# Patient Record
Sex: FEMALE | Race: WHITE | Employment: FULL TIME | URBAN - METROPOLITAN AREA
[De-identification: names, ages, dates, MRNs, and addresses within clinical notes are randomized per-mention and may not be internally consistent; named-entity substitution may affect disease eponyms.]

---

## 2018-08-29 ENCOUNTER — HOSPITAL ENCOUNTER (EMERGENCY)
Age: 28
Discharge: HOME | End: 2018-08-29
Attending: EMERGENCY MEDICINE
Payer: SUBSIDIZED

## 2018-08-29 ENCOUNTER — APPOINTMENT (OUTPATIENT)
Dept: GENERAL RADIOLOGY | Age: 28
End: 2018-08-29
Attending: EMERGENCY MEDICINE
Payer: SUBSIDIZED

## 2018-08-29 VITALS
TEMPERATURE: 97.9 F | WEIGHT: 293 LBS | HEIGHT: 67 IN | SYSTOLIC BLOOD PRESSURE: 142 MMHG | OXYGEN SATURATION: 100 % | DIASTOLIC BLOOD PRESSURE: 69 MMHG | HEART RATE: 80 BPM | RESPIRATION RATE: 18 BRPM | BODY MASS INDEX: 45.99 KG/M2

## 2018-08-29 DIAGNOSIS — M25.572 ACUTE LEFT ANKLE PAIN: Primary | ICD-10-CM

## 2018-08-29 PROCEDURE — 93971 EXTREMITY STUDY: CPT

## 2018-08-29 PROCEDURE — 73610 X-RAY EXAM OF ANKLE: CPT

## 2018-08-29 PROCEDURE — 99282 EMERGENCY DEPT VISIT SF MDM: CPT

## 2018-08-29 RX ORDER — IBUPROFEN 600 MG/1
600 TABLET ORAL
Qty: 20 TAB | Refills: 0 | Status: SHIPPED | OUTPATIENT
Start: 2018-08-29 | End: 2018-08-29

## 2018-08-29 NOTE — ED NOTES
 
 
33year old female with complaints of right foot and ankle pain + numbness, tingling, swelling + OC and smoking.

## 2018-08-29 NOTE — ED NOTES
Patient given discharge instructions and prescriptions, verbalized understanding. Pt crutched out of ER with male  who is driving pt home

## 2018-08-29 NOTE — ED PROVIDER NOTES
Patient is a 33 y.o. female presenting with ankle pain. Ankle Pain Pertinent negatives include no back pain and no neck pain. Pt states that she had an abrupt onset of left ankle pain this morning when she stepped out of bed. She denies any falls or direct injury. Skin integrity is intact with lateral patches of chronic psoriasis. . There is no obvious bony deformity. Good neurovascular sensation. No apparent tendon or nerve injury. Pain increases with weight bearing. She has not had any medications today prior to arrival. 
Old charts reviewed. Past Medical History:  
Diagnosis Date  Asthma  Atrial fibrillation ([address]  Enlarged heart  Heart murmur  Obese  Psychiatric disorder   
 anxiety Past Surgical History:  
Procedure Laterality Date  CARDIAC SURG PROCEDURE UNLIST    
 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    
 bilat. feet  VASCULAR SURGERY PROCEDURE UNLIST Family History:  
Problem Relation Age of Onset  Mental Retardation Mother  Diabetes Maternal Grandmother  Heart Disease Maternal Grandmother  Hypertension Maternal Grandmother  Stroke Maternal Grandmother Social History Social History  Marital status:  Spouse name: N/A  
 Number of children: N/A  
 Years of education: N/A Occupational History  Not on file. Social History Main Topics  Smoking status: Current Every Day Smoker  Smokeless tobacco: Never Used  Alcohol use No  
 Drug use: No  
 Sexual activity: Yes  
  Partners: Male Birth control/ protection: Implant Other Topics Concern  Not on file Social History Narrative ALLERGIES: Ibuprofen Review of Systems Constitutional: Negative for activity change and appetite change. HENT: Negative for facial swelling, sore throat and trouble swallowing. Eyes: Negative. Respiratory: Negative for shortness of breath. Cardiovascular: Negative. Gastrointestinal: Negative for abdominal pain, diarrhea and vomiting. Genitourinary: Negative for dysuria. Musculoskeletal: Positive for gait problem and joint swelling. Negative for back pain and neck pain. Skin: Negative for color change. Neurological: Negative for headaches. Psychiatric/Behavioral: Negative. Vitals:  
 08/29/18 1442 BP: 148/90 Pulse: 90 Resp: 20 Temp: 98.1 °F (36.7 °C) SpO2: 92% Weight: (!) 181.4 kg (400 lb) Height: 5' 7\" (1.702 m) Physical Exam  
Constitutional: She is oriented to person, place, and time. Morbidly obese white female; smoker HENT:  
Head: Normocephalic. Mouth/Throat: Oropharynx is clear and moist.  
Eyes: Pupils are equal, round, and reactive to light. Neck: Normal range of motion. Neck supple. Cardiovascular: Normal rate and regular rhythm. Pulmonary/Chest: Effort normal and breath sounds normal.  
Abdominal: Soft. Bowel sounds are normal.  
Musculoskeletal: Normal range of motion. She exhibits edema and tenderness. She exhibits no deformity. Neurological: She is alert and oriented to person, place, and time. Skin: Skin is warm and dry. Rash noted. Chronic psoriasis rash on both ankles Nursing note and vitals reviewed. MDM 
 
 
ED Course Procedures Pt was instructed in the use of crutches to avoid full weightbearing. Encouraged close follow up with foot specialist if symptoms persist. 
6:30 PM 
Patient's results and plan of care have been reviewed with her. Patient and/or family have verbally conveyed their understanding and agreement of the patient's signs, symptoms, diagnosis, treatment and prognosis and additionally agree to follow up as recommended or return to the Emergency Room should her condition change prior to follow-up.   Discharge instructions have also been provided to the patient with some educational information regarding her diagnosis as well a list of reasons why she would want to return to the ER prior to her follow-up appointment should her condition change. Supa Duenas, NP

## 2018-08-29 NOTE — DISCHARGE INSTRUCTIONS
Foot Pain: Care Instructions  Your Care Instructions  Foot injuries that cause pain and swelling are fairly common. Almost all sports or home repair projects can cause a misstep that ends up as foot pain. Normal wear and tear, especially as you get older, also can cause foot pain. Most minor foot injuries will heal on their own, and home treatment is usually all you need to do. If you have a severe injury, you may need tests and treatment. Follow-up care is a key part of your treatment and safety. Be sure to make and go to all appointments, and call your doctor if you are having problems. It's also a good idea to know your test results and keep a list of the medicines you take. How can you care for yourself at home? · Take pain medicines exactly as directed. ¨ If the doctor gave you a prescription medicine for pain, take it as prescribed. ¨ If you are not taking a prescription pain medicine, ask your doctor if you can take an over-the-counter medicine. · Rest and protect your foot. Take a break from any activity that may cause pain. · Put ice or a cold pack on your foot for 10 to 20 minutes at a time. Put a thin cloth between the ice and your skin. · Prop up the sore foot on a pillow when you ice it or anytime you sit or lie down during the next 3 days. Try to keep it above the level of your heart. This will help reduce swelling. · Your doctor may recommend that you wrap your foot with an elastic bandage. Keep your foot wrapped for as long as your doctor advises. · If your doctor recommends crutches, use them as directed. · Wear roomy footwear. · As soon as pain and swelling end, begin gentle exercises of your foot. Your doctor can tell you which exercises will help. When should you call for help? Call 911 anytime you think you may need emergency care.  For example, call if:    · Your foot turns pale, white, blue, or cold.    Call your doctor now or seek immediate medical care if:    · You cannot move or stand on your foot.     · Your foot looks twisted or out of its normal position.     · Your foot is not stable when you step down.     · You have signs of infection, such as:  ¨ Increased pain, swelling, warmth, or redness. ¨ Red streaks leading from the sore area. ¨ Pus draining from a place on your foot. ¨ A fever.     · Your foot is numb or tingly.    Watch closely for changes in your health, and be sure to contact your doctor if:    · You do not get better as expected.     · You have bruises from an injury that last longer than 2 weeks. Where can you learn more? Go to http://will-rafy.info/. Enter X337 in the search box to learn more about \"Foot Pain: Care Instructions. \"  Current as of: November 29, 2017  Content Version: 11.7  © 0905-6104 [employer]. Care instructions adapted under license by [employer] (which disclaims liability or warranty for this information). If you have questions about a medical condition or this instruction, always ask your healthcare professional. [place] [address] any warranty or liability for your use of this information. Musculoskeletal Pain: Care Instructions  Your Care Instructions    Different problems with the bones, muscles, nerves, ligaments, and tendons in the body can cause pain. One or more areas of your body may ache or burn. Or they may feel tired, stiff, or sore. The medical term for this type of pain is musculoskeletal pain. It can have many different causes. Sometimes the pain is caused by an injury such as a strain or sprain. Or you might have pain from using one part of your body in the same way over and over again. This is called overuse. In some cases, the cause of the pain is another health problem such as arthritis or fibromyalgia. The doctor will examine you and ask you questions about your health to help find the cause of your pain.  Blood tests or imaging tests like an X-ray may also be helpful. But sometimes doctors can't find a cause of the pain. Treatment depends on your symptoms and the cause of the pain, if known. The doctor has checked you carefully, but problems can develop later. If you notice any problems or new symptoms, get medical treatment right away. Follow-up care is a key part of your treatment and safety. Be sure to make and go to all appointments, and call your doctor if you are having problems. It's also a good idea to know your test results and keep a list of the medicines you take. How can you care for yourself at home? · Rest until you feel better. · Do not do anything that makes the pain worse. Return to exercise gradually if you feel better and your doctor says it's okay. · Be safe with medicines. Read and follow all instructions on the label. ¨ If the doctor gave you a prescription medicine for pain, take it as prescribed. ¨ If you are not taking a prescription pain medicine, ask your doctor if you can take an over-the-counter medicine. · Put ice or a cold pack on the area for 10 to 20 minutes at a time to ease pain. Put a thin cloth between the ice and your skin. When should you call for help? Call your doctor now or seek immediate medical care if:    · You have new pain, or your pain gets worse.     · You have new symptoms such as a fever, a rash, or chills.    Watch closely for changes in your health, and be sure to contact your doctor if:    · You do not get better as expected. Where can you learn more? Go to http://will-rafy.info/. Enter Y579 in the search box to learn more about \"Musculoskeletal Pain: Care Instructions. \"  Current as of: October 9, 2017  Content Version: 11.7  © 0098-6363 [email or web address]. Care instructions adapted under license by [employer] (which disclaims liability or warranty for this information).  If you have questions about a medical condition or this instruction, always ask your healthcare professional. [place] [address] any warranty or liability for your use of this information.

## 2018-08-29 NOTE — LETTER
NOTIFICATION RETURN TO WORK / SCHOOL 
 
8/29/2018 6:31 PM 
 
Ms. Anusha Dolan [address] [place] [address] To Whom It May Concern: 
 
Anusha Dolan is currently under the care of [address]. She will return to work/school on: 9/1/18 If there are questions or concerns please have the patient contact our office. Sincerely, Margarita Thomas NP

## 2018-08-29 NOTE — PROCEDURES
[facility]  *** FINAL REPORT ***    Name: Johnson Hernandez  MRN: [id number]  : 1990  HIS Order #: [id number]  [address] [facility] Visit #: [id number]  Date: 29 Aug 2018    TYPE OF TEST: Peripheral Venous Testing    REASON FOR TEST  Limb swelling    Left Leg:-  Deep venous thrombosis:           No  Superficial venous thrombosis:    No  Deep venous insufficiency:        Not examined  Superficial venous insufficiency: Not examined      INTERPRETATION/FINDINGS  PROCEDURE:  Color duplex ultrasound imaging of lower extremity veins. FINDINGS:       Right: The common femoral vein is patent and without evidence of  thrombus. Phasic flow is observed. This extremity was not otherwise  evaluated. Left:   The common femoral, deep femoral, femoral, popliteal,  posterior tibial, peroneal, and great saphenous are patent and without   evidence of thrombus;  each is fully compressible and there is no  narrowing of the flow channel on color Doppler imaging. There is  limited visualization of the peroneal veins. Phasic flow is observed  in the common femoral vein. IMPRESSION:  No evidence of left lower extremity vein thrombosis where   visualized. ADDITIONAL COMMENTS    I have personally reviewed the data relevant to the interpretation of  this  study.     TECHNOLOGIST: Lucina Herrera RVT  Signed: 2018 05:12 PM    PHYSICIAN: Maple Dubin., MD  Signed: 2018 08:10 AM

## 2018-08-29 NOTE — ED TRIAGE NOTES
Pt complains of L foot pain upon getting up off the cough today. Denies any recent injuries or falls. Pain worsens with palpation. Note rash to inner L ankle, pt states the rash is not new and has had it for years. States she feels like the L leg is swollen.

## 2019-07-29 ENCOUNTER — OFFICE VISIT (OUTPATIENT)
Dept: FAMILY MEDICINE CLINIC | Age: 29
End: 2019-07-29

## 2019-07-29 VITALS
WEIGHT: 293 LBS | DIASTOLIC BLOOD PRESSURE: 100 MMHG | RESPIRATION RATE: 24 BRPM | HEART RATE: 92 BPM | BODY MASS INDEX: 45.99 KG/M2 | HEIGHT: 67 IN | SYSTOLIC BLOOD PRESSURE: 142 MMHG | OXYGEN SATURATION: 99 % | TEMPERATURE: 98.2 F

## 2019-07-29 DIAGNOSIS — J45.40 MODERATE PERSISTENT ASTHMA WITHOUT COMPLICATION: Primary | ICD-10-CM

## 2019-07-29 DIAGNOSIS — M25.561 ACUTE PAIN OF BOTH KNEES: ICD-10-CM

## 2019-07-29 DIAGNOSIS — Z13.220 SCREENING, LIPID: ICD-10-CM

## 2019-07-29 DIAGNOSIS — R73.01 IMPAIRED FASTING BLOOD SUGAR: ICD-10-CM

## 2019-07-29 DIAGNOSIS — M25.562 ACUTE PAIN OF BOTH KNEES: ICD-10-CM

## 2019-07-29 DIAGNOSIS — I48.0 INTERMITTENT ATRIAL FIBRILLATION (HCC): ICD-10-CM

## 2019-07-29 DIAGNOSIS — E66.01 OBESITY, MORBID (HCC): ICD-10-CM

## 2019-07-29 DIAGNOSIS — I10 ESSENTIAL HYPERTENSION: ICD-10-CM

## 2019-07-29 RX ORDER — TRAMADOL HYDROCHLORIDE 50 MG/1
50-100 TABLET ORAL
COMMUNITY
Start: 2018-10-09 | End: 2019-07-29

## 2019-07-29 RX ORDER — BUPROPION HYDROCHLORIDE 150 MG/1
150 TABLET, EXTENDED RELEASE ORAL
COMMUNITY
End: 2019-07-29

## 2019-07-29 RX ORDER — ALBUTEROL SULFATE 90 UG/1
1 AEROSOL, METERED RESPIRATORY (INHALATION)
Qty: 1 INHALER | Refills: 6 | Status: SHIPPED | OUTPATIENT
Start: 2019-07-29 | End: 2020-01-28

## 2019-07-29 RX ORDER — ALBUTEROL SULFATE 0.83 MG/ML
SOLUTION RESPIRATORY (INHALATION)
Refills: 1 | COMMUNITY
Start: 2019-07-27 | End: 2019-07-29 | Stop reason: SDUPTHER

## 2019-07-29 RX ORDER — DESOGESTREL AND ETHINYL ESTRADIOL 0.15-0.03
KIT ORAL
Refills: 3 | COMMUNITY
Start: 2019-06-04 | End: 2020-11-13

## 2019-07-29 RX ORDER — CYCLOBENZAPRINE HCL 10 MG
10 TABLET ORAL
COMMUNITY
Start: 2018-10-09 | End: 2019-07-29

## 2019-07-29 RX ORDER — AMLODIPINE BESYLATE 5 MG/1
TABLET ORAL
Refills: 4 | COMMUNITY
Start: 2019-07-02 | End: 2019-07-29 | Stop reason: SDUPTHER

## 2019-07-29 RX ORDER — BUPROPION HYDROCHLORIDE 150 MG/1
TABLET, EXTENDED RELEASE ORAL
Refills: 3 | COMMUNITY
Start: 2019-07-28 | End: 2019-07-29

## 2019-07-29 RX ORDER — ALPRAZOLAM 0.5 MG/1
0.5 TABLET ORAL
COMMUNITY
End: 2019-07-29

## 2019-07-29 RX ORDER — ESCITALOPRAM OXALATE 10 MG/1
TABLET ORAL
Refills: 1 | COMMUNITY
Start: 2019-07-14 | End: 2019-07-29

## 2019-07-29 RX ORDER — DESVENLAFAXINE 100 MG/1
50 TABLET, EXTENDED RELEASE ORAL DAILY
Refills: 10 | COMMUNITY
Start: 2019-07-02 | End: 2020-10-06 | Stop reason: ALTCHOICE

## 2019-07-29 RX ORDER — ALBUTEROL SULFATE 0.83 MG/ML
2.5 SOLUTION RESPIRATORY (INHALATION)
Qty: 30 NEBULE | Refills: 1 | Status: SHIPPED | OUTPATIENT
Start: 2019-07-29 | End: 2019-10-17 | Stop reason: SDUPTHER

## 2019-07-29 RX ORDER — AMOXICILLIN 500 MG/1
CAPSULE ORAL
Refills: 99 | COMMUNITY
Start: 2019-07-27 | End: 2019-07-29 | Stop reason: ALTCHOICE

## 2019-07-29 RX ORDER — METFORMIN HYDROCHLORIDE 500 MG/1
TABLET, EXTENDED RELEASE ORAL
Refills: 1 | COMMUNITY
Start: 2019-07-13 | End: 2019-10-15 | Stop reason: SDUPTHER

## 2019-07-29 RX ORDER — ALPRAZOLAM 0.5 MG/1
TABLET ORAL
Refills: 1 | COMMUNITY
Start: 2019-04-30 | End: 2019-07-29

## 2019-07-29 RX ORDER — AMLODIPINE BESYLATE 5 MG/1
5 TABLET ORAL DAILY
Qty: 90 TAB | Refills: 0 | Status: SHIPPED | OUTPATIENT
Start: 2019-07-29 | End: 2019-08-23 | Stop reason: DRUGHIGH

## 2019-07-29 RX ORDER — CHLORTHALIDONE 25 MG/1
25 TABLET ORAL DAILY
Qty: 30 TAB | Refills: 0 | Status: SHIPPED | OUTPATIENT
Start: 2019-07-29 | End: 2019-08-23 | Stop reason: SDUPTHER

## 2019-07-29 RX ORDER — OMEPRAZOLE 20 MG/1
20 TABLET, DELAYED RELEASE ORAL
COMMUNITY
End: 2020-03-10

## 2019-07-29 NOTE — PATIENT INSTRUCTIONS
Learning About Bariatric Surgery  What is bariatric surgery? Bariatric surgery is surgery to help you lose weight. This type of surgery is only used for people who are very overweight and have not been able to lose weight with diet and exercise. This surgery makes the stomach smaller. Some types of surgery also change the connection between your stomach and intestines. How is bariatric surgery done? Bariatric surgery may be either \"open\" or \"laparoscopic. \" Open surgery is done through a large cut (incision) in the belly. Laparoscopic surgery is done through several small cuts. The doctor puts a lighted tube, or scope, and other surgical tools through small cuts in your belly. The doctor is able to see your organs with the scope. There are different types of bariatric surgery. Gastric sleeve surgery  The surgery is usually done through several small incisions in the belly. The doctor removes more than half of your stomach. This leaves a thin sleeve, or tube, that is about the size of a banana. Because part of your stomach has been removed, this can't be reversed. Gianna-en-Y gastric bypass surgery  Gianna-en-Y (say \"rene-en-why\") surgery changes the connection between the stomach and the intestines. The doctor separates a section of your stomach from the rest of your stomach. This makes a small pouch. The new pouch will hold the food you eat. The doctor connects the stomach pouch to the middle part of the small intestine. Gastric banding surgery  The surgery is usually done through several small incisions in the belly. The doctor wraps a band around the upper part of the stomach. This creates a small pouch. The small size of the pouch means that you will get full after you eat just a small amount of food. The doctor can inflate or deflate the band to adjust the size. This lets the doctor adjust how quickly food passes from the new pouch into the stomach.  It does not change the connection between the stomach and the intestines. What can you expect after the surgery? You may stay in the hospital for one or more days after the surgery. How long you stay depends on the type of surgery you had. Most people need 2 to 4 weeks before they are ready to get back to their usual routine. For the first 2 to 6 weeks after surgery, you probably will need to follow a liquid or soft diet. Bit by bit, you will be able to eat more solid foods. Your doctor may advise you to work with a dietitian. This way you'll be sure to get enough protein, vitamins, and minerals while you are losing weight. Even with a healthy diet, you may need to take vitamin and mineral supplements. After surgery, you will not be able to eat very much at one time. You will get full quickly. Try not to eat too much at one time or eat foods that are high in fat or sugar. If you do, you may vomit, get stomach pain, or have diarrhea. You probably will lose weight very quickly in the first few months after surgery. As time goes on, your weight loss will slow down. You will have regular doctor visits to check how you are doing. Think of bariatric surgery as a tool to help you lose weight. It isn't an instant fix. You will still need to eat a healthy diet and get regular exercise. This will help you reach your weight goal and avoid regaining the weight you lose. Follow-up care is a key part of your treatment and safety. Be sure to make and go to all appointments, and call your doctor if you are having problems. It's also a good idea to know your test results and keep a list of the medicines you take. Where can you learn more? Go to http://will-rafy.info/. Enter G469 in the search box to learn more about \"Learning About Bariatric Surgery. \"  Current as of: March 28, 2019  Content Version: 12.1  © 0282-1541 [employer].  Care instructions adapted under license by [employer] (which disclaims liability or warranty for this information). If you have questions about a medical condition or this instruction, always ask your healthcare professional. [facility] [address] any warranty or liability for your use of this information. DASH Diet: Care Instructions  Your Care Instructions    The DASH diet is an eating plan that can help lower your blood pressure. DASH stands for Dietary Approaches to Stop Hypertension. Hypertension is high blood pressure. The DASH diet focuses on eating foods that are high in calcium, potassium, and magnesium. These nutrients can lower blood pressure. The foods that are highest in these nutrients are fruits, vegetables, low-fat dairy products, nuts, seeds, and legumes. But taking calcium, potassium, and magnesium supplements instead of eating foods that are high in those nutrients does not have the same effect. The DASH diet also includes whole grains, fish, and poultry. The DASH diet is one of several lifestyle changes your doctor may recommend to lower your high blood pressure. Your doctor may also want you to decrease the amount of sodium in your diet. Lowering sodium while following the DASH diet can lower blood pressure even further than just the DASH diet alone. Follow-up care is a key part of your treatment and safety. Be sure to make and go to all appointments, and call your doctor if you are having problems. It's also a good idea to know your test results and keep a list of the medicines you take. How can you care for yourself at home? Following the DASH diet  · Eat 4 to 5 servings of fruit each day. A serving is 1 medium-sized piece of fruit, ½ cup chopped or canned fruit, 1/4 cup dried fruit, or 4 ounces (½ cup) of fruit juice. Choose fruit more often than fruit juice. · Eat 4 to 5 servings of vegetables each day. A serving is 1 cup of lettuce or raw leafy vegetables, ½ cup of chopped or cooked vegetables, or 4 ounces (½ cup) of vegetable juice.  Choose vegetables more often than vegetable juice. · Get 2 to 3 servings of low-fat and fat-free dairy each day. A serving is 8 ounces of milk, 1 cup of yogurt, or 1 ½ ounces of cheese. · Eat 6 to 8 servings of grains each day. A serving is 1 slice of bread, 1 ounce of dry cereal, or ½ cup of cooked rice, pasta, or cooked cereal. Try to choose whole-grain products as much as possible. · Limit lean meat, poultry, and fish to 2 servings each day. A serving is 3 ounces, about the size of a deck of cards. · Eat 4 to 5 servings of nuts, seeds, and legumes (cooked dried beans, lentils, and split peas) each week. A serving is 1/3 cup of nuts, 2 tablespoons of seeds, or ½ cup of cooked beans or peas. · Limit fats and oils to 2 to 3 servings each day. A serving is 1 teaspoon of vegetable oil or 2 tablespoons of salad dressing. · Limit sweets and added sugars to 5 servings or less a week. A serving is 1 tablespoon jelly or jam, ½ cup sorbet, or 1 cup of lemonade. · Eat less than 2,300 milligrams (mg) of sodium a day. If you limit your sodium to 1,500 mg a day, you can lower your blood pressure even more. Tips for success  · Start small. Do not try to make dramatic changes to your diet all at once. You might feel that you are missing out on your favorite foods and then be more likely to not follow the plan. Make small changes, and stick with them. Once those changes become habit, add a few more changes. · Try some of the following:  ? Make it a goal to eat a fruit or vegetable at every meal and at snacks. This will make it easy to get the recommended amount of fruits and vegetables each day. ? Try yogurt topped with fruit and nuts for a snack or healthy dessert. ? Add lettuce, tomato, cucumber, and onion to sandwiches. ? Combine a ready-made pizza crust with low-fat mozzarella cheese and lots of vegetable toppings. Try using tomatoes, squash, spinach, broccoli, carrots, cauliflower, and onions. ?  Have a variety of cut-up vegetables with a low-fat dip as an appetizer instead of chips and dip. ? Sprinkle sunflower seeds or chopped almonds over salads. Or try adding chopped walnuts or almonds to cooked vegetables. ? Try some vegetarian meals using beans and peas. Add garbanzo or kidney beans to salads. Make burritos and tacos with mashed gonzalez beans or black beans. Where can you learn more? Go to http://will-rafy.info/. Enter U927 in the search box to learn more about \"DASH Diet: Care Instructions. \"  Current as of: July 22, 2018  Content Version: 12.1  © 8924-7885 [employer]. Care instructions adapted under license by [employer] (which disclaims liability or warranty for this information). If you have questions about a medical condition or this instruction, always ask your healthcare professional. [address] any warranty or liability for your use of this information. Learning About Low-Carbohydrate Diets for Weight Loss  What is a low-carbohydrate diet? Low-carb diets avoid foods that are high in carbohydrate. These high-carb foods include pasta, bread, rice, cereal, fruits, and starchy vegetables. Instead, these diets usually have you eat foods that are high in fat and protein. Many people lose weight quickly on a low-carb diet. But the early weight loss is water. People on this diet often gain the weight back after they start eating carbs again. Not all diet plans are safe or work well. A lot of the evidence shows that low-carb diets aren't healthy. That's because these diets often don't include healthy foods like fruits and vegetables. Losing weight safely means balancing protein, fat, and carbs with every meal and snack. And low-carb diets don't always provide the vitamins, minerals, and fiber you need. If you have a serious medical condition, talk to your doctor before you try any diet.  These conditions include kidney disease, heart disease, type 2 diabetes, high cholesterol, and high blood pressure. If you are pregnant, it may not be safe for your baby if you are on a low-carb diet. How can you lose weight safely? You might have heard that a diet plan helped another person lose weight. But that doesn't mean that it will work for you. It is very hard to stay on a diet that includes lots of big changes in your eating habits. If you want to get to a healthy weight and stay there, making healthy lifestyle changes will often work better than dieting. These steps can help. · Make a plan for change. Work with your doctor to create a plan that is right for you. · See a dietitian. He or she can show you how to make healthy changes in your eating habits. · Manage stress. If you have a lot of stress in your life, it can be hard to focus on making healthy changes to your daily habits. · Track your food and activity. You are likely to do better at losing weight if you keep track of what you eat and what you do. Follow-up care is a key part of your treatment and safety. Be sure to make and go to all appointments, and call your doctor if you are having problems. It's also a good idea to know your test results and keep a list of the medicines you take. Where can you learn more? Go to http://will-rafy.info/. Enter A121 in the search box to learn more about \"Learning About Low-Carbohydrate Diets for Weight Loss. \"  Current as of: November 7, 2018  Content Version: 12.1  © 2917-8504 Healthwise, Incorporated. Care instructions adapted under license by [employer] (which disclaims liability or warranty for this information). If you have questions about a medical condition or this instruction, always ask your healthcare professional. [address] any warranty or liability for your use of this information. Knee: Exercises  Introduction  Here are some examples of exercises for you to try.  The exercises may be suggested for a condition or for rehabilitation. Start each exercise slowly. Ease off the exercises if you start to have pain. You will be told when to start these exercises and which ones will work best for you. How to do the exercises  Quad sets    1. Sit with your leg straight and supported on the floor or a firm bed. (If you feel discomfort in the front or back of your knee, place a small towel roll under your knee.)  2. Tighten the muscles on top of your thigh by pressing the back of your knee flat down to the floor. (If you feel discomfort under your kneecap, place a small towel roll under your knee.)  3. Hold for about 6 seconds, then rest for up to 10 seconds. 4. Do 8 to 12 repetitions several times a day. Straight-leg raises to the front    1. Lie on your back with your good knee bent so that your foot rests flat on the floor. Your injured leg should be straight. Make sure that your low back has a normal curve. You should be able to slip your flat hand in between the floor and the small of your back, with your palm touching the floor and your back touching the back of your hand. 2. Tighten the thigh muscles in the injured leg by pressing the back of your knee flat down to the floor. Hold your knee straight. 3. Keeping the thigh muscles tight, lift your injured leg up so that your heel is about 12 inches off the floor. Hold for about 6 seconds and then lower slowly. 4. Do 8 to 12 repetitions, 3 times a day. Straight-leg raises to the outside    1. Lie on your side, with your injured leg on top. 2. Tighten the front thigh muscles of your injured leg to keep your knee straight. 3. Keep your hip and your leg straight in line with the rest of your body, and keep your knee pointing forward. Do not drop your hip back. 4. Lift your injured leg straight up toward the ceiling, about 12 inches off the floor. Hold for about 6 seconds, then slowly lower your leg. 5. Do 8 to 12 repetitions.     Straight-leg raises to the back    1. Lie on your stomach, and lift your leg straight up behind you (toward the ceiling). 2. Lift your toes about 6 inches off the floor, hold for about 6 seconds, then lower slowly. 3. Do 8 to 12 repetitions. Straight-leg raises to the inside    1. Lie on the side of your body with the injured leg. 2. You can either prop your other (good) leg up on a chair, or you can bend your good knee and put that foot in front of your injured knee. Do not drop your hip back. 3. Tighten the muscles on the front of your thigh to straighten your injured knee. 4. Keep your kneecap pointing forward, and lift your whole leg up toward the ceiling about 6 inches. Hold for about 6 seconds, then lower slowly. 5. Do 8 to 12 repetitions. Heel dig bridging    1. Lie on your back with both knees bent and your ankles bent so that only your heels are digging into the floor. Your knees should be bent about 90 degrees. 2. Then push your heels into the floor, squeeze your buttocks, and lift your hips off the floor until your shoulders, hips, and knees are all in a straight line. 3. Hold for about 6 seconds as you continue to breathe normally, and then slowly lower your hips back down to the floor and rest for up to 10 seconds. 4. Do 8 to 12 repetitions. Hamstring curls    1. Lie on your stomach with your knees straight. If your kneecap is uncomfortable, roll up a washcloth and put it under your leg just above your kneecap. 2. Lift the foot of your injured leg by bending the knee so that you bring the foot up toward your buttock. If this motion hurts, try it without bending your knee quite as far. This may help you avoid any painful motion. 3. Slowly lower your leg back to the floor. 4. Do 8 to 12 repetitions. 5. With permission from your doctor or physical therapist, you may also want to add a cuff weight to your ankle (not more than 5 pounds).  With weight, you do not have to lift your leg more than 12 inches to get a hamstring workout. Shallow standing knee bends    1. Stand with your hands lightly resting on a counter or chair in front of you. Put your feet shoulder-width apart. 2. Slowly bend your knees so that you squat down like you are going to sit in a chair. Make sure your knees do not go in front of your toes. 3. Lower yourself about 6 inches. Your heels should remain on the floor at all times. 4. Rise slowly to a standing position. Heel raises    1. Stand with your feet a few inches apart, with your hands lightly resting on a counter or chair in front of you. 2. Slowly raise your heels off the floor while keeping your knees straight. 3. Hold for about 6 seconds, then slowly lower your heels to the floor. 4. Do 8 to 12 repetitions several times during the day. Follow-up care is a key part of your treatment and safety. Be sure to make and go to all appointments, and call your doctor if you are having problems. It's also a good idea to know your test results and keep a list of the medicines you take. Where can you learn more? Go to http://will-rafy.info/. Enter H367 in the search box to learn more about \"Knee: Exercises. \"  Current as of: September 20, 2018  Content Version: 12.1  © 7613-1805 Healthwise, Incorporated. Care instructions adapted under license by [employer] (which disclaims liability or warranty for this information). If you have questions about a medical condition or this instruction, always ask your healthcare professional. [address] any warranty or liability for your use of this information.

## 2019-07-29 NOTE — PROGRESS NOTES
[facility] Note  HPI:   Erwin Conroy is a 30 y.o. female who presents to establish care. Previous provider: Dr. oMntejo July, last appointment in winter 2018. Chief Complaint   Patient presents with    New Patient    Establish Care    Physical     pt is fasting     Cardiovascular Review:  The patient has hypertension, Atrial Fibrillation and obesity. History of aortic valve regurgitation, intermittent a.fib. Cardiologist is Dr. Huang Lopez. Last OV was 1 month ago. Follow-up in 1 year with echo. Diet and Lifestyle: sedentary, nonsmoker  Home BP Monitoring: is borderline controlled at home, ranging 120's/80-90's. Pertinent ROS: taking medications as instructed, no medication side effects noted, no TIA's, no chest pain on exertion, no dyspnea on exertion, no swelling of ankles. History of anxiety and depression. Feels like symptoms are well controlled. Denies depressed mood. On Celexa 20, desvenlafaxine 100 mg daily and has been stable on regimen. No adverse effects. History of admission for attempted self-harm. No mental health provider in a few years. History of asthma. Triggers: pollen, dust, exericise, warm weather. Albuterol inhaler maybe twice weekkl. Not currently on breo or advair. Needs refills. Last pulmonary functoin testing several years ago. Last bronchitis episdoes was in the winter. OBGYN: Dr. Live Bartlett. UTD on Pap smear screenings. History of PCOS, OBGYN has been prescibing metfomin  mg   Also on MEGAN. Reports pneumococcal vaccines at [employer]. Bilerateral knee pain   Worse with walking. Tylenol and advil PM to take edge off  Sometime knee feels like it will giving out     Morbidly obese. She reports today is heaviest lifetime weight. Tried ketogenic diet. Was sufcessful, lost 20 pounds. Also tried weight watchers which was not as helpful. Lowest adult weight was 250lbs as a teenager.      Diet Recall:  Breakfast yesterday: None, tends to sleep in. Lunch: taco dip with rahel chips, unsweet tea  Dinner: Chicken Parmesan, 1 breast, with spaghetti, unsweet tea. Other drinks: water. Maybe 1 can or soft drink soda per day. Current Outpatient Medications   Medication Sig Dispense Refill    omeprazole (PRILOSEC OTC) 20 mg tablet Take 20 mg by mouth.  metFORMIN ER (GLUCOPHAGE XR) 500 mg tablet TAKE 1 TABLET BY MOUTH EVERY DAY IN THE EVENING WITH DINNER  1    Desvenlafaxine 100 mg Tb24 TAKE 1 TABLET BY MOUTH EVERY DAY  10    ENSKYCE 0.15-0.03 mg tab TAKE 1 TABLET BY MOUTH ONCE DAILY  3    albuterol (PROVENTIL HFA, VENTOLIN HFA, PROAIR HFA) 90 mcg/actuation inhaler Take 1 Puff by inhalation every six (6) hours as needed for Wheezing. 1 Inhaler 6    albuterol (PROVENTIL VENTOLIN) 2.5 mg /3 mL (0.083 %) nebu Take 3 mL by inhalation every four (4) hours as needed for Cough or Other (SOB). 30 Nebule 1    amLODIPine (NORVASC) 5 mg tablet Take 1 Tab by mouth daily. 90 Tab 0    chlorthalidone (HYGROTEN) 25 mg tablet Take 1 Tab by mouth daily. 30 Tab 0    citalopram (CELEXA) 20 mg tablet Take 1 Tab by mouth daily. 30 Tab 0      Allergies   Allergen Reactions    Ibuprofen Nausea and Vomiting      Patient Active Problem List   Diagnosis Code    Anxiety F41.9    Depression F32.9    Obesity, morbid ([address] E66.01     Past Medical History:   Diagnosis Date    Anxiety and depression     anxiety    Asthma     Atrial fibrillation ([address]     Enlarged heart     Heart murmur     Hypertension     Obese       Past Surgical History:   Procedure Laterality Date    CARDIAC SURG PROCEDURE UNLIST      2 heart valves \"sewn up\" as an infant    HX ORTHOPAEDIC      bilat. feet    VASCULAR SURGERY PROCEDURE UNLIST        No LMP recorded. (Menstrual status: Medically Induced).    Family History   Problem Relation Age of Onset    Mental Retardation Mother     Diabetes Maternal Grandmother     Heart Disease Maternal Grandmother     Hypertension Maternal Grandmother     Stroke Maternal Grandmother       Social History     Socioeconomic History    Marital status:      Spouse name: Not on file    Number of children: Not on file    Years of education: Not on file    Highest education level: Not on file   Occupational History    Not on file   Social Needs    Financial resource strain: Not on file    Food insecurity:     Worry: Not on file     Inability: Not on file    Transportation needs:     Medical: Not on file     Non-medical: Not on file   Tobacco Use    Smoking status: Former Smoker    Smokeless tobacco: Never Used   Substance and Sexual Activity    Alcohol use: No    Drug use: No    Sexual activity: Yes     Partners: Male     Birth control/protection: Pill   Lifestyle    Physical activity:     Days per week: Not on file     Minutes per session: Not on file    Stress: Not on file   Relationships    Social connections:     Talks on phone: Not on file     Gets together: Not on file     Attends [employer] service: Not on file     Active member of club or organization: Not on file     Attends meetings of clubs or organizations: Not on file     Relationship status: Not on file    Intimate partner violence:     Fear of current or ex partner: Not on file     Emotionally abused: Not on file     Physically abused: Not on file     Forced sexual activity: Not on file   Other Topics Concern    Not on file   Social History Narrative    Not on file        ROS:   Review of Systems   Constitutional: Negative for chills, diaphoresis, fever, malaise/fatigue and weight loss. HENT: Negative for congestion, ear pain, hearing loss, sinus pain, sore throat and tinnitus. Eyes: Negative for blurred vision and double vision. Respiratory: Negative for shortness of breath. Cardiovascular: Negative for chest pain, palpitations and leg swelling.    Gastrointestinal: Negative for abdominal pain, blood in stool, constipation, diarrhea, heartburn, melena, nausea and vomiting. Genitourinary: Negative for dysuria, frequency, hematuria and urgency. Musculoskeletal: Positive for joint pain. Negative for myalgias. Skin: Negative for itching and rash. Neurological: Negative for dizziness, tingling, weakness and headaches. Endo/Heme/Allergies: Negative for polydipsia. Psychiatric/Behavioral: Negative. Negative for depression, hallucinations, memory loss, substance abuse and suicidal ideas. The patient is not nervous/anxious and does not have insomnia. Physical Exam:     Visit Vitals  BP (!) 142/100   Pulse 92   Temp 98.2 °F (36.8 °C) (Oral)   Resp 24   Ht 5' 7\" (1.702 m)   Wt (!) 443 lb (200.9 kg)   SpO2 99%   BMI 69.38 kg/m²        Vitals and Nurse Documentation reviewed. Physical Exam   Constitutional: She is oriented to person, place, and time and well-developed, well-nourished, and in no distress. Morbidly obese female. HENT:   Head: Normocephalic and atraumatic. Neck: Carotid bruit is not present. Cardiovascular: Normal rate, regular rhythm, normal heart sounds and intact distal pulses. Exam reveals no gallop and no friction rub. No murmur heard. Pulmonary/Chest: Effort normal and breath sounds normal. No respiratory distress. She has no wheezes. She has no rales. She exhibits no tenderness. Abdominal: Soft. Bowel sounds are normal. She exhibits no distension and no mass. There is no tenderness. There is no rebound and no guarding. Musculoskeletal: She exhibits edema. Right knee: She exhibits normal range of motion, no swelling, no effusion, no ecchymosis, no deformity, no laceration and no erythema. No tenderness found. Left knee: She exhibits normal range of motion, no swelling, no effusion, no ecchymosis, no deformity, no laceration and no erythema. No tenderness found.    +1 non pitting edema of bilateral ankles    Neurological: She is alert and oriented to person, place, and time. Gait normal.   Skin: Skin is warm and dry. Psychiatric: Mood, memory, affect and judgment normal.   Nursing note and vitals reviewed. Assessment/ Plan:   [employer] were discussed including hours of operation, on-call providers, and MyChart. Diagnoses and all orders for this visit:    1. Moderate persistent asthma without complication: Stable continue PRN albuterol. Consider ICS if albuterol need increases. -     albuterol (PROVENTIL HFA, VENTOLIN HFA, PROAIR HFA) 90 mcg/actuation inhaler; Take 1 Puff by inhalation every six (6) hours as needed for Wheezing.  -     albuterol (PROVENTIL VENTOLIN) 2.5 mg /3 mL (0.083 %) nebu; Take 3 mL by inhalation every four (4) hours as needed for Cough or Other (SOB). 2. Obesity, morbid ([address]): I have reviewed/discussed the above normal BMI with the patient. I have recommended the following interventions: dietary management education, guidance, and counseling, encourage exercise, monitor weight and prescribed dietary intake. Given written instructions as well. -     REFERRAL TO BARIATRIC SURGERY    3. Essential hypertension: Not at goal today, 142/100. Continue Norvasc 5 mg daily, start chlorthalidone 25 mg daily - risks and benefits reviewed. 4 week follow-up. -     amLODIPine (NORVASC) 5 mg tablet; Take 1 Tab by mouth daily. -     chlorthalidone (HYGROTEN) 25 mg tablet; Take 1 Tab by mouth daily.  -     METABOLIC PANEL, COMPREHENSIVE  -     CBC WITH AUTOMATED DIFF    4. Impaired fasting blood sugar  -     HEMOGLOBIN A1C WITH EAG    5. Screening, lipid:   -     LIPID PANEL     6. Acute pain of both knees: Subacute issue. No joint instability noted on exam. She declines PT. Start home exercises. Advised weight loss as above. Consider imaging if symptoms persist.     7. Intermittent atrial fibrillation [facility]): Regular rate and rhythm today. Managed by cardiology.      Other orders  -     CVD REPORT      Follow-up and Dispositions    · Return in about 1 month (around 8/26/2019) for Follow-up.           Discussed expected course/resolution/complications of diagnosis in detail with patient.    Medication risks/benefits/costs/interactions/alternatives discussed with patient.    Pt was given an after visit summary which includes diagnoses, current medications & vitals.  Pt expressed understanding with the diagnosis and plan

## 2019-07-29 NOTE — PROGRESS NOTES
Chief Complaint   Patient presents with    New Patient    Establish Care    Physical     pt is fasting     \"REVIEWED RECORD IN PREPARATION FOR VISIT AND HAVE OBTAINED THE NECESSARY DOCUMENTATION\"  1. Have you been to the ER, urgent care clinic since your last visit? Hospitalized since your last visit? No  Pt is new to this practice today. 2. Have you seen or consulted any other health care providers outside of the [address] [place] [address] since your last visit? Include any pap smears or colon screening.  Yes Where: seen by Cardiology at [facility]

## 2019-07-30 LAB
ALBUMIN SERPL-MCNC: 4.1 G/DL (ref 3.5–5.5)
ALBUMIN/GLOB SERPL: 1.1 {RATIO} (ref 1.2–2.2)
ALP SERPL-CCNC: 87 IU/L (ref 39–117)
ALT SERPL-CCNC: 28 IU/L (ref 0–32)
AST SERPL-CCNC: 26 IU/L (ref 0–40)
BASOPHILS # BLD AUTO: 0 X10E3/UL (ref 0–0.2)
BASOPHILS NFR BLD AUTO: 0 %
BILIRUB SERPL-MCNC: 0.3 MG/DL (ref 0–1.2)
BUN SERPL-MCNC: 13 MG/DL (ref 6–20)
BUN/CREAT SERPL: 19 (ref 9–23)
CALCIUM SERPL-MCNC: 9.3 MG/DL (ref 8.7–10.2)
CHLORIDE SERPL-SCNC: 103 MMOL/L (ref 96–106)
CHOLEST SERPL-MCNC: 167 MG/DL (ref 100–199)
CO2 SERPL-SCNC: 21 MMOL/L (ref 20–29)
CREAT SERPL-MCNC: 0.67 MG/DL (ref 0.57–1)
EOSINOPHIL # BLD AUTO: 0.2 X10E3/UL (ref 0–0.4)
EOSINOPHIL NFR BLD AUTO: 2 %
ERYTHROCYTE [DISTWIDTH] IN BLOOD BY AUTOMATED COUNT: 13.3 % (ref 12.3–15.4)
EST. AVERAGE GLUCOSE BLD GHB EST-MCNC: 123 MG/DL
GLOBULIN SER CALC-MCNC: 3.6 G/DL (ref 1.5–4.5)
GLUCOSE SERPL-MCNC: 70 MG/DL (ref 65–99)
HBA1C MFR BLD: 5.9 % (ref 4.8–5.6)
HCT VFR BLD AUTO: 42.3 % (ref 34–46.6)
HDLC SERPL-MCNC: 71 MG/DL
HGB BLD-MCNC: 13.8 G/DL (ref 11.1–15.9)
IMM GRANULOCYTES # BLD AUTO: 0 X10E3/UL (ref 0–0.1)
IMM GRANULOCYTES NFR BLD AUTO: 0 %
INTERPRETATION, 910389: NORMAL
LDLC SERPL CALC-MCNC: 74 MG/DL (ref 0–99)
LYMPHOCYTES # BLD AUTO: 1.5 X10E3/UL (ref 0.7–3.1)
LYMPHOCYTES NFR BLD AUTO: 14 %
MCH RBC QN AUTO: 30.2 PG (ref 26.6–33)
MCHC RBC AUTO-ENTMCNC: 32.6 G/DL (ref 31.5–35.7)
MCV RBC AUTO: 93 FL (ref 79–97)
MONOCYTES # BLD AUTO: 0.7 X10E3/UL (ref 0.1–0.9)
MONOCYTES NFR BLD AUTO: 7 %
NEUTROPHILS # BLD AUTO: 8.2 X10E3/UL (ref 1.4–7)
NEUTROPHILS NFR BLD AUTO: 77 %
PLATELET # BLD AUTO: 327 X10E3/UL (ref 150–450)
POTASSIUM SERPL-SCNC: 4 MMOL/L (ref 3.5–5.2)
PROT SERPL-MCNC: 7.7 G/DL (ref 6–8.5)
RBC # BLD AUTO: 4.57 X10E6/UL (ref 3.77–5.28)
SODIUM SERPL-SCNC: 140 MMOL/L (ref 134–144)
TRIGL SERPL-MCNC: 108 MG/DL (ref 0–149)
VLDLC SERPL CALC-MCNC: 22 MG/DL (ref 5–40)
WBC # BLD AUTO: 10.7 X10E3/UL (ref 3.4–10.8)

## 2019-08-01 NOTE — PROGRESS NOTES
[employer] message sent:  Cheli Portillo,     Your A1c, average blood sugar over the past 3 months (screening for diabetes), was elevated in the pre-diabetes range. This means that you are at risk for developing diabetes. I recommend that we continue your metformin which will help decrease your insulin resistance. Weight loss is important to help reverse prediabetes. Please look into taking the measures for weight loss we discussed at your visit. The rest of your labs look good and were essentially unremarkable. Please let me know if you have any additional questions. It was great meeting you and I will see you at your next visit!      Armaan Rodriguez NP

## 2019-08-07 DIAGNOSIS — Z01.419 WELL WOMAN EXAM: ICD-10-CM

## 2019-08-13 RX ORDER — CITALOPRAM 20 MG/1
20 TABLET, FILM COATED ORAL DAILY
Qty: 30 TAB | Refills: 5 | Status: SHIPPED | OUTPATIENT
Start: 2019-08-13 | End: 2019-08-23

## 2019-08-14 ENCOUNTER — HOSPITAL ENCOUNTER (EMERGENCY)
Age: 29
Discharge: HOME | End: 2019-08-14
Attending: EMERGENCY MEDICINE
Payer: COMMERCIAL

## 2019-08-14 ENCOUNTER — APPOINTMENT (OUTPATIENT)
Dept: GENERAL RADIOLOGY | Age: 29
End: 2019-08-14
Attending: EMERGENCY MEDICINE
Payer: COMMERCIAL

## 2019-08-14 ENCOUNTER — TELEPHONE (OUTPATIENT)
Dept: FAMILY MEDICINE CLINIC | Age: 29
End: 2019-08-14

## 2019-08-14 VITALS
HEART RATE: 99 BPM | DIASTOLIC BLOOD PRESSURE: 98 MMHG | TEMPERATURE: 98.3 F | OXYGEN SATURATION: 100 % | RESPIRATION RATE: 20 BRPM | SYSTOLIC BLOOD PRESSURE: 166 MMHG

## 2019-08-14 DIAGNOSIS — R06.00 DYSPNEA, UNSPECIFIED TYPE: ICD-10-CM

## 2019-08-14 DIAGNOSIS — R00.2 PALPITATIONS: Primary | ICD-10-CM

## 2019-08-14 LAB
ALBUMIN SERPL-MCNC: 3 G/DL (ref 3.5–5)
ALBUMIN/GLOB SERPL: 0.7 {RATIO} (ref 1.1–2.2)
ALP SERPL-CCNC: 91 U/L (ref 45–117)
ALT SERPL-CCNC: 51 U/L (ref 12–78)
ANION GAP SERPL CALC-SCNC: 9 MMOL/L (ref 5–15)
AST SERPL-CCNC: 21 U/L (ref 15–37)
BILIRUB SERPL-MCNC: 0.2 MG/DL (ref 0.2–1)
BUN SERPL-MCNC: 12 MG/DL (ref 6–20)
BUN/CREAT SERPL: 19 (ref 12–20)
CALCIUM SERPL-MCNC: 9.2 MG/DL (ref 8.5–10.1)
CHLORIDE SERPL-SCNC: 103 MMOL/L (ref 97–108)
CO2 SERPL-SCNC: 23 MMOL/L (ref 21–32)
CREAT SERPL-MCNC: 0.62 MG/DL (ref 0.55–1.02)
D DIMER PPP FEU-MCNC: 0.43 MG/L FEU (ref 0–0.65)
GLOBULIN SER CALC-MCNC: 4.5 G/DL (ref 2–4)
GLUCOSE SERPL-MCNC: 101 MG/DL (ref 65–100)
LIPASE SERPL-CCNC: 62 U/L (ref 73–393)
POTASSIUM SERPL-SCNC: 3.4 MMOL/L (ref 3.5–5.1)
PROT SERPL-MCNC: 7.5 G/DL (ref 6.4–8.2)
SODIUM SERPL-SCNC: 135 MMOL/L (ref 136–145)
TROPONIN I SERPL-MCNC: <0.05 NG/ML

## 2019-08-14 PROCEDURE — 83690 ASSAY OF LIPASE: CPT

## 2019-08-14 PROCEDURE — 80053 COMPREHEN METABOLIC PANEL: CPT

## 2019-08-14 PROCEDURE — 99284 EMERGENCY DEPT VISIT MOD MDM: CPT

## 2019-08-14 PROCEDURE — 93005 ELECTROCARDIOGRAM TRACING: CPT

## 2019-08-14 PROCEDURE — 84484 ASSAY OF TROPONIN QUANT: CPT

## 2019-08-14 PROCEDURE — 85379 FIBRIN DEGRADATION QUANT: CPT

## 2019-08-14 PROCEDURE — 71046 X-RAY EXAM CHEST 2 VIEWS: CPT

## 2019-08-14 PROCEDURE — 36415 COLL VENOUS BLD VENIPUNCTURE: CPT

## 2019-08-14 NOTE — TELEPHONE ENCOUNTER
PC c/o feeling her heart really beating the pulse is running between . Her normal range is < 80. She denies CP but says that it feels like an \"overworked muscle\"  She has a + hx of A fib. Has dizziness and SOB but she has a hx of SOB b/c of asthma and doesn't know when r/t to heart. This has been going on for 3 day now and seems to be getting worse. I recommended that she f/u with cardiologist and she states that her insurance only lets her go a couple of times a year. \"her cardiologist knows about her insurance and recommended that she call PCP\"   Told her Thiago Fellers not her I will have to check with another provider but sure they will recommend she see cardiologist or go to ER. I checked with Dr Christiana Ahuja and she said that pt needs to go to ER for eval if she can't go to cardiologist.  PI and states understanding.

## 2019-08-14 NOTE — ED TRIAGE NOTES
Pt c/o chronic SOB and palpitations. Pt tachypneic while standing at desk. When asked when the SOB started pt replied \"my whole life\". Pt hx of asthma. Denies CP.

## 2019-08-14 NOTE — ED PROVIDER NOTES
HPI patient is a 61-year-old morbidly obese white female who reports abrupt onset of palpitations and shortness of breath while at work today. She contacted her PCP and was referred to the emergency room. She states that this is happened before and they have not found a reason for the palpitations. Denies fever, cough, cold symptoms,headache, neck pain, visual changes, focal weakness or rash. Denies any  difficulty breathing, difficulty swallowing or chest pain. Denies any nausea, vomiting or diarrhea. Patient reports that she has not had any pain medications prior to arrival.  She has an albuterol inhaler but has not used her inhaler today also. Old charts reviewed. Past Medical History:   Diagnosis Date    Anxiety and depression     anxiety    Asthma     Atrial fibrillation (HCC)     Enlarged heart     Heart murmur     Hypertension     Obese        Past Surgical History:   Procedure Laterality Date    CARDIAC SURG PROCEDURE UNLIST      2 heart valves \"sewn up\" as an infant    HX ORTHOPAEDIC      bilat.  feet    VASCULAR SURGERY PROCEDURE UNLIST           Family History:   Problem Relation Age of Onset    Mental Retardation Mother     Diabetes Maternal Grandmother     Heart Disease Maternal Grandmother     Hypertension Maternal Grandmother     Stroke Maternal Grandmother        Social History     Socioeconomic History    Marital status:      Spouse name: Not on file    Number of children: Not on file    Years of education: Not on file    Highest education level: Not on file   Occupational History    Not on file   Social Needs    Financial resource strain: Not on file    Food insecurity:     Worry: Not on file     Inability: Not on file    Transportation needs:     Medical: Not on file     Non-medical: Not on file   Tobacco Use    Smoking status: Former Smoker    Smokeless tobacco: Never Used   Substance and Sexual Activity    Alcohol use: No    Drug use: No    Sexual activity: Yes     Partners: Male     Birth control/protection: Pill   Lifestyle    Physical activity:     Days per week: Not on file     Minutes per session: Not on file    Stress: Not on file   Relationships    Social connections:     Talks on phone: Not on file     Gets together: Not on file     Attends Oriental orthodox service: Not on file     Active member of club or organization: Not on file     Attends meetings of clubs or organizations: Not on file     Relationship status: Not on file    Intimate partner violence:     Fear of current or ex partner: Not on file     Emotionally abused: Not on file     Physically abused: Not on file     Forced sexual activity: Not on file   Other Topics Concern    Not on file   Social History Narrative    Not on file         ALLERGIES: Ibuprofen    Review of Systems   Constitutional: Negative for activity change, appetite change, fever and unexpected weight change. HENT: Negative for congestion and trouble swallowing. Eyes: Negative for visual disturbance. Respiratory: Positive for shortness of breath. Negative for cough. Cardiovascular: Positive for palpitations. Negative for chest pain and leg swelling. Gastrointestinal: Negative for abdominal pain, nausea and vomiting. Genitourinary: Negative for difficulty urinating. Musculoskeletal: Negative for arthralgias and myalgias. Skin: Negative for rash. All other systems reviewed and are negative. Vitals:    08/14/19 1634 08/14/19 1715   BP:  (!) 152/97   Pulse: (!) 127 97   Resp:  20   Temp:  98.1 °F (36.7 °C)   SpO2: 100% 98%            Physical Exam   Constitutional: She is oriented to person, place, and time. She appears well-developed. Morbidly obese white female, , former smoker, birth control pills. HENT:   Mouth/Throat: Oropharynx is clear and moist.   Neck: Normal range of motion. Neck supple. Cardiovascular: Normal rate and regular rhythm.    Pulmonary/Chest: Effort normal and breath sounds normal.   Abdominal: Soft. Bowel sounds are normal.   Musculoskeletal: Normal range of motion. Neurological: She is alert and oriented to person, place, and time. Skin: Skin is warm and dry. No rash noted. Psychiatric: She has a normal mood and affect. Her behavior is normal.   Nursing note and vitals reviewed. MDM       Procedures      EKG reveals normal sinus rhythm with a ventricular rate of 93; without ectopy. Reviewed by Dr. Elizabeth Rm. Discussed plan of care with Carri Luther PA-C; she will assume care.  8:13 PM  Lisa Joe NP

## 2019-08-14 NOTE — ED NOTES
Sarthak Rosa RN at bedside for third US PIV attempt. Attempt #5 for PIV. Provider aware. 2058 Per Debbie Cancino RN, pt states refusal of further venipuncture. Provider made aware by Ignacia Loera.

## 2019-08-15 LAB
ATRIAL RATE: 94 BPM
CALCULATED P AXIS, ECG09: 8 DEGREES
CALCULATED R AXIS, ECG10: 19 DEGREES
CALCULATED T AXIS, ECG11: 37 DEGREES
DIAGNOSIS, 93000: NORMAL
P-R INTERVAL, ECG05: 180 MS
Q-T INTERVAL, ECG07: 392 MS
QRS DURATION, ECG06: 104 MS
QTC CALCULATION (BEZET), ECG08: 490 MS
VENTRICULAR RATE, ECG03: 94 BPM

## 2019-08-15 NOTE — ED NOTES
29 yo F signed out for palpitations/SOB; pt refusing labs that were hemolyzed; Ddimer and Trop neg; will have close followup with PCP/Cards.  BRENDA Santana

## 2019-08-15 NOTE — ED NOTES
Discharge instructions given to patient by PA. Pt has been given counseling and verbalizes understanding. Pt to follow up with PCP and cardiology. Pt ambulated off of unit in no signs of distress.

## 2019-08-15 NOTE — DISCHARGE INSTRUCTIONS

## 2019-08-23 ENCOUNTER — OFFICE VISIT (OUTPATIENT)
Dept: FAMILY MEDICINE CLINIC | Age: 29
End: 2019-08-23

## 2019-08-23 VITALS
TEMPERATURE: 98.4 F | BODY MASS INDEX: 45.99 KG/M2 | OXYGEN SATURATION: 98 % | SYSTOLIC BLOOD PRESSURE: 143 MMHG | WEIGHT: 293 LBS | RESPIRATION RATE: 20 BRPM | HEIGHT: 67 IN | HEART RATE: 84 BPM | DIASTOLIC BLOOD PRESSURE: 86 MMHG

## 2019-08-23 DIAGNOSIS — Z23 ENCOUNTER FOR IMMUNIZATION: ICD-10-CM

## 2019-08-23 DIAGNOSIS — E66.01 OBESITY, MORBID (HCC): ICD-10-CM

## 2019-08-23 DIAGNOSIS — Z01.84 IMMUNITY STATUS TESTING: ICD-10-CM

## 2019-08-23 DIAGNOSIS — I10 ESSENTIAL HYPERTENSION: Primary | ICD-10-CM

## 2019-08-23 DIAGNOSIS — R00.2 PALPITATIONS: ICD-10-CM

## 2019-08-23 RX ORDER — AMLODIPINE BESYLATE 10 MG/1
10 TABLET ORAL DAILY
Qty: 30 TAB | Refills: 1 | Status: SHIPPED | OUTPATIENT
Start: 2019-08-23 | End: 2019-09-17 | Stop reason: SDUPTHER

## 2019-08-23 RX ORDER — ESCITALOPRAM OXALATE 10 MG/1
TABLET ORAL
Refills: 1 | COMMUNITY
Start: 2019-08-15 | End: 2019-10-15 | Stop reason: SDUPTHER

## 2019-08-23 RX ORDER — CHLORTHALIDONE 25 MG/1
25 TABLET ORAL DAILY
Qty: 90 TAB | Refills: 0 | Status: SHIPPED | OUTPATIENT
Start: 2019-08-23 | End: 2019-11-01 | Stop reason: DRUGHIGH

## 2019-08-23 RX ORDER — FLUTICASONE PROPIONATE AND SALMETEROL 50; 250 UG/1; UG/1
POWDER RESPIRATORY (INHALATION)
Refills: 0 | COMMUNITY
Start: 2019-07-29 | End: 2020-01-25

## 2019-08-23 NOTE — PROGRESS NOTES
[facility] Note  Subjective:      Jacquelin Ledezma is a 30 y.o. female who presents for hypertension follow-up. Chief Complaint   Patient presents with    Follow-up     Cardiovascular Review:  The patient has hypertension, Atrial Fibrillation and obesity. History of aortic valve regurgitation, intermittent a.fib. Cardiologist is Dr. Dariana Mueller. Last OV was 1 month ago. Follow-up in 1 year with echo. Diet and Lifestyle: sedentary, nonsmoker  Medications: Norvasc 5 mg, chlorthalidone 25 mg added 4 weeks ago. Home BP Monitorin's/90's  She has lost 7 pounds. Cut out higher calorie beverages. Pertinent ROS: taking medications as instructed, no medication side effects noted, no TIA's, no chest pain on exertion, no dyspnea on exertion, no swelling of ankles. Other interim history: Evaluated at [facility] ER for SOB and palpitations. D-dimer and Trop neg, EKG NSR. Chest x-ray revealed enlarged cardiac silhouette otherwise unremarkable/ discharged. Symptoms have resolved. She is going to follow-up with cardiology. Pap smear with [facility] [place]. Current Outpatient Medications   Medication Sig Dispense Refill    escitalopram oxalate (LEXAPRO) 10 mg tablet TAKE 1 TABLET BY MOUTH EVERY DAY  1    ADVAIR DISKUS 250-50 mcg/dose diskus inhaler INHALE 1 PUFF BY MOUTH TWICE A DAY  0    amLODIPine (NORVASC) 10 mg tablet Take 1 Tab by mouth daily. Indications: high blood pressure 30 Tab 1    chlorthalidone (HYGROTEN) 25 mg tablet Take 1 Tab by mouth daily. 90 Tab 0    omeprazole (PRILOSEC OTC) 20 mg tablet Take 20 mg by mouth.       metFORMIN ER (GLUCOPHAGE XR) 500 mg tablet TAKE 1 TABLET BY MOUTH EVERY DAY IN THE EVENING WITH DINNER  1    Desvenlafaxine 100 mg Tb24 TAKE 1 TABLET BY MOUTH EVERY DAY  10    ENSKYCE 0.15-0.03 mg tab TAKE 1 TABLET BY MOUTH ONCE DAILY  3    albuterol (PROVENTIL HFA, VENTOLIN HFA, PROAIR HFA) 90 mcg/actuation inhaler Take 1 Puff by inhalation every six (6) hours as needed for Wheezing. 1 Inhaler 6    albuterol (PROVENTIL VENTOLIN) 2.5 mg /3 mL (0.083 %) nebu Take 3 mL by inhalation every four (4) hours as needed for Cough or Other (SOB). 30 Nebule 1     Allergies   Allergen Reactions    Ibuprofen Nausea and Vomiting       ROS:   Complete review of systems was reviewed with pertinent information listed in HPI. Review of Systems   Constitutional: Negative for chills, diaphoresis, fever, malaise/fatigue and weight loss. HENT: Negative for congestion, ear pain, hearing loss, sinus pain, sore throat and tinnitus. Eyes: Negative for blurred vision and double vision. Respiratory: Negative for shortness of breath. Cardiovascular: Positive for palpitations. Negative for chest pain and leg swelling. Gastrointestinal: Negative for abdominal pain, blood in stool, constipation, diarrhea, heartburn, melena, nausea and vomiting. Genitourinary: Negative for dysuria, frequency, hematuria and urgency. Musculoskeletal: Negative for joint pain and myalgias. Skin: Negative for itching and rash. Neurological: Negative for dizziness, tingling, weakness and headaches. Endo/Heme/Allergies: Negative for polydipsia. Psychiatric/Behavioral: Negative. Objective:     Visit Vitals  /86   Pulse 84   Temp 98.4 °F (36.9 °C) (Oral)   Resp 20   Ht 5' 7\" (1.702 m)   Wt (!) 436 lb (197.8 kg)   SpO2 98%   BMI 68.29 kg/m²       Vitals and Nurse Documentation reviewed. Physical Exam   Constitutional: She is oriented to person, place, and time and well-developed, well-nourished, and in no distress. morbidly obese female    HENT:   Head: Normocephalic and atraumatic. Neck: Carotid bruit is not present. Cardiovascular: Normal rate, regular rhythm, normal heart sounds and intact distal pulses. Exam reveals no gallop and no friction rub. No murmur heard. Pulmonary/Chest: Effort normal and breath sounds normal. No respiratory distress. She has no wheezes. She has no rales. She exhibits no tenderness. Musculoskeletal: She exhibits no edema. Neurological: She is alert and oriented to person, place, and time. Gait normal.   Skin: Skin is warm and dry. Psychiatric: Mood, memory, affect and judgment normal.   Nursing note and vitals reviewed. Assessment/Plan:     Diagnoses and all orders for this visit:    1. Essential hypertension: Improving, not at goal today, <140/90. Continue chlorthalidone, increase Norvasc from 5 to 10 mg daily. 8 week follow-up. Dash diet reviewed. -     METABOLIC PANEL, BASIC  -     amLODIPine (NORVASC) 10 mg tablet; Take 1 Tab by mouth daily. Indications: high blood pressure  -     chlorthalidone (HYGROTEN) 25 mg tablet; Take 1 Tab by mouth daily. 2. Encounter for immunization: Risks and benefits of immunization reviewed, VIS provided. -     INFLUENZA VIRUS VAC QUAD,SPLIT,PRESV FREE SYRINGE IM  -     TETANUS AND DIPHTHERIA TOXOIDS (TD) ADSORBED, PRES. FREE, IN INDIVIDS. >=7, IM  -     [place] IMMUNIZ ADMIN,1 SINGLE/COMB VAC/TOXOID    3. Obesity, morbid (HCC)L I have reviewed/discussed the above normal BMI with the patient. I have recommended the following interventions: dietary management education, guidance, and counseling, encourage exercise, monitor weight and prescribed dietary intake. Given written instructions as well. - She is considering bariatric surgery - referral was provided. 4. Palpitations: Not present currently. EKG NSR at ER. Advised follow-up with cardiology. TSH today.   -     TSH REFLEX TO T4    5. Immunity status testing: Requesting immunity testing to hep B  -     HEPATITIS B SURF AB QUANT      Follow-up and Dispositions    · Return in about 2 months (around 10/23/2019) for Follow-up.          Discussed expected course/resolution/complications of diagnosis in detail with patient.    Medication risks/benefits/costs/interactions/alternatives discussed with patient.    Pt was given an after visit summary which includes diagnoses, current medications & vitals.  Pt expressed understanding with the diagnosis and plan

## 2019-08-23 NOTE — PROGRESS NOTES
Identified pt with two pt identifiers(name and ). Reviewed record in preparation for visit and have obtained necessary documentation. Chief Complaint   Patient presents with    Follow-up        Health Maintenance Due   Topic    Pneumococcal 0-64 years (1 of 1 - PPSV23)    DTaP/Tdap/Td series (1 - Tdap)    PAP AKA CERVICAL CYTOLOGY     Influenza Age 5 to Adult        Coordination of Care Questionnaire:   1) Have you been to an emergency room, urgent care, or hospitalized since your last visit? If yes, where when, and reason for visit? Yes, heart palpatations      2. Have seen or consulted any other health care provider since your last visit? If yes, where when, and reason for visit? NO      3) Do you have an Advanced Directive/ Living Will in place? NO  If yes, do we have a copy on file NO  If no, would you like information NO    Patient is accompanied by self I have received verbal consent from Roxanna Waters to discuss any/all medical information while they are present in the room.     Visit Vitals  BP (!) 140/104 (BP 1 Location: Left arm, BP Patient Position: Sitting)   Pulse 84   Temp 98.4 °F (36.9 °C) (Oral)   Resp 20   Ht 5' 7\" (1.702 m)   Wt (!) 436 lb (197.8 kg)   SpO2 98%   BMI 68.29 kg/m²

## 2019-08-23 NOTE — PATIENT INSTRUCTIONS
DASH Diet: Care Instructions  Your Care Instructions    The DASH diet is an eating plan that can help lower your blood pressure. DASH stands for Dietary Approaches to Stop Hypertension. Hypertension is high blood pressure. The DASH diet focuses on eating foods that are high in calcium, potassium, and magnesium. These nutrients can lower blood pressure. The foods that are highest in these nutrients are fruits, vegetables, low-fat dairy products, nuts, seeds, and legumes. But taking calcium, potassium, and magnesium supplements instead of eating foods that are high in those nutrients does not have the same effect. The DASH diet also includes whole grains, fish, and poultry. The DASH diet is one of several lifestyle changes your doctor may recommend to lower your high blood pressure. Your doctor may also want you to decrease the amount of sodium in your diet. Lowering sodium while following the DASH diet can lower blood pressure even further than just the DASH diet alone. Follow-up care is a key part of your treatment and safety. Be sure to make and go to all appointments, and call your doctor if you are having problems. It's also a good idea to know your test results and keep a list of the medicines you take. How can you care for yourself at home? Following the DASH diet  · Eat 4 to 5 servings of fruit each day. A serving is 1 medium-sized piece of fruit, ½ cup chopped or canned fruit, 1/4 cup dried fruit, or 4 ounces (½ cup) of fruit juice. Choose fruit more often than fruit juice. · Eat 4 to 5 servings of vegetables each day. A serving is 1 cup of lettuce or raw leafy vegetables, ½ cup of chopped or cooked vegetables, or 4 ounces (½ cup) of vegetable juice. Choose vegetables more often than vegetable juice. · Get 2 to 3 servings of low-fat and fat-free dairy each day. A serving is 8 ounces of milk, 1 cup of yogurt, or 1 ½ ounces of cheese. · Eat 6 to 8 servings of grains each day.  A serving is 1 slice of bread, 1 ounce of dry cereal, or ½ cup of cooked rice, pasta, or cooked cereal. Try to choose whole-grain products as much as possible. · Limit lean meat, poultry, and fish to 2 servings each day. A serving is 3 ounces, about the size of a deck of cards. · Eat 4 to 5 servings of nuts, seeds, and legumes (cooked dried beans, lentils, and split peas) each week. A serving is 1/3 cup of nuts, 2 tablespoons of seeds, or ½ cup of cooked beans or peas. · Limit fats and oils to 2 to 3 servings each day. A serving is 1 teaspoon of vegetable oil or 2 tablespoons of salad dressing. · Limit sweets and added sugars to 5 servings or less a week. A serving is 1 tablespoon jelly or jam, ½ cup sorbet, or 1 cup of lemonade. · Eat less than 2,300 milligrams (mg) of sodium a day. If you limit your sodium to 1,500 mg a day, you can lower your blood pressure even more. Tips for success  · Start small. Do not try to make dramatic changes to your diet all at once. You might feel that you are missing out on your favorite foods and then be more likely to not follow the plan. Make small changes, and stick with them. Once those changes become habit, add a few more changes. · Try some of the following:  ? Make it a goal to eat a fruit or vegetable at every meal and at snacks. This will make it easy to get the recommended amount of fruits and vegetables each day. ? Try yogurt topped with fruit and nuts for a snack or healthy dessert. ? Add lettuce, tomato, cucumber, and onion to sandwiches. ? Combine a ready-made pizza crust with low-fat mozzarella cheese and lots of vegetable toppings. Try using tomatoes, squash, spinach, broccoli, carrots, cauliflower, and onions. ? Have a variety of cut-up vegetables with a low-fat dip as an appetizer instead of chips and dip. ? Sprinkle sunflower seeds or chopped almonds over salads. Or try adding chopped walnuts or almonds to cooked vegetables.   ? Try some vegetarian meals using beans and peas. Add garbanzo or kidney beans to salads. Make burritos and tacos with mashed gonzalez beans or black beans. Where can you learn more? Go to http://will-rafy.info/. Enter Z614 in the search box to learn more about \"DASH Diet: Care Instructions. \"  Current as of: July 22, 2018  Content Version: 12.1  © 7545-9291 [employer]. Care instructions adapted under license by [employer] (which disclaims liability or warranty for this information). If you have questions about a medical condition or this instruction, always ask your healthcare professional. [address] any warranty or liability for your use of this information. DASH Diet: Care Instructions  Your Care Instructions    The DASH diet is an eating plan that can help lower your blood pressure. DASH stands for Dietary Approaches to Stop Hypertension. Hypertension is high blood pressure. The DASH diet focuses on eating foods that are high in calcium, potassium, and magnesium. These nutrients can lower blood pressure. The foods that are highest in these nutrients are fruits, vegetables, low-fat dairy products, nuts, seeds, and legumes. But taking calcium, potassium, and magnesium supplements instead of eating foods that are high in those nutrients does not have the same effect. The DASH diet also includes whole grains, fish, and poultry. The DASH diet is one of several lifestyle changes your doctor may recommend to lower your high blood pressure. Your doctor may also want you to decrease the amount of sodium in your diet. Lowering sodium while following the DASH diet can lower blood pressure even further than just the DASH diet alone. Follow-up care is a key part of your treatment and safety. Be sure to make and go to all appointments, and call your doctor if you are having problems. It's also a good idea to know your test results and keep a list of the medicines you take.   How can you care for yourself at home? Following the DASH diet  · Eat 4 to 5 servings of fruit each day. A serving is 1 medium-sized piece of fruit, ½ cup chopped or canned fruit, 1/4 cup dried fruit, or 4 ounces (½ cup) of fruit juice. Choose fruit more often than fruit juice. · Eat 4 to 5 servings of vegetables each day. A serving is 1 cup of lettuce or raw leafy vegetables, ½ cup of chopped or cooked vegetables, or 4 ounces (½ cup) of vegetable juice. Choose vegetables more often than vegetable juice. · Get 2 to 3 servings of low-fat and fat-free dairy each day. A serving is 8 ounces of milk, 1 cup of yogurt, or 1 ½ ounces of cheese. · Eat 6 to 8 servings of grains each day. A serving is 1 slice of bread, 1 ounce of dry cereal, or ½ cup of cooked rice, pasta, or cooked cereal. Try to choose whole-grain products as much as possible. · Limit lean meat, poultry, and fish to 2 servings each day. A serving is 3 ounces, about the size of a deck of cards. · Eat 4 to 5 servings of nuts, seeds, and legumes (cooked dried beans, lentils, and split peas) each week. A serving is 1/3 cup of nuts, 2 tablespoons of seeds, or ½ cup of cooked beans or peas. · Limit fats and oils to 2 to 3 servings each day. A serving is 1 teaspoon of vegetable oil or 2 tablespoons of salad dressing. · Limit sweets and added sugars to 5 servings or less a week. A serving is 1 tablespoon jelly or jam, ½ cup sorbet, or 1 cup of lemonade. · Eat less than 2,300 milligrams (mg) of sodium a day. If you limit your sodium to 1,500 mg a day, you can lower your blood pressure even more. Tips for success  · Start small. Do not try to make dramatic changes to your diet all at once. You might feel that you are missing out on your favorite foods and then be more likely to not follow the plan. Make small changes, and stick with them. Once those changes become habit, add a few more changes. · Try some of the following:  ?  Make it a goal to eat a fruit or vegetable at every meal and at snacks. This will make it easy to get the recommended amount of fruits and vegetables each day. ? Try yogurt topped with fruit and nuts for a snack or healthy dessert. ? Add lettuce, tomato, cucumber, and onion to sandwiches. ? Combine a ready-made pizza crust with low-fat mozzarella cheese and lots of vegetable toppings. Try using tomatoes, squash, spinach, broccoli, carrots, cauliflower, and onions. ? Have a variety of cut-up vegetables with a low-fat dip as an appetizer instead of chips and dip. ? Sprinkle sunflower seeds or chopped almonds over salads. Or try adding chopped walnuts or almonds to cooked vegetables. ? Try some vegetarian meals using beans and peas. Add garbanzo or kidney beans to salads. Make burritos and tacos with mashed gonzalez beans or black beans. Where can you learn more? Go to http://will-rafy.info/. Enter X347 in the search box to learn more about \"DASH Diet: Care Instructions. \"  Current as of: July 22, 2018  Content Version: 12.1  © 7007-8043 [employer]. Care instructions adapted under license by [employer] (which disclaims liability or warranty for this information). If you have questions about a medical condition or this instruction, always ask your healthcare professional. [place] [address] any warranty or liability for your use of this information. Td (Tetanus, Diphtheria) Vaccine: What You Need to Know  Why get vaccinated? Tetanus and diphtheria are very serious diseases. They are rare in the [place] today, but people who do become infected often have severe complications. Td vaccine is used to protect adolescents and adults from both of these diseases. Both diphtheria and tetanus are infections caused by bacteria. Diphtheria spreads from person to person through secretions from coughing or sneezing.  Tetanus-causing bacteria enter the body through cuts, scratches, or wounds. TETANUS (lockjaw) causes painful muscle tightening and stiffness, usually all over the body. · It can lead to tightening of muscles in the head and neck so you can't open your mouth, swallow, or sometimes even breathe. Tetanus kills about 1 out of every 10 people who are infected even after receiving the best medical care. DIPHTHERIA can cause a thick coating to form in the back of the throat. · It can lead to breathing problems, heart failure, paralysis, and death. Before vaccines, as many as 200,000 cases of diphtheria and hundreds of cases of tetanus were reported in the [place] each year. Since vaccination began, reports of cases for both diseases have dropped by about 99%. Td vaccine  Td vaccine can protect adolescents and adults from tetanus and diphtheria. Td is usually given as a booster dose every 10 years, but it can also be given earlier after a severe and dirty wound or burn. Another vaccine, called Tdap, which protects against pertussis in addition to tetanus and diphtheria, is sometimes recommended instead of Td vaccine. Your doctor or the person giving you the vaccine can give you more information. Td may safely be given at the same time as other vaccines. Some people should not get this vaccine  · A person who has ever had a life-threatening allergic reaction after a previous dose of any tetanus- or diphtheria-containing vaccine, OR has a severe allergy to any part of this vaccine, should not get Td vaccine. Tell the person giving the vaccine about any severe allergies. · Talk to your doctor if you:  ? Had severe pain or swelling after any vaccine containing diphtheria or tetanus. ? Ever had a condition called Guillain Barré Syndrome (GBS). ? Aren't feeling well on the day the shot is scheduled. Risks of a vaccine reaction  With any medicine, including vaccines, there is a chance of side effects. These are usually mild and go away on their own.  Serious reactions are also possible but are rare. Most people who get Td vaccine do not have any problems with it. Mild problems, following Td vaccine  (Did not interfere with activities)  · Pain where the shot was given (about 8 people in 10)  · Redness or swelling where the shot was given (about 1 person in 4)  · Mild fever (rare)  · Headache (about 1 person in 4)  · Tiredness (about 1 person in 4)  Moderate problems, following Td vaccine  (Interfered with activities, but did not require medical attention)  · Fever over 102°F (rare)  Severe problems, following Td vaccine  (Unable to perform usual activities; required medical attention)  · Swelling, severe pain, bleeding, and/or redness in the arm where the shot was given (rare)  Problems that could happen after any vaccine:  · People sometimes faint after a medical procedure, including vaccination. Sitting or lying down for about 15 minutes can help prevent fainting, and injuries caused by a fall. Tell your doctor if you feel dizzy or have vision changes or ringing in the ears. · Some people get severe pain in the shoulder and have difficulty moving the arm where a shot was given. This happens very rarely. · Any medication can cause a severe allergic reaction. Such reactions from a vaccine are very rare, estimated at fewer than 1 in a million doses, and would happen within a few minutes to a few hours after the vaccination. As with any medicine, there is a very remote chance of a vaccine causing a serious injury or death. The safety of vaccines is always being monitored. For more information, visit: www.cdc.gov/vaccinesafety. What if there is a serious reaction? What should I look for? · Look for anything that concerns you, such as signs of a severe allergic reaction, very high fever, or unusual behavior. Signs of a severe allergic reaction can include hives, swelling of the face and throat, difficulty breathing, a fast heartbeat, dizziness, and weakness.  These would usually start a few minutes to a few hours after the vaccination. What should I do? · If you think it is a severe allergic reaction or other emergency that can't wait, call 9-1-1 or get the person to the nearest hospital. Otherwise, call your doctor. · Afterward, the reaction should be reported to the Vaccine Adverse Event Reporting System (VAERS). Your doctor might file this report, or you can do it yourself through the VAERS web site at www.vaers. [facility].gov, or by calling 8-225.597.6480. VAERS does not give medical advice. The National Vaccine Injury Compensation Program  The National Vaccine Injury Compensation Program (VICP) is a federal program that was created to compensate people who may have been injured by certain vaccines. Persons who believe they may have been injured by a vaccine can learn about the program and about filing a claim by calling 4-414.481.7011 or visiting the [employer] website at www.[place].gov/vaccinecompensation. There is a time limit to file a claim for compensation. How can I learn more? · Ask your doctor. He or she can give you the vaccine package insert or suggest other sources of information. · Call your local or state health department. · Contact the Centers for Disease Control and Prevention (CDC):  ? Call 2-218.983.6342 (1-800-CDC-INFO)  ? Visit CDC's website at www.cdc.gov/vaccines  Vaccine Information Statement  Td Vaccine  (4/11/2017)  42 [address] [employer] [id number]  Department of Health and Human Services  Centers for Disease Control and Prevention  Many Vaccine Information Statements are available in [place] and other languages. See www.immunize.org/vis. Hojas de información Sobre Vacunas están disponibles en español y en muchos otros idiomas. Visite www.immunize.org/vis. Care instructions adapted under license by [employer] (which disclaims liability or warranty for this information).  If you have questions about a medical condition or this instruction, always ask your healthcare professional. [address] any warranty or liability for your use of this information. Influenza (Flu) Vaccine (Inactivated or Recombinant): What You Need to Know  Why get vaccinated? Influenza (\"flu\") is a contagious disease that spreads around the [facility] every winter, usually between October and May. Flu is caused by influenza viruses and is spread mainly by coughing, sneezing, and close contact. Anyone can get flu. Flu strikes suddenly and can last several days. Symptoms vary by age, but can include:  · Fever/chills. · Sore throat. · Muscle aches. · Fatigue. · Cough. · Headache. · Runny or stuffy nose. Flu can also lead to pneumonia and blood infections, and cause diarrhea and seizures in children. If you have a medical condition, such as heart or lung disease, flu can make it worse. Flu is more dangerous for some people. Infants and young children, people 72years of age and older, pregnant women, and people with certain health conditions or a weakened immune system are at greatest risk. Each year thousands of people in the [facility] die from flu, and many more are hospitalized. Flu vaccine can:  · Keep you from getting flu. · Make flu less severe if you do get it. · Keep you from spreading flu to your family and other people. Inactivated and recombinant flu vaccines  A dose of flu vaccine is recommended every flu season. Children 6 months through 6years of age may need two doses during the same flu season. Everyone else needs only one dose each flu season. Some inactivated flu vaccines contain a very small amount of a mercury-based preservative called thimerosal. Studies have not shown thimerosal in vaccines to be harmful, but flu vaccines that do not contain thimerosal are available. There is no live flu virus in flu shots. They cannot cause the flu. There are many flu viruses, and they are always changing.  Each year a new flu vaccine is made to protect against three or four viruses that are likely to cause disease in the upcoming flu season. But even when the vaccine doesn't exactly match these viruses, it may still provide some protection. Flu vaccine cannot prevent:  · Flu that is caused by a virus not covered by the vaccine. · Illnesses that look like flu but are not. Some people should not get this vaccine  Tell the person who is giving you the vaccine:  · If you have any severe (life-threatening) allergies. If you ever had a life-threatening allergic reaction after a dose of flu vaccine, or have a severe allergy to any part of this vaccine, you may be advised not to get vaccinated. Most, but not all, types of flu vaccine contain a small amount of egg protein. · If you ever had Guillain-Barré syndrome (also called GBS) Some people with a history of GBS should not get this vaccine. This should be discussed with your doctor. · If you are not feeling well. It is usually okay to get flu vaccine when you have a mild illness, but you might be asked to come back when you feel better. Risks of a vaccine reaction  With any medicine, including vaccines, there is a chance of reactions. These are usually mild and go away on their own, but serious reactions are also possible. Most people who get a flu shot do not have any problems with it. Minor problems following a flu shot include:  · Soreness, redness, or swelling where the shot was given  · Hoarseness  · Sore, red or itchy eyes  · Cough  · Fever  · Aches  · Headache  · Itching  · Fatigue  If these problems occur, they usually begin soon after the shot and last 1 or 2 days. More serious problems following a flu shot can include the following:  · There may be a small increased risk of Guillain-Barré Syndrome (GBS) after inactivated flu vaccine. This risk has been estimated at 1 or 2 additional cases per million people vaccinated.  This is much lower than the risk of severe complications from flu, which can be prevented by flu vaccine. · Naylor Big children who get the flu shot along with pneumococcal vaccine (PCV13) and/or DTaP vaccine at the same time might be slightly more likely to have a seizure caused by fever. Ask your doctor for more information. Tell your doctor if a child who is getting flu vaccine has ever had a seizure  Problems that could happen after any injected vaccine:  · People sometimes faint after a medical procedure, including vaccination. Sitting or lying down for about 15 minutes can help prevent fainting, and injuries caused by a fall. Tell your doctor if you feel dizzy, or have vision changes or ringing in the ears. · Some people get severe pain in the shoulder and have difficulty moving the arm where a shot was given. This happens very rarely. · Any medication can cause a severe allergic reaction. Such reactions from a vaccine are very rare, estimated at about 1 in a million doses, and would happen within a few minutes to a few hours after the vaccination. As with any medicine, there is a very remote chance of a vaccine causing a serious injury or death. The safety of vaccines is always being monitored. For more information, visit: www.cdc.gov/vaccinesafety/. What if there is a serious reaction? What should I look for? · Look for anything that concerns you, such as signs of a severe allergic reaction, very high fever, or unusual behavior. Signs of a severe allergic reaction can include hives, swelling of the face and throat, difficulty breathing, a fast heartbeat, dizziness, and weakness - usually within a few minutes to a few hours after the vaccination. What should I do? · If you think it is a severe allergic reaction or other emergency that can't wait, call 9-1-1 and get the person to the nearest hospital. Otherwise, call your doctor. · Reactions should be reported to the \"Vaccine Adverse Event Reporting System\" (VAERS).  Your doctor should file this report, or you can do it yourself through the VAERS website at www.vaers. [facility].gov, or by calling 4-101.740.4933. VAERS does not give medical advice. The National Vaccine Injury Compensation Program  The National Vaccine Injury Compensation Program (VICP) is a federal program that was created to compensate people who may have been injured by certain vaccines. Persons who believe they may have been injured by a vaccine can learn about the program and about filing a claim by calling 9-503.568.6016 or visiting the [employer] website at www.[facility].gov/vaccinecompensation. There is a time limit to file a claim for compensation. How can I learn more? · Ask your healthcare provider. He or she can give you the vaccine package insert or suggest other sources of information. · Call your local or state health department. · Contact the Centers for Disease Control and Prevention (CDC):  ? Call 0-407.620.5243 (1-800-CDC-INFO) or  ? Visit CDC's website at www.cdc.gov/flu  Vaccine Information Statement  Inactivated Influenza Vaccine  8/7/2015)  42 ROMMEL [employer] [employer] [id number]  Department of Health and Human Services  Centers for Disease Control and Prevention  Many Vaccine Information Statements are available in Chinese and other languages. See www.immunize.org/vis. Muchas hojas de información sobre vacunas están disponibles en español y en otros idiomas. Visite www.immunize.org/vis. Care instructions adapted under license by [employer] (which disclaims liability or warranty for this information). If you have questions about a medical condition or this instruction, always ask your healthcare professional. [place] [address] any warranty or liability for your use of this information.

## 2019-08-26 LAB
BUN SERPL-MCNC: 13 MG/DL (ref 6–20)
BUN/CREAT SERPL: 21 (ref 9–23)
CALCIUM SERPL-MCNC: 8.9 MG/DL (ref 8.7–10.2)
CHLORIDE SERPL-SCNC: 98 MMOL/L (ref 96–106)
CO2 SERPL-SCNC: 19 MMOL/L (ref 20–29)
CREAT SERPL-MCNC: 0.61 MG/DL (ref 0.57–1)
GLUCOSE SERPL-MCNC: 82 MG/DL (ref 65–99)
HBV SURFACE AB SER-ACNC: NORMAL MIU/ML
POTASSIUM SERPL-SCNC: 3.8 MMOL/L (ref 3.5–5.2)
SODIUM SERPL-SCNC: 138 MMOL/L (ref 134–144)
TSH SERPL DL<=0.005 MIU/L-ACNC: 1.9 UIU/ML (ref 0.45–4.5)

## 2019-09-03 NOTE — PROGRESS NOTES
[employer] message sent:   Florafiene Riddles,     Your labs look good. There were no significant abnormalities on your metabolic panel. Your thyroid level was within normal range. Your immunity testing for hepatitis B was canceled by [facility] due to an insufficient specimen. If you would like to proceed with hepatitis B immunity testing, please let me know and I will re-order for you. Please let me know if you have any additional questions.     Tomi Oakley, NP

## 2019-09-17 DIAGNOSIS — I10 ESSENTIAL HYPERTENSION: ICD-10-CM

## 2019-09-17 RX ORDER — AMLODIPINE BESYLATE 10 MG/1
10 TABLET ORAL DAILY
Qty: 30 TAB | Refills: 1 | Status: SHIPPED | OUTPATIENT
Start: 2019-09-17 | End: 2019-10-14 | Stop reason: SDUPTHER

## 2019-09-17 NOTE — TELEPHONE ENCOUNTER
Pharmacy is calling in a refill request for the following Rx: . Kady Pinto Requested Prescriptions     Pending Prescriptions Disp Refills    amLODIPine (NORVASC) 10 mg tablet 30 Tab 1     Sig: Take 1 Tab by mouth daily.  Indications: high blood pressure     [id number] [address]  [phone]

## 2019-09-17 NOTE — TELEPHONE ENCOUNTER
PCP: Aiyana Shah NP    Last appt: 8/23/2019  Future Appointments   Date Time Provider Maame Romero   9/19/2019 10:40 AM Manfred Skaggs NP [facility] DANNIE SCHED   10/24/2019 10:45 AM Aiyana Shah NP [facility] DANNIE SCHED       Requested Prescriptions     Pending Prescriptions Disp Refills    amLODIPine (NORVASC) 10 mg tablet 30 Tab 1     Sig: Take 1 Tab by mouth daily.  Indications: high blood pressure     [phone] (home)   LOV and Labs 8/23/19

## 2019-10-14 DIAGNOSIS — I10 ESSENTIAL HYPERTENSION: ICD-10-CM

## 2019-10-14 NOTE — TELEPHONE ENCOUNTER
Pharmacy is requesting medication refill   Requested Prescriptions     Pending Prescriptions Disp Refills    amLODIPine (NORVASC) 10 mg tablet 30 Tab 1     Sig: Take 1 Tab by mouth daily.  Indications: high blood pressure       Pharmacy on file verified  [phone]

## 2019-10-15 RX ORDER — AMLODIPINE BESYLATE 10 MG/1
10 TABLET ORAL DAILY
Qty: 30 TAB | Refills: 0 | Status: SHIPPED | OUTPATIENT
Start: 2019-10-15 | End: 2019-11-08 | Stop reason: SDUPTHER

## 2019-10-15 NOTE — TELEPHONE ENCOUNTER
Pt is requesting a refill for 90 days supply  She is also requesting Rx for the NeXium, as its expensive OTC and her insurance will cover it if Dr write a script.   .  Requested Prescriptions     Pending Prescriptions Disp Refills    metFORMIN ER (GLUCOPHAGE XR) 500 mg tablet 30 Tab 1    escitalopram oxalate (LEXAPRO) 10 mg tablet  1     LOV :  August 23, 2019 09:45 AM  Last refill :   Met-7/29/2019  Rogelio-8/23/2019  Pharmacy verified

## 2019-10-15 NOTE — TELEPHONE ENCOUNTER
LOV 8/23/19 scheduled 11/1/19. Called pt to let her know that I would forward the refill on the metformin and lexapro but will need to discuss at upcoming appt for the nexium. We had omeprazole on file. Pt states understanding.

## 2019-10-16 RX ORDER — METFORMIN HYDROCHLORIDE 500 MG/1
TABLET, EXTENDED RELEASE ORAL
Qty: 90 TAB | Refills: 0 | Status: SHIPPED | OUTPATIENT
Start: 2019-10-16 | End: 2020-02-12 | Stop reason: SDUPTHER

## 2019-10-16 RX ORDER — ESCITALOPRAM OXALATE 10 MG/1
TABLET ORAL
Qty: 90 TAB | Refills: 0 | Status: SHIPPED | OUTPATIENT
Start: 2019-10-16 | End: 2020-01-27

## 2019-11-01 ENCOUNTER — OFFICE VISIT (OUTPATIENT)
Dept: FAMILY MEDICINE CLINIC | Age: 29
End: 2019-11-01

## 2019-11-01 VITALS
SYSTOLIC BLOOD PRESSURE: 141 MMHG | DIASTOLIC BLOOD PRESSURE: 101 MMHG | TEMPERATURE: 98.2 F | HEART RATE: 96 BPM | RESPIRATION RATE: 20 BRPM | OXYGEN SATURATION: 99 % | WEIGHT: 293 LBS | HEIGHT: 67 IN | BODY MASS INDEX: 45.99 KG/M2

## 2019-11-01 DIAGNOSIS — R53.82 CHRONIC FATIGUE: ICD-10-CM

## 2019-11-01 DIAGNOSIS — I10 ESSENTIAL HYPERTENSION: Primary | ICD-10-CM

## 2019-11-01 DIAGNOSIS — G47.9 SLEEP DIFFICULTIES: ICD-10-CM

## 2019-11-01 DIAGNOSIS — J06.9 URI, ACUTE: ICD-10-CM

## 2019-11-01 DIAGNOSIS — R06.83 LOUD SNORING: ICD-10-CM

## 2019-11-01 DIAGNOSIS — E66.01 OBESITY, MORBID (HCC): ICD-10-CM

## 2019-11-01 RX ORDER — CHLORTHALIDONE 50 MG/1
50 TABLET ORAL DAILY
Qty: 30 TAB | Refills: 1 | Status: SHIPPED | OUTPATIENT
Start: 2019-11-01 | End: 2019-12-04 | Stop reason: SDUPTHER

## 2019-11-01 NOTE — PROGRESS NOTES
[facility] Note  Subjective:      Valentine Madrigal is a 30 y.o. female who presents for an acute visit with the following chief complaints. Chief Complaint   Patient presents with    Hypertension     follow up    Cough     started 6 days ago with nasal congestion, runny nose, sneezing, and then cough started. Taking Coricidin HBP  OTC cold medication.  Nasal Congestion     Cardiovascular Review:  The patient has hypertension, paroxymal a fib, aortic valve regurgitation, obesity. Cardiologist is Dr. Burman Curling. Follow-up in 1 year with echo.   Diet and Lifestyle: sedentary, nonsmoker  Medications: Norvasc 10 mg increased from 5 mg 8 weeks ago, chlorthalidone 25 mg. Home BP Monitoring: Not well controlled at home, 130-140's/90's   Weight is stable. Cut out higher calorie beverages. Pertinent ROS: taking medications as instructed, no medication side effects noted, no TIA's, no chest pain on exertion, no dyspnea on exertion, no swelling of ankles.        Upper Respiratory Infection  Patient complains of symptoms of a URI. Symptoms include: sore scratchy throat, nasal congestion, cough. Cough is minimally productive. Onset of symptoms was 6 days ago, initially improved since onset. She is drinking moderate amounts of fluids. Evaluation to date: none. Treatment to date: OTC products. Coricidin, vitamin C with little relief. Current Outpatient Medications   Medication Sig Dispense Refill    chlorthalidone (HYGROTEN) 50 mg tablet Take 1 Tab by mouth daily. Indications: high blood pressure 30 Tab 1    albuterol (PROVENTIL VENTOLIN) 2.5 mg /3 mL (0.083 %) nebu Take 3 mL by inhalation every four (4) hours as needed for Cough or Other (SOB).  30 Nebule 0    metFORMIN ER (GLUCOPHAGE XR) 500 mg tablet TAKE 1 TABLET BY MOUTH EVERY DAY IN THE EVENING WITH DINNER 90 Tab 0    escitalopram oxalate (LEXAPRO) 10 mg tablet TAKE 1 TABLET BY MOUTH EVERY DAY 90 Tab 0    amLODIPine (NORVASC) 10 mg tablet Take 1 Tab by mouth daily. Indications: high blood pressure 30 Tab 0    ADVAIR DISKUS 250-50 mcg/dose diskus inhaler INHALE 1 PUFF BY MOUTH TWICE A DAY  0    omeprazole (PRILOSEC OTC) 20 mg tablet Take 20 mg by mouth.  Desvenlafaxine 100 mg Tb24 TAKE 1 TABLET BY MOUTH EVERY DAY  10    ENSKYCE 0.15-0.03 mg tab TAKE 1 TABLET BY MOUTH ONCE DAILY  3    albuterol (PROVENTIL HFA, VENTOLIN HFA, PROAIR HFA) 90 mcg/actuation inhaler Take 1 Puff by inhalation every six (6) hours as needed for Wheezing. 1 Inhaler 6     Allergies   Allergen Reactions    Ibuprofen Nausea and Vomiting       ROS:   Complete review of systems was reviewed with pertinent information listed in HPI. Review of Systems   Constitutional: Negative for chills, diaphoresis, fever, malaise/fatigue and weight loss. HENT: Positive for congestion and sinus pain. Negative for ear pain, hearing loss, sore throat and tinnitus. Eyes: Negative for blurred vision. Respiratory: Positive for cough and sputum production. Negative for hemoptysis, shortness of breath and wheezing. Cardiovascular: Negative for chest pain and palpitations. Gastrointestinal: Negative for abdominal pain, diarrhea, heartburn, nausea and vomiting. Genitourinary: Negative for dysuria. Musculoskeletal: Negative for myalgias. Skin: Negative for rash. Neurological: Negative for dizziness and headaches. Objective:     Visit Vitals  BP (!) 141/101   Pulse 96   Temp 98.2 °F (36.8 °C) (Oral)   Resp 20   Ht 5' 7\" (1.702 m)   Wt (!) 444 lb (201.4 kg)   SpO2 99%   BMI 69.54 kg/m²       Vitals and Nurse Documentation reviewed. Physical Exam   Constitutional: She is oriented to person, place, and time and well-developed, well-nourished, and in no distress. She does not have a sickly appearance. HENT:   Head: Normocephalic and atraumatic.    Right Ear: Hearing, external ear and ear canal normal. Tympanic membrane is not erythematous and not bulging. No middle ear effusion. Left Ear: Hearing, external ear and ear canal normal. Tympanic membrane is not erythematous and not bulging. No middle ear effusion. Nose: Mucosal edema present. No rhinorrhea. Right sinus exhibits no maxillary sinus tenderness and no frontal sinus tenderness. Left sinus exhibits no maxillary sinus tenderness and no frontal sinus tenderness. Mouth/Throat: Uvula is midline, oropharynx is clear and moist and mucous membranes are normal. Mucous membranes are not pale, not dry and not cyanotic. No oropharyngeal exudate, posterior oropharyngeal edema, posterior oropharyngeal erythema or tonsillar abscesses. Eyes: Pupils are equal, round, and reactive to light. Conjunctivae and EOM are normal. Right conjunctiva is not injected. Neck: Normal range of motion. Neck supple. No tracheal deviation present. No thyroid mass present. Cardiovascular: Normal rate, regular rhythm, S1 normal, S2 normal, normal heart sounds and intact distal pulses. Exam reveals no gallop and no friction rub. No murmur heard. Pulmonary/Chest: Effort normal and breath sounds normal. No respiratory distress. She has no wheezes. She has no rales. She exhibits no tenderness. Musculoskeletal: She exhibits no edema. Lymphadenopathy:     She has no cervical adenopathy. Neurological: She is alert and oriented to person, place, and time. Gait normal.   Skin: Skin is warm and dry. No rash noted. She is not diaphoretic. Psychiatric: Mood and affect normal.   Nursing note and vitals reviewed. Assessment/Plan:     Diagnoses and all orders for this visit:    1. Essential hypertension: Not at goal, continue Norvasc 10 mg daily, increase chlorthalidone from 25 to 50 mg daily. 8 week follow-up. -     chlorthalidone (HYGROTEN) 50 mg tablet; Take 1 Tab by mouth daily. Indications: high blood pressure    2. URI, acute: Discussed likely viral etiology with anticipated length of illness lasting about 7 days. Continue symptomatic therapy; Rest, increase fluids, NSAID's PRN fever and/or discomfort. Mucinex, nasal saline rinses for nasal congestion. RTC if symptoms worsen or do not resolve. 3. Obesity, morbid ([address]): I have reviewed/discussed the above normal BMI with the patient. I have recommended the following interventions: dietary management education, guidance, and counseling, encourage exercise, monitor weight and prescribed dietary intake. Given written instructions as well. -     SLEEP MEDICINE REFERRAL    4. Chronic fatigue: Chronic daily fatigue with frequent night awakening and loud snoring. Agreeable to PRISCILA screening.   -     SLEEP MEDICINE REFERRAL    5. Loud snoring  -     SLEEP MEDICINE REFERRAL    6. Sleep difficulties        Follow-up and Dispositions    · Return in about 6 months (around 5/1/2020), or if symptoms worsen or fail to improve.        Discussed expected course/resolution/complications of diagnosis in detail with patient.    Medication risks/benefits/costs/interactions/alternatives discussed with patient.    Pt was given an after visit summary which includes diagnoses, current medications & vitals.  Pt expressed understanding with the diagnosis and plan

## 2019-11-01 NOTE — PATIENT INSTRUCTIONS
Check home BP once weekly for the next 3 weeks. Call with readings. If your cold symptoms do not improve by Monday, please call or return to clinic. Sleep study for sleep apnea screening   Daily exercise for 15-20 minutes.

## 2019-12-02 DIAGNOSIS — I10 ESSENTIAL HYPERTENSION: ICD-10-CM

## 2019-12-02 DIAGNOSIS — J45.40 MODERATE PERSISTENT ASTHMA WITHOUT COMPLICATION: ICD-10-CM

## 2019-12-02 NOTE — TELEPHONE ENCOUNTER
Pharmacy on file verified. LOV 1/01/19    . Requested Prescriptions     Pending Prescriptions Disp Refills    albuterol (PROVENTIL VENTOLIN) 2.5 mg /3 mL (0.083 %) nebu 30 Nebule 0     Sig: Take 3 mL by inhalation every four (4) hours as needed for Cough or Other (SOB).

## 2019-12-04 NOTE — TELEPHONE ENCOUNTER
Also pharm is requesting the following meds. Last refill. 11/01/19    Requested Prescriptions     Pending Prescriptions Disp Refills    albuterol (PROVENTIL VENTOLIN) 2.5 mg /3 mL (0.083 %) nebu 30 Nebule 0     Sig: Take 3 mL by inhalation every four (4) hours as needed for Cough or Other (SOB).  chlorthalidone (HYGROTEN) 50 mg tablet 30 Tab 1     Sig: Take 1 Tab by mouth daily.  Indications: high blood pressure

## 2019-12-06 RX ORDER — ALBUTEROL SULFATE 0.83 MG/ML
2.5 SOLUTION RESPIRATORY (INHALATION)
Qty: 30 NEBULE | Refills: 0 | Status: SHIPPED | OUTPATIENT
Start: 2019-12-06 | End: 2020-01-03 | Stop reason: SDUPTHER

## 2019-12-06 RX ORDER — CHLORTHALIDONE 50 MG/1
50 TABLET ORAL DAILY
Qty: 30 TAB | Refills: 0 | Status: SHIPPED | OUTPATIENT
Start: 2019-12-06 | End: 2019-12-27 | Stop reason: SDUPTHER

## 2019-12-06 NOTE — TELEPHONE ENCOUNTER
ESTEBANM for return call. Pt called back let her know that she needs ov and asked her to send us some BP readings via [employer].

## 2019-12-06 NOTE — TELEPHONE ENCOUNTER
Please call patient. She was to call or send message with BP readings after chlorthalidone dose increase from 25 to 50 mg -30 days supply provided. Needs follow-up prior to additional refills.

## 2020-01-03 DIAGNOSIS — J45.40 MODERATE PERSISTENT ASTHMA WITHOUT COMPLICATION: ICD-10-CM

## 2020-01-03 NOTE — TELEPHONE ENCOUNTER
Pharm on file verified. They are requesting a refill on the following meds. Requested Prescriptions     Pending Prescriptions Disp Refills    albuterol (PROVENTIL VENTOLIN) 2.5 mg /3 mL (0.083 %) nebu 30 Nebule 0     Sig: Take 3 mL by inhalation every four (4) hours as needed for Cough or Other (SOB).

## 2020-01-07 RX ORDER — ALBUTEROL SULFATE 0.83 MG/ML
2.5 SOLUTION RESPIRATORY (INHALATION)
Qty: 30 NEBULE | Refills: 1 | Status: SHIPPED | OUTPATIENT
Start: 2020-01-07 | End: 2020-02-21 | Stop reason: SDUPTHER

## 2020-01-07 NOTE — TELEPHONE ENCOUNTER
PCP: Leonides Jordan NP    Last appt: 11/1/2019  No future appointments. Requested Prescriptions     Pending Prescriptions Disp Refills    albuterol (PROVENTIL VENTOLIN) 2.5 mg /3 mL (0.083 %) nebu 30 Nebule 0     Sig: Take 3 mL by inhalation every four (4) hours as needed for Cough or Other (SOB).

## 2020-01-25 ENCOUNTER — OFFICE VISIT (OUTPATIENT)
Dept: URGENT CARE | Age: 30
End: 2020-01-25

## 2020-01-25 VITALS
DIASTOLIC BLOOD PRESSURE: 96 MMHG | BODY MASS INDEX: 45.99 KG/M2 | RESPIRATION RATE: 16 BRPM | HEIGHT: 67 IN | HEART RATE: 97 BPM | OXYGEN SATURATION: 98 % | SYSTOLIC BLOOD PRESSURE: 135 MMHG | TEMPERATURE: 98.4 F | WEIGHT: 293 LBS

## 2020-01-25 DIAGNOSIS — B37.2 INTERTRIGINOUS CANDIDIASIS: Primary | ICD-10-CM

## 2020-01-25 RX ORDER — CHLORPHENIRAMINE MALEATE 4 MG
TABLET ORAL 2 TIMES DAILY
Qty: 60 G | Refills: 0 | Status: SHIPPED | OUTPATIENT
Start: 2020-01-25 | End: 2020-02-15

## 2020-01-25 RX ORDER — ESCITALOPRAM OXALATE 10 MG/1
TABLET ORAL
Qty: 90 TAB | Refills: 0 | Status: CANCELLED | OUTPATIENT
Start: 2020-01-25

## 2020-01-25 RX ORDER — CEPHALEXIN 500 MG/1
500 CAPSULE ORAL 3 TIMES DAILY
Qty: 21 CAP | Refills: 0 | Status: SHIPPED | OUTPATIENT
Start: 2020-01-25 | End: 2020-02-01

## 2020-01-25 NOTE — PATIENT INSTRUCTIONS
See your PCP if not improved over next 2 weeks Follow up immediately for new, worsening or changes Yeast Skin Infection: Care Instructions Your Care Instructions Yeast normally lives on your skin. Sometimes too much yeast can overgrow in certain areas of the skin and cause an infection. The infection causes red, scaly, moist patches on your skin that may itch. Common areas for skin yeast infections are skin folds under the breasts or belly area. The warm and moist areas in the skin folds can make it easier for yeast to overgrow. Yeast infections also can be found on other parts of the body such as the groin or armpits. You will probably get a cream or ointment that contains an antifungal medicine. Examples of these are miconazole and clotrimazole. You put it on your skin to treat the infection. Your doctor may give you a prescription for the cream or ointment. Or you may be able to buy it without a prescription at most drugstores. If the infection is severe, the doctor will prescribe antifungal pills. A yeast infection usually goes away after about a week of treatment. But it's important to use the medicine for as long as your doctor tells you to. Follow-up care is a key part of your treatment and safety. Be sure to make and go to all appointments, and call your doctor if you are having problems. It's also a good idea to know your test results and keep a list of the medicines you take. How can you care for yourself at home? · Be safe with medicines. Take your medicines exactly as prescribed. Call your doctor if you think you are having a problem with your medicine. · Keep your skin clean and dry. Your doctor may suggest using powder that contains an antifungal medicine in the skin folds. · Wear loose clothing. When should you call for help? Call your doctor now or seek immediate medical care if: 
  · You have symptoms of infection, such as: 
? Increased pain, swelling, warmth, or redness. ? Red streaks leading from the area. ? Pus draining from the area. ? A fever.  
 Watch closely for changes in your health, and be sure to contact your doctor if: 
  · You do not get better as expected. Where can you learn more? Go to http://will-rafy.info/. Enter D777 in the search box to learn more about \"Yeast Skin Infection: Care Instructions. \" Current as of: April 1, 2019 Content Version: 12.2 © 6064-6995 [employer]. Care instructions adapted under license by [employer] (which disclaims liability or warranty for this information). If you have questions about a medical condition or this instruction, always ask your healthcare professional. [address] any warranty or liability for your use of this information.

## 2020-01-25 NOTE — PROGRESS NOTES
31 yo with hx of obesity, HTN, asthma here for itchy irritated rash under her left armpit  Tried 2 days of OTC anti-fungal cream and this didn't help much  Symptoms still present. Denies fever, chills, swelling. Feels well otherwise. 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  feet    VASCULAR SURGERY PROCEDURE UNLIST           Family History   Problem Relation Age of Onset    Mental Retardation Mother     Diabetes Maternal Grandmother     Heart Disease Maternal Grandmother     Hypertension Maternal Grandmother     Stroke Maternal Grandmother         Social History     Socioeconomic History    Marital status:      Spouse name: Not on file    Number of children: Not on file    Years of education: Not on file    Highest education level: Not on file   Occupational History    Not on file   Social Needs    Financial resource strain: Not on file    Food insecurity:     Worry: Not on file     Inability: Not on file    Transportation needs:     Medical: Not on file     Non-medical: Not on file   Tobacco Use    Smoking status: Former Smoker    Smokeless tobacco: Never Used   Substance and Sexual Activity    Alcohol use: No    Drug use: No    Sexual activity: Yes     Partners: Male     Birth control/protection: Pill   Lifestyle    Physical activity:     Days per week: Not on file     Minutes per session: Not on file    Stress: Not on file   Relationships    Social connections:     Talks on phone: Not on file     Gets together: Not on file     Attends Alevism service: Not on file     Active member of club or organization: Not on file     Attends meetings of clubs or organizations: Not on file     Relationship status: Not on file    Intimate partner violence:     Fear of current or ex partner: Not on file     Emotionally abused: Not on file     Physically abused: Not on file     Forced sexual activity: Not on file   Other Topics Concern    Not on file   Social History Narrative    Not on file                ALLERGIES: Ibuprofen    Review of Systems   Gastrointestinal: Negative for nausea and vomiting. All other systems reviewed and are negative. Vitals:    01/25/20 1327   BP: (!) 135/96   Pulse: 97   Resp: 16   Temp: 98.4 °F (36.9 °C)   SpO2: 98%   Weight: (!) 446 lb (202.3 kg)   Height: 5' 7\" (1.702 m)       Physical Exam  Vitals signs reviewed. Constitutional:       Appearance: She is obese. Eyes:      Extraocular Movements: Extraocular movements intact. Cardiovascular:      Rate and Rhythm: Normal rate and regular rhythm. Pulses: Normal pulses. Heart sounds: Normal heart sounds. Pulmonary:      Effort: Pulmonary effort is normal.      Breath sounds: Normal breath sounds. Skin:     Capillary Refill: Capillary refill takes less than 2 seconds. Psychiatric:         Mood and Affect: Mood normal.         Behavior: Behavior normal.         MDM     Differential Diagnosis; Clinical Impression; Plan:       CLINICAL IMPRESSION:  (B37.2) Intertriginous candidiasis  (primary encounter diagnosis)    Orders Placed This Encounter      clotrimazole (LOTRIMIN) 1 % topical cream          Sig: Apply  to affected area two (2) times a day for 21 days. Dispense:  60 g          Refill:  0      cephALEXin (KEFLEX) 500 mg capsule          Sig: Take 1 Cap by mouth three (3) times daily for 7 days. Dispense:  21 Cap          Refill:  0      Plan:  Start clotrimazole topical   May use cephalexin for possible secondary bacterial infection  Advised PCP follow up in 1 week.         We have reviewed concerning signs/symptoms, normal vs abnormal progression of medical condition and when to seek immediate medical attention. Schedule with PCP or Urgent Care immediately for worsening or new symptoms. See your PCP if there is not at least some improvement in symptoms within the next 1 week  You should see your PCP for updates on your routine health maintenance.              Procedures

## 2020-01-27 RX ORDER — ESCITALOPRAM OXALATE 10 MG/1
TABLET ORAL
Qty: 90 TAB | Refills: 0 | Status: SHIPPED | OUTPATIENT
Start: 2020-01-27 | End: 2020-04-09 | Stop reason: SDUPTHER

## 2020-01-29 ENCOUNTER — TELEPHONE (OUTPATIENT)
Dept: FAMILY MEDICINE CLINIC | Age: 30
End: 2020-01-29

## 2020-01-29 DIAGNOSIS — E66.01 OBESITY, MORBID (HCC): Primary | ICD-10-CM

## 2020-01-29 NOTE — TELEPHONE ENCOUNTER
----- Message from Breanna Harrison sent at 1/29/2020  8:42 AM EST -----  Regarding: RE: Referral Request  Contact: [phone]    Dr. Romeo Coker.  ----- Message -----  From: Bertha Ramachandran LPN  Sent: 6/28/34 2:50 AM  To: Breanna Harrison  Subject: RE: Referral Request    Who are you seeing? [facility]      ----- Message -----     From: Breanna Harrison     Sent: 1/28/2020  3:41 PM EST       To: Michael Waters NP  Subject: RE: Referral Request    Can you send me a new referral to [place] [id number] Specialist. I have an apt on the 11th to talk about weight loss.

## 2020-02-11 ENCOUNTER — OFFICE VISIT (OUTPATIENT)
Dept: SURGERY | Age: 30
End: 2020-02-11

## 2020-02-11 ENCOUNTER — PATIENT MESSAGE (OUTPATIENT)
Dept: FAMILY MEDICINE CLINIC | Age: 30
End: 2020-02-11

## 2020-02-11 VITALS
OXYGEN SATURATION: 95 % | RESPIRATION RATE: 20 BRPM | SYSTOLIC BLOOD PRESSURE: 162 MMHG | HEIGHT: 67 IN | HEART RATE: 94 BPM | DIASTOLIC BLOOD PRESSURE: 91 MMHG | BODY MASS INDEX: 45.99 KG/M2 | WEIGHT: 293 LBS | TEMPERATURE: 98.2 F

## 2020-02-11 DIAGNOSIS — M25.561 CHRONIC PAIN OF BOTH KNEES: ICD-10-CM

## 2020-02-11 DIAGNOSIS — E78.2 MIXED HYPERLIPIDEMIA: ICD-10-CM

## 2020-02-11 DIAGNOSIS — G47.9 SLEEP DISORDER: ICD-10-CM

## 2020-02-11 DIAGNOSIS — E11.69 DIABETES MELLITUS TYPE 2 IN OBESE (HCC): ICD-10-CM

## 2020-02-11 DIAGNOSIS — E66.01 MORBID OBESITY WITH BMI OF 70 AND OVER, ADULT (HCC): Primary | ICD-10-CM

## 2020-02-11 DIAGNOSIS — R73.01 IMPAIRED FASTING BLOOD SUGAR: ICD-10-CM

## 2020-02-11 DIAGNOSIS — E66.01 OBESITY, MORBID (HCC): ICD-10-CM

## 2020-02-11 DIAGNOSIS — M25.562 CHRONIC PAIN OF BOTH KNEES: ICD-10-CM

## 2020-02-11 DIAGNOSIS — G89.29 CHRONIC PAIN OF BOTH KNEES: ICD-10-CM

## 2020-02-11 DIAGNOSIS — E66.9 DIABETES MELLITUS TYPE 2 IN OBESE (HCC): ICD-10-CM

## 2020-02-11 DIAGNOSIS — I10 ESSENTIAL HYPERTENSION: ICD-10-CM

## 2020-02-11 DIAGNOSIS — E28.2 PCOS (POLYCYSTIC OVARIAN SYNDROME): ICD-10-CM

## 2020-02-11 DIAGNOSIS — I10 ESSENTIAL HYPERTENSION: Primary | ICD-10-CM

## 2020-02-11 RX ORDER — BISMUTH SUBSALICYLATE 262 MG
1 TABLET,CHEWABLE ORAL DAILY
COMMUNITY

## 2020-02-11 RX ORDER — ASCORBIC ACID 500 MG
1000 TABLET ORAL
COMMUNITY
End: 2021-07-28

## 2020-02-11 NOTE — PROGRESS NOTES
HISTORY OF PRESENT ILLNESS  Judit Bean is a 30 y.o. female. HPI  Chief Complaint   Patient presents with    New Patient     new bariatric patient interested in lap gastric band    Morbid Obesity     Judit Bean is a 30year old white female with super morbid obesity. She presents today seeking surgical treatment for obesity. Body mass index is 70.09 kg/m². Interested in lap banding. Mother had gastric bypass \"years ago\" and has since  from drug related disease. \"she had lots of issues\" . She has cousins that have had bariatric surgery and done 'ok\".       On line seminar   pcp referred     High weight  447 lbs (current)   Low weight  342 lbs (2012)   Obese entire life     Diet:  2 meals / day \"eat late due to work schedule\"; gave up soda and switched to flavored water packets   24 hour Recall:  Blue berries   Polish sausage   Potatoes, onions and hamburger addie     Eats out 2-3x/ month     Max weight loss 20 lbs with unsupervised plans     Patient Active Problem List   Diagnosis Code    Anxiety F41.9    Depression F32.9    Obesity, morbid ([address] E66.01    Sleep disorder G47.9       Past Medical History:   Diagnosis Date    Anxiety and depression     anxiety    Asthma     Atrial fibrillation ([address]     Diabetes ([address]     Enlarged heart     Heart murmur     Hypertension     Knee pain, bilateral     Mixed hyperlipidemia     Morbid obesity ([address]     PCOS (polycystic ovarian syndrome)      Past Surgical History:   Procedure Laterality Date    CARDIAC SURG PROCEDURE UNLIST  age 4    2 heart valves \"sewn up\" as an infant   [facility] HX ORTHOPAEDIC Bilateral     as child knees     Social History     Socioeconomic History    Marital status:      Spouse name: Not on file    Number of children: Not on file    Years of education: Not on file    Highest education level: Not on file   Occupational History    Occupation: security     Comment: 3p-midnight    Social Needs    Financial resource strain: Not on file    Food insecurity:     Worry: Not on file     Inability: Not on file    Transportation needs:     Medical: Not on file     Non-medical: Not on file   Tobacco Use    Smoking status: Former Smoker     Types: Cigarettes     Last attempt to quit: 2018     Years since quittin.1    Smokeless tobacco: Never Used   Substance and Sexual Activity    Alcohol use: No    Drug use: No    Sexual activity: Yes     Partners: Male     Birth control/protection: Pill   Lifestyle    Physical activity:     Days per week: Not on file     Minutes per session: Not on file    Stress: Not on file   Relationships    Social connections:     Talks on phone: Not on file     Gets together: Not on file     Attends Hoahaoism service: Not on file     Active member of club or organization: Not on file     Attends meetings of clubs or organizations: Not on file     Relationship status: Not on file    Intimate partner violence:     Fear of current or ex partner: Not on file     Emotionally abused: Not on file     Physically abused: Not on file     Forced sexual activity: Not on file   Other Topics Concern    Not on file   Social History Narrative    In the home with mora and his mother      Family History   Problem Relation Age of Onset    Mental Retardation Mother     Psychiatric Disorder Mother     Diabetes Maternal Grandmother     Heart Disease Maternal Grandmother     Hypertension Maternal Grandmother     Stroke Maternal Grandmother        Current Outpatient Medications:     multivitamin (ONE A DAY) tablet, Take 1 Tab by mouth daily. , Disp: , Rfl:     cyanocobalamin, vitamin B-12, (VITAMIN B12 PO), Take  by mouth., Disp: , Rfl:     ascorbic acid, vitamin C, (VITAMIN C) 500 mg tablet, Take 1,000 mg by mouth., Disp: , Rfl:     albuterol (PROVENTIL HFA, VENTOLIN HFA, PROAIR HFA) 90 mcg/actuation inhaler, Take 1 Puff by inhalation every four (4) hours as needed for Wheezing.  Shake well before each inhalation. , Disp: 2 Inhaler, Rfl: 2    escitalopram oxalate (LEXAPRO) 10 mg tablet, TAKE 1 TABLET BY MOUTH EVERY DAY, Disp: 90 Tab, Rfl: 0    clotrimazole (LOTRIMIN) 1 % topical cream, Apply  to affected area two (2) times a day for 21 days. , Disp: 60 g, Rfl: 0    albuterol (PROVENTIL VENTOLIN) 2.5 mg /3 mL (0.083 %) nebu, Take 3 mL by inhalation every four (4) hours as needed for Cough or Other (SOB). , Disp: 30 Nebule, Rfl: 1    chlorthalidone (HYGROTEN) 50 mg tablet, Take 1 Tab by mouth daily. Indications: high blood pressure, Disp: 90 Tab, Rfl: 0    amLODIPine (NORVASC) 10 mg tablet, Take 1 Tab by mouth daily. Indications: high blood pressure, Disp: 90 Tab, Rfl: 1    metFORMIN ER (GLUCOPHAGE XR) 500 mg tablet, TAKE 1 TABLET BY MOUTH EVERY DAY IN THE EVENING WITH DINNER, Disp: 90 Tab, Rfl: 0    omeprazole (PRILOSEC OTC) 20 mg tablet, Take 20 mg by mouth., Disp: , Rfl:     Desvenlafaxine 100 mg Tb24, TAKE 1 TABLET BY MOUTH EVERY DAY, Disp: , Rfl: 10    ENSKYCE 0.15-0.03 mg tab, TAKE 1 TABLET BY MOUTH ONCE DAILY, Disp: , Rfl: 3  Allergies   Allergen Reactions    Ibuprofen Nausea and Vomiting     PCP Krystle Montes NP    Review of Systems   Constitutional: Positive for malaise/fatigue. HENT: Positive for congestion (seasonal ). Eyes: Positive for blurred vision. Respiratory: Positive for shortness of breath and wheezing (hx asthma; last used nebulizer about a year ago ). Negative for cough, hemoptysis and sputum production. Snores   Sleep study (-) for PRISCILA    Cardiovascular: Positive for chest pain (\"nothing concerning\"), palpitations (hx afib ) and leg swelling. Open heart surgery as child \"leaky valves\"     Gastrointestinal: Positive for heartburn (occasional \"stuck sensation\"; heartburn all the time ). Negative for abdominal pain, blood in stool, constipation, diarrhea, nausea and vomiting.         Prilosec helped in the beginning and now on Nexium with breakthrough reflux   Rolaids don't help and wakes with GERD    Genitourinary: Positive for urgency. Musculoskeletal: Positive for back pain, joint pain (hip and knee pain all the time ) and myalgias. Negative for falls and neck pain. Skin: Positive for itching and rash. Yeast skin folds; axilla   Boils inner thighs   Hx I+D    Neurological: Positive for dizziness (prn BP ) and tingling (hands and feet ). Negative for seizures and headaches. Endo/Heme/Allergies:        PCOS   No menses on OCP    Psychiatric/Behavioral: Positive for depression. The patient is nervous/anxious. Insomnia: trouble falling asleep due to pain and anxiety          Hospitalized as a teen        Physical Exam  Constitutional:       Appearance: She is obese. HENT:      Head: Normocephalic and atraumatic. Neck:      Comments: Large neck     Cardiovascular:      Rate and Rhythm: Normal rate and regular rhythm. Heart sounds: Murmur present. Pulmonary:      Effort: Pulmonary effort is normal.      Breath sounds: Normal breath sounds. No wheezing. Abdominal:      Palpations: Abdomen is soft. Comments: Obese   Grade II-III pannus   Musculoskeletal:         General: Swelling (bilateral LE edema ) present. Comments: Ambulating independently    Skin:     General: Skin is warm and dry. Neurological:      General: No focal deficit present. Mental Status: She is alert and oriented to person, place, and time. Psychiatric:         Mood and Affect: Mood normal.         ASSESSMENT and PLAN    ICD-10-CM ICD-9-CM    1. Morbid obesity with BMI of 70 and over, adult ([facility] E66.01 278.01     Z68.45 V85.45    2. Essential hypertension I10 401.9    3. PCOS (polycystic ovarian syndrome) E28.2 256.4    4. Mixed hyperlipidemia E78.2 272.2    5. Diabetes mellitus type 2 in obese ([facility]CC) E11.69 250.00     E66.9 278.00    6. Chronic pain of both knees M25.561 719.46     M25.562 338.29     G89.29     7.  Sleep disorder G47.9 780.50      [facility] Torie meets criteria established by the NIH. Without weight reduction, co-morbidities will escalate as well as risk of early mortality. Recommendation is patient could be served with surgical weight reduction, the procedure of Malabsorptive gastric bypass for treatment of morbid obesity. I explained to the patient differences between laparoscopic gastric bypass, laparoscopic adjustable gastric banding, and sleeve gastrectomy procedures. Patient has attended one our informational meeting and has seen our educational materials. Patient desires to have surgery with Pastor Nicanor MD.   The procedure of divided gastric bypass with Gianna-en-Y gastrojejunostomy was explained to the patient including the four parts of the operation- 1) loss of appetite, 2) the restrictive phases, 3) the dumping phase, 4) mild malabsorption from the diversion of pancreatic or biliary enzymes. Informed consent was obtained concerning the potential morbidities and mortality of the operation including anastomotic leak, pulmonary embolism, deep vein thrombosis, bleeding, staple- line disruption, stomal stenosis or dilatation, inadequate weight loss, and requirement for life-long vitamin intake. Further information was given concerning a three-day hospitalization with at least one or more nights in a special care unit, protein- enriched liquified diet for two-thee weeks, convalescence for three to six weeks. Information was provided concerning the team approach anesthesia, nursing, and dietary. Pneumatic stockings, Heparin or Lovenox, and wound care management techniques were explained. Abdominal pain, nausea and/or vomiting may occur if eating too fast, too much, or not chewing well enough. With sweets or high fat ingestion, dumping may occur. It was further stressed the approximately three to five percent of patients require endoscopic balloon dilatation of the staple line.  Furthermore, a clear discussion of the imperfections of the operation was discussed including the fact that it not effective in every patient because of patient non-compliance and/or mechanical failure. It was hoped, however, that at approaching a ninety percent level, the patients can achieve a mean of seventy percent loss of excess body weight. This is determined partially by patient compliance and exercise as well as making wise choices for the diet. Recommendations:    _x__ Nutrition Evaluation    _x__ Psychological Evaluation    _x__ Cardiac Evaluation Deysi Aguilar MD)     ___ Pulmonary Evaluation    ___ Sleep Medicine     ___ [facility]     ___ Upper GI    ___ Upper endoscopy     ___ Smoking cessation x 30 days pre surgery     ___ Committee    _x__ 10% weight loss pre surgery       I have reinforced without lifestyle change and behavior modification, Michaela Barbosa would not achieve her  weight loss goals. I reviewed risks and complications associated with each procedure. I discussed a diet high in protein, low-fat, low- sugar, limited carbohydrates, and discontinuing use of carbonated beverages. Also discussed physical activity, stress management, sleep hygiene and life long follow up.    52 minutes spent in face to face with Linwoodanisa Familia > 50% counseling.     CC Louann Michaud, NP

## 2020-02-11 NOTE — PROGRESS NOTES
1. Have you been to the ER, urgent care clinic since your last visit? Hospitalized since your last visit? new patient    2. Have you seen or consulted any other health care providers outside of the [address] [place] [address] since your last visit? Include any pap smears or colon screening. New patient    Dario Hull  Body composition    female  30 y.o. Vitals:    02/11/20 0803   BP: (!) 162/91   Pulse: 94   Resp: 20   Temp: 98.2 °F (36.8 °C)   SpO2: 95%   Weight: (!) 447 lb 8 oz (203 kg)   Height: 5' 7\" (1.702 m)     Body mass index is 70.09 kg/m². [place]- 17.5 inches  Waist-60 inches  Hips-69 inches

## 2020-02-11 NOTE — PATIENT INSTRUCTIONS
Homework:    1. Make an appt with the dietician     2. Weight loss 10% pre-surgery (45 lbs goal)     3. Stay smoke free     4. Clearance from cardiology     5. Psych evaluation           Learning About Bariatric Surgery  What is bariatric surgery? Bariatric surgery is surgery to help you lose weight. This type of surgery is only used for people who are very overweight and have not been able to lose weight with diet and exercise. This surgery makes the stomach smaller. Some types of surgery also change the connection between your stomach and intestines. How is bariatric surgery done? Bariatric surgery may be either \"open\" or \"laparoscopic. \" Open surgery is done through a large cut (incision) in the belly. Laparoscopic surgery is done through several small cuts. The doctor puts a lighted tube, or scope, and other surgical tools through small cuts in your belly. The doctor is able to see your organs with the scope. There are different types of bariatric surgery. Gastric sleeve surgery  The surgery is usually done through several small incisions in the belly. The doctor removes more than half of your stomach. This leaves a thin sleeve, or tube, that is about the size of a banana. Because part of your stomach has been removed, this can't be reversed. Gianna-en-Y gastric bypass surgery  Gianna-en-Y (say \"rene-en-why\") surgery changes the connection between the stomach and the intestines. The doctor separates a section of your stomach from the rest of your stomach. This makes a small pouch. The new pouch will hold the food you eat. The doctor connects the stomach pouch to the middle part of the small intestine. Gastric banding surgery  The surgery is usually done through several small incisions in the belly. The doctor wraps a band around the upper part of the stomach. This creates a small pouch. The small size of the pouch means that you will get full after you eat just a small amount of food.  The doctor can inflate or deflate the band to adjust the size. This lets the doctor adjust how quickly food passes from the new pouch into the stomach. It does not change the connection between the stomach and the intestines. What can you expect after the surgery? You may stay in the hospital for one or more days after the surgery. How long you stay depends on the type of surgery you had. Most people need 2 to 4 weeks before they are ready to get back to their usual routine. For the first 2 to 6 weeks after surgery, you probably will need to follow a liquid or soft diet. Bit by bit, you will be able to eat more solid foods. Your doctor may advise you to work with a dietitian. This way you'll be sure to get enough protein, vitamins, and minerals while you are losing weight. Even with a healthy diet, you may need to take vitamin and mineral supplements. After surgery, you will not be able to eat very much at one time. You will get full quickly. Try not to eat too much at one time or eat foods that are high in fat or sugar. If you do, you may vomit, get stomach pain, or have diarrhea. You probably will lose weight very quickly in the first few months after surgery. As time goes on, your weight loss will slow down. You will have regular doctor visits to check how you are doing. Think of bariatric surgery as a tool to help you lose weight. It isn't an instant fix. You will still need to eat a healthy diet and get regular exercise. This will help you reach your weight goal and avoid regaining the weight you lose. Follow-up care is a key part of your treatment and safety. Be sure to make and go to all appointments, and call your doctor if you are having problems. It's also a good idea to know your test results and keep a list of the medicines you take. Where can you learn more? Go to http://will-rafy.info/. Enter G469 in the search box to learn more about \"Learning About Bariatric Surgery. \"  Current as of: March 28, 2019  Content Version: 12.2  © 5220-9829 [employer]. Care instructions adapted under license by [employer] (which disclaims liability or warranty for this information). If you have questions about a medical condition or this instruction, always ask your healthcare professional. [address] any warranty or liability for your use of this information.

## 2020-02-12 RX ORDER — METFORMIN HYDROCHLORIDE 500 MG/1
1000 TABLET, EXTENDED RELEASE ORAL
Qty: 180 TAB | Refills: 0 | Status: SHIPPED | OUTPATIENT
Start: 2020-02-12 | End: 2020-02-13 | Stop reason: SDUPTHER

## 2020-02-12 RX ORDER — LISINOPRIL 10 MG/1
10 TABLET ORAL DAILY
Qty: 30 TAB | Refills: 1 | Status: SHIPPED | OUTPATIENT
Start: 2020-02-12 | End: 2020-03-10 | Stop reason: DRUGHIGH

## 2020-02-12 NOTE — TELEPHONE ENCOUNTER
From: Anusha Dolan  To: Simone Morales NP  Sent: 2/11/2020 3:09 PM EST  Subject: Prescription Question    I met with Cheryle ([address] [facility]) this morning she said she will be sending you a letter as well. She recommends that my metformin is increased as well as my blood pressure meds. My bp is still remaining shanon high as you can see from my last 2 drAbby Visits.
unknown

## 2020-02-13 ENCOUNTER — TELEPHONE (OUTPATIENT)
Dept: FAMILY MEDICINE CLINIC | Age: 30
End: 2020-02-13

## 2020-02-13 DIAGNOSIS — I10 ESSENTIAL HYPERTENSION: ICD-10-CM

## 2020-02-13 DIAGNOSIS — R73.01 IMPAIRED FASTING BLOOD SUGAR: ICD-10-CM

## 2020-02-13 DIAGNOSIS — E66.01 OBESITY, MORBID (HCC): ICD-10-CM

## 2020-02-13 RX ORDER — METFORMIN HYDROCHLORIDE 500 MG/1
1000 TABLET, EXTENDED RELEASE ORAL
Qty: 180 TAB | Refills: 0 | Status: SHIPPED | OUTPATIENT
Start: 2020-02-13 | End: 2020-03-10 | Stop reason: SDUPTHER

## 2020-02-21 DIAGNOSIS — J45.40 MODERATE PERSISTENT ASTHMA WITHOUT COMPLICATION: ICD-10-CM

## 2020-02-21 RX ORDER — ALBUTEROL SULFATE 0.83 MG/ML
2.5 SOLUTION RESPIRATORY (INHALATION)
Qty: 30 NEBULE | Refills: 1 | Status: SHIPPED | OUTPATIENT
Start: 2020-02-21 | End: 2020-06-09

## 2020-02-21 NOTE — TELEPHONE ENCOUNTER
PCP: Louann Michaud NP    Last appt: 11/1/2019  No future appointments. Requested Prescriptions     Pending Prescriptions Disp Refills    albuterol (PROVENTIL VENTOLIN) 2.5 mg /3 mL (0.083 %) nebu 30 Nebule 1     Sig: Take 3 mL by inhalation every four (4) hours as needed for Cough or Other (SOB).
Pharm is requesting a refill on the following meds. Pharm on file verified. Requested Prescriptions     Pending Prescriptions Disp Refills    albuterol (PROVENTIL VENTOLIN) 2.5 mg /3 mL (0.083 %) nebu 30 Nebule 1     Sig: Take 3 mL by inhalation every four (4) hours as needed for Cough or Other (SOB).
Right arm;

## 2020-03-10 ENCOUNTER — OFFICE VISIT (OUTPATIENT)
Dept: FAMILY MEDICINE CLINIC | Age: 30
End: 2020-03-10

## 2020-03-10 VITALS
WEIGHT: 293 LBS | TEMPERATURE: 98.6 F | SYSTOLIC BLOOD PRESSURE: 148 MMHG | HEART RATE: 94 BPM | RESPIRATION RATE: 24 BRPM | BODY MASS INDEX: 45.99 KG/M2 | HEIGHT: 67 IN | DIASTOLIC BLOOD PRESSURE: 96 MMHG | OXYGEN SATURATION: 95 %

## 2020-03-10 DIAGNOSIS — I10 ESSENTIAL HYPERTENSION: ICD-10-CM

## 2020-03-10 DIAGNOSIS — R73.01 IMPAIRED FASTING BLOOD SUGAR: ICD-10-CM

## 2020-03-10 DIAGNOSIS — G47.9 SLEEP DIFFICULTIES: Primary | ICD-10-CM

## 2020-03-10 DIAGNOSIS — E66.01 OBESITY, MORBID (HCC): ICD-10-CM

## 2020-03-10 DIAGNOSIS — F41.9 ANXIETY AND DEPRESSION: ICD-10-CM

## 2020-03-10 DIAGNOSIS — F32.A ANXIETY AND DEPRESSION: ICD-10-CM

## 2020-03-10 RX ORDER — GLUCOSAMINE SULFATE 1500 MG
POWDER IN PACKET (EA) ORAL DAILY
COMMUNITY
End: 2021-07-28

## 2020-03-10 RX ORDER — CHLORTHALIDONE 50 MG/1
50 TABLET ORAL DAILY
Qty: 90 TAB | Refills: 0 | Status: SHIPPED | OUTPATIENT
Start: 2020-03-10 | End: 2020-07-06 | Stop reason: SDUPTHER

## 2020-03-10 RX ORDER — AMLODIPINE BESYLATE 10 MG/1
10 TABLET ORAL DAILY
Qty: 90 TAB | Refills: 1 | Status: SHIPPED | OUTPATIENT
Start: 2020-03-10 | End: 2020-12-04 | Stop reason: SDUPTHER

## 2020-03-10 RX ORDER — HYDROGEN PEROXIDE 3 %
SOLUTION, NON-ORAL MISCELLANEOUS DAILY
COMMUNITY
End: 2021-02-10

## 2020-03-10 RX ORDER — LISINOPRIL 20 MG/1
20 TABLET ORAL DAILY
Qty: 90 TAB | Refills: 0 | Status: SHIPPED | OUTPATIENT
Start: 2020-03-10 | End: 2020-06-09

## 2020-03-10 RX ORDER — AMOXICILLIN 500 MG/1
CAPSULE ORAL
COMMUNITY
Start: 2020-02-20 | End: 2020-03-10

## 2020-03-10 RX ORDER — METFORMIN HYDROCHLORIDE 500 MG/1
1000 TABLET, EXTENDED RELEASE ORAL
Qty: 180 TAB | Refills: 0 | Status: SHIPPED | OUTPATIENT
Start: 2020-03-10 | End: 2020-08-14 | Stop reason: SDUPTHER

## 2020-03-10 RX ORDER — TRAZODONE HYDROCHLORIDE 50 MG/1
50 TABLET ORAL
Qty: 30 TAB | Refills: 1 | Status: SHIPPED | OUTPATIENT
Start: 2020-03-10 | End: 2020-04-15 | Stop reason: SDUPTHER

## 2020-03-10 NOTE — PROGRESS NOTES
Chief Complaint   Patient presents with    Hypertension     follow up     [address] DOCUMENTATION\"  1. Have you been to the ER, urgent care clinic since your last visit? Hospitalized since your last visit? No    2. Have you seen or consulted any other health care providers outside of the [address] [place] [address] since your last visit? Include any pap smears or colon screening.  No

## 2020-03-10 NOTE — PROGRESS NOTES
[address] [facility] Note  Subjective:      Yanely Vazquez is a 30 y.o. female who presents for an acute visit with the following chief complaints. Chief Complaint   Patient presents with    Hypertension     follow up        Cardiovascular Review:  The patient has hypertension, paroxymal a fib, aortic valve regurgitation, obesity, prediabetes. Cardiologist is Dr. Deangelo Law. Diet and Lifestyle: sedentary, nonsmoker  Medications: Norvasc 10 mg, chlorthalidone 50 mg, lisinopril 10 mg daily, Metformin XR 1,000 mg BID. Home BP Monitoring: Not well controlled at home, 130-140's/90's  Weight is up 5 pounds. Met with bariatric surgeons, undergoing nutrition, psych and cardiac evaluation. Pertinent ROS: taking medications as instructed, no medication side effects noted, no TIA's, no chest pain on exertion, no dyspnea on exertion, no swelling of ankles.     Issues getting to sleep, can stay up for hours. She does work evenings as . Works 3PM-9PM. Gets off at 9 PM. Eats dinner, stays up for 1-2 hours. Gets into bed around 11:30-12PM. Watching TV in living room prior to bed. Wakes up at 9:30 AM. Has tried melatonin with no relief. Reports  Mood is stable, denies significant anxiety or depression on current regimen. Current Outpatient Medications   Medication Sig Dispense Refill    esomeprazole (NEXIUM) 20 mg capsule Take  by mouth daily.  cholecalciferol (VITAMIN D3) 25 mcg (1,000 unit) cap Take  by mouth daily.  traZODone (DESYREL) 50 mg tablet Take 1 Tab by mouth nightly. Indications: insomnia associated with depression 30 Tab 1    lisinopriL (PRINIVIL, ZESTRIL) 20 mg tablet Take 1 Tab by mouth daily. 90 Tab 0    amLODIPine (NORVASC) 10 mg tablet Take 1 Tab by mouth daily. Indications: high blood pressure 90 Tab 1    chlorthalidone (HYGROTEN) 50 mg tablet Take 1 Tab by mouth daily.  Indications: high blood pressure 90 Tab 0    metFORMIN ER (GLUCOPHAGE XR) 500 mg tablet Take 2 Tabs by mouth daily (with dinner). 180 Tab 0    albuterol (PROVENTIL VENTOLIN) 2.5 mg /3 mL (0.083 %) nebu Take 3 mL by inhalation every four (4) hours as needed for Cough or Other (SOB). 30 Nebule 1    multivitamin (ONE A DAY) tablet Take 1 Tab by mouth daily.  cyanocobalamin, vitamin B-12, (VITAMIN B12 PO) Take  by mouth.  ascorbic acid, vitamin C, (VITAMIN C) 500 mg tablet Take 1,000 mg by mouth.  albuterol (PROVENTIL HFA, VENTOLIN HFA, PROAIR HFA) 90 mcg/actuation inhaler Take 1 Puff by inhalation every four (4) hours as needed for Wheezing. Shake well before each inhalation. 2 Inhaler 2    escitalopram oxalate (LEXAPRO) 10 mg tablet TAKE 1 TABLET BY MOUTH EVERY DAY 90 Tab 0    Desvenlafaxine 100 mg Tb24 TAKE 1 TABLET BY MOUTH EVERY DAY  10    ENSKYCE 0.15-0.03 mg tab TAKE 1 TABLET BY MOUTH ONCE DAILY  3     Allergies   Allergen Reactions    Ibuprofen Nausea and Vomiting       ROS:   Complete review of systems was reviewed with pertinent information listed in HPI. Review of Systems   Constitutional: Negative for chills, diaphoresis, fever, malaise/fatigue and weight loss. HENT: Negative for congestion, ear pain, hearing loss, sinus pain, sore throat and tinnitus. Eyes: Negative for blurred vision and double vision. Respiratory: Negative for shortness of breath. Cardiovascular: Negative for chest pain, palpitations and leg swelling. Gastrointestinal: Negative for abdominal pain, blood in stool, constipation, diarrhea, heartburn, melena, nausea and vomiting. Genitourinary: Negative for dysuria, frequency, hematuria and urgency. Musculoskeletal: Negative for joint pain and myalgias. Skin: Negative for itching and rash. Neurological: Negative for dizziness, tingling, weakness and headaches. Endo/Heme/Allergies: Negative for polydipsia. Psychiatric/Behavioral: Negative for depression, hallucinations, memory loss, substance abuse and suicidal ideas.  The patient has insomnia. The patient is not nervous/anxious. Objective:     Visit Vitals  BP (!) 148/96 (BP 1 Location: Right arm, BP Patient Position: Sitting)   Pulse 94   Temp 98.6 °F (37 °C) (Oral)   Resp 24   Ht 5' 7\" (1.702 m)   Wt (!) 452 lb (205 kg)   SpO2 95%   BMI 70.79 kg/m²       Vitals and Nurse Documentation reviewed. Physical Exam  Vitals signs and nursing note reviewed. Constitutional:       General: She is not in acute distress. Appearance: She is well-developed and well-groomed. She is obese. She is not ill-appearing, toxic-appearing or diaphoretic. HENT:      Head: Normocephalic and atraumatic. Mouth/Throat:      Lips: Pink. No lesions. Eyes:      General: Lids are normal.   Neck:      Musculoskeletal: Full passive range of motion without pain. Cardiovascular:      Rate and Rhythm: Normal rate and regular rhythm. Pulses: Normal pulses. Heart sounds: Normal heart sounds, S1 normal and S2 normal. No murmur. No friction rub. No gallop. Pulmonary:      Effort: Pulmonary effort is normal. No respiratory distress. Breath sounds: Normal breath sounds. No decreased breath sounds, wheezing, rhonchi or rales. Chest:      Chest wall: No tenderness. Musculoskeletal:      Right lower leg: No edema. Left lower leg: No edema. Skin:     General: Skin is warm and dry. Capillary Refill: Capillary refill takes less than 2 seconds. Nails: There is no clubbing. Neurological:      Mental Status: She is alert and oriented to person, place, and time. Gait: Gait is intact. Psychiatric:         Attention and Perception: Attention normal.         Mood and Affect: Mood and affect normal.         Speech: Speech normal.         Behavior: Behavior is cooperative. Thought Content:  Thought content normal.         Cognition and Memory: Cognition and memory normal.         Judgment: Judgment normal.         Assessment/Plan:     Diagnoses and all orders for this visit:    1. Sleep difficulties: Longstanding issues getting to sleep. Failed OTC therapies. Will start trazodone given co-morbid anxiety and depression. Discussed in detail sleep hygiene modifications. -     traZODone (DESYREL) 50 mg tablet; Take 1 Tab by mouth nightly. Indications: insomnia associated with depression    2. Anxiety and depression: Stable, add trazodone for sleep as above. Continue remaining therapy. -     traZODone (DESYREL) 50 mg tablet; Take 1 Tab by mouth nightly. Indications: insomnia associated with depression    3. Essential hypertension: Remains above goal in office and at home. Increase lisinopril from 10 to 20 mg daily, continue Norvasc 10 mg daily and chlorthalidone 50 mg daily. BMP today. -     lisinopriL (PRINIVIL, ZESTRIL) 20 mg tablet; Take 1 Tab by mouth daily. -     amLODIPine (NORVASC) 10 mg tablet; Take 1 Tab by mouth daily. Indications: high blood pressure  -     chlorthalidone (HYGROTEN) 50 mg tablet; Take 1 Tab by mouth daily. Indications: high blood pressure  -     METABOLIC PANEL, COMPREHENSIVE    4. Impaired fasting blood sugar: Continue current therapy. Repeat A1c in 3-6 months. -     metFORMIN ER (GLUCOPHAGE XR) 500 mg tablet; Take 2 Tabs by mouth daily (with dinner). 5. Obesity, morbid ([address]): I have reviewed/discussed the above normal BMI with the patient. I have recommended the following interventions: dietary management education, guidance, and counseling, encourage exercise, monitor weight and prescribed dietary intake. Given written instructions as well. - Under bariatric surgery evaluation and work-up. -     metFORMIN ER (GLUCOPHAGE XR) 500 mg tablet; Take 2 Tabs by mouth daily (with dinner). Follow-up and Dispositions    · Return in about 6 weeks (around 4/21/2020) for Follow-up, elevated BP.      Discussed expected course/resolution/complications of diagnosis in detail with patient.    Medication risks/benefits/costs/interactions/alternatives discussed with patient.    Pt was given an after visit summary which includes diagnoses, current medications & vitals.  Pt expressed understanding with the diagnosis and plan

## 2020-03-10 NOTE — PATIENT INSTRUCTIONS
Insomnia: Care Instructions  Your Care Instructions    Insomnia is the inability to sleep well. It is a common problem for most people at some time. Insomnia may make it hard for you to get to sleep, stay asleep, or sleep as long as you need to. This can make you tired and grouchy during the day. It can also make you forgetful, less effective at work, and unhappy. Insomnia can be caused by conditions such as depression or anxiety. Pain can also affect your ability to sleep. When these problems are solved, the insomnia usually clears up. But sometimes bad sleep habits can cause insomnia. If insomnia is affecting your work or your enjoyment of life, you can take steps to improve your sleep. Follow-up care is a key part of your treatment and safety. Be sure to make and go to all appointments, and call your doctor if you are having problems. It's also a good idea to know your test results and keep a list of the medicines you take. How can you care for yourself at home? What to avoid  · Do not have drinks with caffeine, such as coffee or black tea, for 8 hours before bed. · Do not smoke or use other types of tobacco near bedtime. Nicotine is a stimulant and can keep you awake. · Avoid drinking alcohol late in the evening, because it can cause you to wake in the middle of the night. · Do not eat a big meal close to bedtime. If you are hungry, eat a light snack. · Do not drink a lot of water close to bedtime, because the need to urinate may wake you up during the night. · Do not read or watch TV in bed. Use the bed only for sleeping and sexual activity. What to try  · Go to bed at the same time every night, and wake up at the same time every morning. Do not take naps during the day. · Keep your bedroom quiet, dark, and cool. · Sleep on a comfortable pillow and mattress. · If watching the clock makes you anxious, turn it facing away from you so you cannot see the time.   · If you worry when you lie down, start a worry book. Well before bedtime, write down your worries, and then set the book and your concerns aside. · Try meditation or other relaxation techniques before you go to bed. · If you cannot fall asleep, get up and go to another room until you feel sleepy. Do something relaxing. Repeat your bedtime routine before you go to bed again. · Make your house quiet and calm about an hour before bedtime. Turn down the lights, turn off the TV, log off the computer, and turn down the volume on music. This can help you relax after a busy day. When should you call for help? Watch closely for changes in your health, and be sure to contact your doctor if:    · Your efforts to improve your sleep do not work.     · Your insomnia gets worse.     · You have been feeling down, depressed, or hopeless or have lost interest in things that you usually enjoy. Where can you learn more? Go to http://willPoshVinerafy.info/. Enter P513 in the search box to learn more about \"Insomnia: Care Instructions. \"  Current as of: April 7, 2019  Content Version: 12.2  © 1120-4666 [employer]. Care instructions adapted under license by [employer] (which disclaims liability or warranty for this information). If you have questions about a medical condition or this instruction, always ask your healthcare professional. [address] any warranty or liability for your use of this information. Learning About Sleeping Well  What does sleeping well mean? Sleeping well means getting enough sleep. How much sleep is enough varies among people. The number of hours you sleep is not as important as how you feel when you wake up. If you do not feel refreshed, you probably need more sleep. Another sign of not getting enough sleep is feeling tired during the day. The average total nightly sleep time is 7½ to 8 hours.  Healthy adults may need a little more or a little less than this.  Why is getting enough sleep important? Getting enough quality sleep is a basic part of good health. When your sleep suffers, your mood and your thoughts can suffer too. You may find yourself feeling more grumpy or stressed. Not getting enough sleep also can lead to serious problems, including injury, accidents, anxiety, and depression. What might cause poor sleeping? Many things can cause sleep problems, including:  · Stress. Stress can be caused by fear about a single event, such as giving a speech. Or you may have ongoing stress, such as worry about work or school. · Depression, anxiety, and other mental or emotional conditions. · Changes in your sleep habits or surroundings. This includes changes that happen where you sleep, such as noise, light, or sleeping in a different bed. It also includes changes in your sleep pattern, such as having jet lag or working a late shift. · Health problems, such as pain, breathing problems, and restless legs syndrome. · Lack of regular exercise. How can you help yourself? Here are some tips that may help you sleep more soundly and wake up feeling more refreshed. Your sleeping area  · Use your bedroom only for sleeping and sex. A bit of light reading may help you fall asleep. But if it doesn't, do your reading elsewhere in the house. Don't watch TV in bed. · Be sure your bed is big enough to stretch out comfortably, especially if you have a sleep partner. · Keep your bedroom quiet, dark, and cool. Use curtains, blinds, or a sleep mask to block out light. To block out noise, use earplugs, soothing music, or a \"white noise\" machine. Your evening and bedtime routine  · Create a relaxing bedtime routine. You might want to take a warm shower or bath, listen to soothing music, or drink a cup of noncaffeinated tea. · Go to bed at the same time every night. And get up at the same time every morning, even if you feel tired.   What to avoid  · Limit caffeine (coffee, tea, caffeinated sodas) during the day, and don't have any for at least 4 to 6 hours before bedtime. · Don't drink alcohol before bedtime. Alcohol can cause you to wake up more often during the night. · Don't smoke or use tobacco, especially in the evening. Nicotine can keep you awake. · Don't take naps during the day, especially close to bedtime. · Don't lie in bed awake for too long. If you can't fall asleep, or if you wake up in the middle of the night and can't get back to sleep within 15 minutes or so, get out of bed and go to another room until you feel sleepy. · Don't take medicine right before bed that may keep you awake or make you feel hyper or energized. Your doctor can tell you if your medicine may do this and if you can take it earlier in the day. If you can't sleep  · Imagine yourself in a peaceful, pleasant scene. Focus on the details and feelings of being in a place that is relaxing. · Get up and do a quiet or boring activity until you feel sleepy. · Don't drink any liquids after 6 p.m. if you wake up often because you have to go to the bathroom. Where can you learn more? Go to http://will-rafy.info/. Enter Q246 in the search box to learn more about \"Learning About Sleeping Well. \"  Current as of: May 28, 2019  Content Version: 12.2  © 7764-0677 [employer]. Care instructions adapted under license by [employer] (which disclaims liability or warranty for this information). If you have questions about a medical condition or this instruction, always ask your healthcare professional. [place] [address] any warranty or liability for your use of this information.

## 2020-03-11 LAB
ALBUMIN SERPL-MCNC: 3.9 G/DL (ref 3.9–5)
ALBUMIN/GLOB SERPL: 1.1 {RATIO} (ref 1.2–2.2)
ALP SERPL-CCNC: 88 IU/L (ref 39–117)
ALT SERPL-CCNC: 35 IU/L (ref 0–32)
AST SERPL-CCNC: 30 IU/L (ref 0–40)
BILIRUB SERPL-MCNC: 0.2 MG/DL (ref 0–1.2)
BUN SERPL-MCNC: 10 MG/DL (ref 6–20)
BUN/CREAT SERPL: 15 (ref 9–23)
CALCIUM SERPL-MCNC: 9.3 MG/DL (ref 8.7–10.2)
CHLORIDE SERPL-SCNC: 97 MMOL/L (ref 96–106)
CO2 SERPL-SCNC: 22 MMOL/L (ref 20–29)
CREAT SERPL-MCNC: 0.66 MG/DL (ref 0.57–1)
GLOBULIN SER CALC-MCNC: 3.4 G/DL (ref 1.5–4.5)
GLUCOSE SERPL-MCNC: 95 MG/DL (ref 65–99)
POTASSIUM SERPL-SCNC: 3.4 MMOL/L (ref 3.5–5.2)
PROT SERPL-MCNC: 7.3 G/DL (ref 6–8.5)
SODIUM SERPL-SCNC: 139 MMOL/L (ref 134–144)

## 2020-03-17 NOTE — PROGRESS NOTES
Abdiel Gonzalez,     Your Potassium was 3.4, goal is 3.5 to 5.2. We need to watch this closely while taking your chlorthalidone. Please focus on eating healthy foods that are high in potassium such as colorful veggies especially dark green veggies, bananas, potatoes. Other foods include nuts, meat and poultry, brown and wild rice, whole-wheat breads and pastas. One of your liver enzymes was mildly elevated. We will repeat this lab at your follow-up visit. Please let me know if you have any additional questions.

## 2020-04-10 RX ORDER — ESCITALOPRAM OXALATE 10 MG/1
10 TABLET ORAL DAILY
Qty: 90 TAB | Refills: 0 | Status: SHIPPED | OUTPATIENT
Start: 2020-04-10 | End: 2020-07-30 | Stop reason: SDUPTHER

## 2020-04-13 ENCOUNTER — E-VISIT (OUTPATIENT)
Dept: FAMILY MEDICINE CLINIC | Age: 30
End: 2020-04-13

## 2020-04-13 DIAGNOSIS — F32.A ANXIETY AND DEPRESSION: ICD-10-CM

## 2020-04-13 DIAGNOSIS — G47.9 SLEEP DIFFICULTIES: Primary | ICD-10-CM

## 2020-04-13 DIAGNOSIS — F41.9 ANXIETY AND DEPRESSION: ICD-10-CM

## 2020-04-15 RX ORDER — TRAZODONE HYDROCHLORIDE 50 MG/1
50 TABLET ORAL
Qty: 90 TAB | Refills: 0 | Status: SHIPPED | OUTPATIENT
Start: 2020-04-15 | End: 2020-07-18 | Stop reason: SDUPTHER

## 2020-04-15 NOTE — TELEPHONE ENCOUNTER
Longstanding history of anxiety and depression that was well controlled. Insomnia has improved on trazodone 50 mg daily. She denies side effects. Patient has lost insurance coverage with [employer] pandemic. 9o day supply provided. Plan to follow-up in 3 months.

## 2020-06-05 DIAGNOSIS — I10 ESSENTIAL HYPERTENSION: ICD-10-CM

## 2020-06-06 DIAGNOSIS — J45.40 MODERATE PERSISTENT ASTHMA WITHOUT COMPLICATION: ICD-10-CM

## 2020-06-06 DIAGNOSIS — I10 ESSENTIAL HYPERTENSION: ICD-10-CM

## 2020-06-06 RX ORDER — LISINOPRIL 20 MG/1
20 TABLET ORAL DAILY
Qty: 90 TAB | Refills: 0 | Status: CANCELLED | OUTPATIENT
Start: 2020-06-06

## 2020-06-06 RX ORDER — ALBUTEROL SULFATE 0.83 MG/ML
2.5 SOLUTION RESPIRATORY (INHALATION)
Qty: 30 NEBULE | Refills: 1 | Status: CANCELLED | OUTPATIENT
Start: 2020-06-06

## 2020-06-08 DIAGNOSIS — I10 ESSENTIAL HYPERTENSION: ICD-10-CM

## 2020-06-08 DIAGNOSIS — J45.40 MODERATE PERSISTENT ASTHMA WITHOUT COMPLICATION: ICD-10-CM

## 2020-06-08 NOTE — TELEPHONE ENCOUNTER
Last visit 03/10/2020 NP Katelynn De León   Next appointment 06/19/2020 NP Angela   Previous refill encounter(s) 03/10/2020 Prinivil #90, 02/21/2020 Albulterol Nebulizer Solution #30 with 1 refill. Requested Prescriptions     Pending Prescriptions Disp Refills    albuterol (PROVENTIL VENTOLIN) 2.5 mg /3 mL (0.083 %) nebu 30 Nebule 0     Sig: Take 3 mL by inhalation every four (4) hours as needed for Cough or Other (SOB).  lisinopriL (PRINIVIL, ZESTRIL) 20 mg tablet 90 Tab 0     Sig: Take 1 Tab by mouth daily.

## 2020-06-09 RX ORDER — LISINOPRIL 20 MG/1
TABLET ORAL
Qty: 90 TAB | Refills: 0 | Status: SHIPPED | OUTPATIENT
Start: 2020-06-09 | End: 2020-09-02

## 2020-06-09 RX ORDER — ALBUTEROL SULFATE 0.83 MG/ML
SOLUTION RESPIRATORY (INHALATION)
Qty: 75 ML | Refills: 1 | Status: SHIPPED | OUTPATIENT
Start: 2020-06-09 | End: 2020-08-14 | Stop reason: SDUPTHER

## 2020-06-10 RX ORDER — ALBUTEROL SULFATE 0.83 MG/ML
2.5 SOLUTION RESPIRATORY (INHALATION)
Qty: 30 NEBULE | Refills: 0 | OUTPATIENT
Start: 2020-06-10

## 2020-06-10 RX ORDER — LISINOPRIL 20 MG/1
20 TABLET ORAL DAILY
Qty: 90 TAB | Refills: 0 | OUTPATIENT
Start: 2020-06-10

## 2020-06-10 NOTE — TELEPHONE ENCOUNTER
Medication refills have been completed on 6/9/2020 as ordered by Manny Temple NP.   Brenda Gonzalez LPN

## 2020-07-13 ENCOUNTER — OFFICE VISIT (OUTPATIENT)
Dept: FAMILY MEDICINE CLINIC | Age: 30
End: 2020-07-13

## 2020-07-13 VITALS
SYSTOLIC BLOOD PRESSURE: 144 MMHG | RESPIRATION RATE: 22 BRPM | TEMPERATURE: 98.2 F | HEIGHT: 67 IN | HEART RATE: 100 BPM | WEIGHT: 293 LBS | OXYGEN SATURATION: 95 % | BODY MASS INDEX: 45.99 KG/M2 | DIASTOLIC BLOOD PRESSURE: 81 MMHG

## 2020-07-13 DIAGNOSIS — E87.6 HYPOKALEMIA: ICD-10-CM

## 2020-07-13 DIAGNOSIS — I10 ESSENTIAL HYPERTENSION: Primary | ICD-10-CM

## 2020-07-13 DIAGNOSIS — F41.9 ANXIETY: ICD-10-CM

## 2020-07-13 DIAGNOSIS — R79.89 ELEVATED LFTS: ICD-10-CM

## 2020-07-13 DIAGNOSIS — R73.9 ELEVATED BLOOD SUGAR LEVEL: ICD-10-CM

## 2020-07-13 DIAGNOSIS — F32.A DEPRESSION, UNSPECIFIED DEPRESSION TYPE: ICD-10-CM

## 2020-07-13 RX ORDER — HYDROXYZINE PAMOATE 25 MG/1
25 CAPSULE ORAL
Qty: 14 CAP | Refills: 0 | Status: SHIPPED | OUTPATIENT
Start: 2020-07-13 | End: 2020-07-27

## 2020-07-13 RX ORDER — CHLORTHALIDONE 50 MG/1
50 TABLET ORAL DAILY
Qty: 30 TAB | Refills: 0 | Status: SHIPPED | OUTPATIENT
Start: 2020-07-13 | End: 2020-08-05

## 2020-07-13 NOTE — PROGRESS NOTES
[facility] Note      Subjective:     Chief Complaint   Patient presents with    Hypertension     Christophe Parker is a 35y.o. year old female who presents for evaluation of the following:      Hypertension:  Chronic. Home monitoring: None  Treatment:   Key CAD CHF Meds             lisinopriL (PRINIVIL, ZESTRIL) 20 mg tablet (Taking) TAKE 1 TABLET BY MOUTH EVERY DAY    amLODIPine (NORVASC) 10 mg tablet (Taking) Take 1 Tab by mouth daily. Indications: high blood pressure    chlorthalidone (HYGROTEN) 50 mg tablet Take 1 Tab by mouth daily. Indications: high blood pressure      Not adhering to strict low salt diet  - no table salt. Complications: None    Co-morbidities: Obesity  BP Readings from Last 3 Encounters:   07/13/20 144/81   03/10/20 (!) 148/96   02/11/20 (!) 162/91       Hypokalemia:   Taking thiazide diuretic  Lab Results   Component Value Date/Time    Potassium 3.4 (L) 03/10/2020 12:29 PM       Elevated Liver Enzymes:   Tx: None  Lab Results   Component Value Date/Time    ALT (SGPT) 35 (H) 03/10/2020 12:29 PM    AST (SGOT) 30 03/10/2020 12:29 PM    Alk.  phosphatase 88 03/10/2020 12:29 PM    Bilirubin, total 0.2 03/10/2020 12:29 PM       Anxiety:   Sister shot boyfriend  Therapy: None  Tx: Lexapro, desvenlafaxine  BILL: 18  3 most recent PHQ Screens 7/13/2020   Little interest or pleasure in doing things Several days   Feeling down, depressed, irritable, or hopeless Not at all   Total Score PHQ 2 1   Trouble falling or staying asleep, or sleeping too much More than half the days   Feeling tired or having little energy Nearly every day   Poor appetite, weight loss, or overeating More than half the days   Feeling bad about yourself - or that you are a failure or have let yourself or your family down Not at all   Trouble concentrating on things such as school, work, reading, or watching TV Nearly every day   Moving or speaking so slowly that other people could have noticed; or the opposite being so fidgety that others notice Nearly every day   Thoughts of being better off dead, or hurting yourself in some way Not at all   PHQ 9 Score 14         Review of Systems   Pertinent positives and negative per HPI. All other systems  reviewed are negative for a Comprehensive ROS (10+).        Past Medical History:   Diagnosis Date    Anxiety and depression     anxiety    Asthma     Atrial fibrillation (HCC)     Chondromalacia of patella, left     Enlarged heart     Heart murmur     Hypertension     Knee pain, bilateral     Mixed hyperlipidemia     Morbid obesity (HCC)     PCOS (polycystic ovarian syndrome)     Pre-diabetes         Social History     Socioeconomic History    Marital status:      Spouse name: Not on file    Number of children: Not on file    Years of education: Not on file    Highest education level: Not on file   Occupational History    Occupation: security     Comment: 3p-midnight    Social Needs    Financial resource strain: Not on file    Food insecurity     Worry: Not on file     Inability: Not on file    Transportation needs     Medical: Not on file     Non-medical: Not on file   Tobacco Use    Smoking status: Former Smoker     Types: Cigarettes     Last attempt to quit: 2018     Years since quittin.5    Smokeless tobacco: Never Used   Substance and Sexual Activity    Alcohol use: No    Drug use: No    Sexual activity: Yes     Partners: Male     Birth control/protection: Pill   Lifestyle    Physical activity     Days per week: Not on file     Minutes per session: Not on file    Stress: Not on file   Relationships    Social connections     Talks on phone: Not on file     Gets together: Not on file     Attends [employer]ism service: Not on file     Active member of club or organization: Not on file     Attends meetings of clubs or organizations: Not on file     Relationship status: Not on file    Intimate partner violence     Fear of current or ex partner: Not on file     Emotionally abused: Not on file     Physically abused: Not on file     Forced sexual activity: Not on file   Other Topics Concern    Not on file   Social History Narrative    In the home with mora and his mother        Family History   Problem Relation Age of Onset    Mental Retardation Mother     Psychiatric Disorder Mother     Diabetes Maternal Grandmother     Heart Disease Maternal Grandmother     Hypertension Maternal Grandmother     Stroke Maternal Grandmother        Current Outpatient Medications   Medication Sig    lisinopriL (PRINIVIL, ZESTRIL) 20 mg tablet TAKE 1 TABLET BY MOUTH EVERY DAY    albuterol (PROVENTIL VENTOLIN) 2.5 mg /3 mL (0.083 %) nebu TAKE 3 ML BY INHALATION EVERY FOUR (4) HOURS AS NEEDED FOR COUGH OR SHORTNESS OF BREATH    traZODone (DESYREL) 50 mg tablet Take 1 Tab by mouth nightly. Indications: insomnia associated with depression    escitalopram oxalate (LEXAPRO) 10 mg tablet Take 1 Tab by mouth daily.  esomeprazole (NEXIUM) 20 mg capsule Take  by mouth daily.  cholecalciferol (VITAMIN D3) 25 mcg (1,000 unit) cap Take  by mouth daily.  amLODIPine (NORVASC) 10 mg tablet Take 1 Tab by mouth daily. Indications: high blood pressure    metFORMIN ER (GLUCOPHAGE XR) 500 mg tablet Take 2 Tabs by mouth daily (with dinner).  multivitamin (ONE A DAY) tablet Take 1 Tab by mouth daily.  ascorbic acid, vitamin C, (VITAMIN C) 500 mg tablet Take 1,000 mg by mouth.  albuterol (PROVENTIL HFA, VENTOLIN HFA, PROAIR HFA) 90 mcg/actuation inhaler Take 1 Puff by inhalation every four (4) hours as needed for Wheezing. Shake well before each inhalation.  Desvenlafaxine 100 mg Tb24 TAKE 1 TABLET BY MOUTH EVERY DAY    ENSKYCE 0.15-0.03 mg tab TAKE 1 TABLET BY MOUTH ONCE DAILY    chlorthalidone (HYGROTEN) 50 mg tablet Take 1 Tab by mouth daily.  Indications: high blood pressure    cyanocobalamin, vitamin B-12, (VITAMIN B12 PO) Take  by mouth.     No current facility-administered medications for this visit. Objective:     Vitals:    07/13/20 1551   BP: 144/81   Pulse: 100   Resp: 22   Temp: 98.2 °F (36.8 °C)   TempSrc: Oral   SpO2: 95%   Weight: (!) 454 lb (205.9 kg)   Height: 5' 7\" (1.702 m)       Physical Examination:  General: Alert, cooperative, no distress, appears stated age. Obese  Eyes: Conjunctivae clear. Pupils equally round and reactive to light, Extraocular muscles intact. Ears: Normal external ear canals both ears. Nose: Nares normal. Septum midline. Mucosa normal. No drainage or sinus tenderness. Mouth/Throat: Lips, mucosa, and tongue normal. No oropharyngeal erythema. No tonsillar enlargement or exudate. Neck: Supple, symmetrical, trachea midline, no adenopathy. No thyroid enlargement/tenderness/nodules  Respiratory: Breathing comfortably, in no acute respiratory distress. Clear to auscultation bilaterally. Normal inspiratory and expiratory ratio. Cardiovascular: Regular rate and rhythm, S1, S2 normal, no murmur, click, rub or gallop.   -Extremities no edema. Pulses 2+ and symmetric radial and dorsalis pedis   Abdomen: Soft, non-tender, not distended. Bowel sounds normal. No masses or organomegaly. MSK: Extremities normal appearing, atraumatic, no effusion. Gait steady and unassisted. Back symmetric, no curvature. Range of motion normal. No Costovertebral angle tenderness. Skin: Skin color, texture, turgor normal. No rashes or lesions on exposed skin. Lymph nodes: Cervical, supraclavicular nodes normal.  Neurologic: Cranial nerves II-XII intact. Strength 5/5 grossly. Sensation and reflexes normal throughout. Psychiatric: Affect appropriate. Mood euthymic. Thoughts logical. Speech volume and speed normal      No visits with results within 3 Month(s) from this visit.    Latest known visit with results is:   Office Visit on 03/10/2020   Component Date Value Ref Range Status    Glucose 03/10/2020 95  65 - 99 mg/dL Final    BUN 03/10/2020 10  6 - 20 mg/dL Final    Creatinine 03/10/2020 0.66  0.57 - 1.00 mg/dL Final    GFR est non-AA 03/10/2020 121  >59 mL/min/1.73 Final    GFR est AA 03/10/2020 139  >59 mL/min/1.73 Final    BUN/Creatinine ratio 03/10/2020 15  9 - 23 Final    Sodium 03/10/2020 139  134 - 144 mmol/L Final    Potassium 03/10/2020 3.4* 3.5 - 5.2 mmol/L Final    Chloride 03/10/2020 97  96 - 106 mmol/L Final    CO2 03/10/2020 22  20 - 29 mmol/L Final    Calcium 03/10/2020 9.3  8.7 - 10.2 mg/dL Final    Protein, total 03/10/2020 7.3  6.0 - 8.5 g/dL Final    Albumin 03/10/2020 3.9  3.9 - 5.0 g/dL Final    GLOBULIN, TOTAL 03/10/2020 3.4  1.5 - 4.5 g/dL Final    A-G Ratio 03/10/2020 1.1* 1.2 - 2.2 Final    Bilirubin, total 03/10/2020 0.2  0.0 - 1.2 mg/dL Final    Alk. phosphatase 03/10/2020 88  39 - 117 IU/L Final    AST (SGOT) 03/10/2020 30  0 - 40 IU/L Final    ALT (SGPT) 03/10/2020 35* 0 - 32 IU/L Final           Assessment/ Plan:   Diagnoses and all orders for this visit:    1. Essential hypertension  -     chlorthalidone (HYGROTEN) 50 mg tablet; Take 1 Tab by mouth daily. Indications: high blood pressure  -     METABOLIC PANEL, COMPREHENSIVE    2. Anxiety  -     hydrOXYzine pamoate (VISTARIL) 25 mg capsule; Take 1 Cap by mouth daily as needed for Anxiety for up to 14 days.  -     METABOLIC PANEL, COMPREHENSIVE    3. Depression, unspecified depression type  -     METABOLIC PANEL, COMPREHENSIVE    4. Hypokalemia  -     METABOLIC PANEL, COMPREHENSIVE      For today's visit, I did the following:  · Reviewed PMH as listed in orders  · Refilled meds for chronic conditions, per orders  · Reviewed labs in detail or ordered lab  Labs to eval end organ function and etiology of chronic/acute concerns. Blood pessure is elevated off chlorthalidoine. Continue current regimen- provided short supply until CMP completed. DASH Diet recommended. Recheck in 2 weeks. Hypokalemia likely diuretic induced.  If remains elevated   Depression with anxiety uncontrolled. Try hydroxyzine for severe anxiety. Follow up with provider prescribing for mood disorder. On the basis of positive PHQ-9 screening (PHQ 9 Score: 14), patient instructed to schedule follow-up visit at this practice and medication was prescribed, drug therapy education given. Patient will follow-up in 4 week. Encouraged counseling for mood disorder. Follow up with specialists per routine      Educated patient on red flag symptoms to warrant return to clinic or emergency room visit. I have discussed the diagnosis with the patient and the intended plan as seen in the above orders. The patient has been offered or received an after-visit summary and questions were answered concerning future plans. I have discussed medication side effects and warnings with the patient as well. Follow-up and Dispositions    · Return in about 4 weeks (around 8/10/2020) for Follow Up.          Signed,    Howard Hernandez MD  7/13/2020

## 2020-07-13 NOTE — PROGRESS NOTES
Chief Complaint   Patient presents with    Hypertension       1. Have you been to the ER, urgent care clinic since your last visit? Hospitalized since your last visit? no    2. Have you seen or consulted any other health care providers outside of the [address] [place] [address] since your last visit? Include any pap smears or colon screening.   no

## 2020-07-13 NOTE — PATIENT INSTRUCTIONS
Follow up with a behavioral therapist for evaluation and management of your mood. If you do not already see a therapist, you can find a list through [employer] by calling the mental help line number on the back of your insurance card. Here are a few local providers:  [facility] [email or web address]  [phone] (can prescribe medications)  [employer] [phone] (can prescribe medications)  [address] [place] [address] [phone] (can prescribe medications)  [employer]       Learning About Mindfulness for Stress  What are mindfulness and stress? Stress is what you feel when you have to handle more than you are used to. A lot of things can cause stress. You may feel stress when you go on a job interview, take a test, or run a race. This kind of short-term stress is normal and even useful. It can help you if you need to work hard or react quickly. Stress also can last a long time. Long-term stress is caused by stressful situations or events. Examples of long-term stress include long-term health problems, ongoing problems at work, and conflicts in your family. Long-term stress can harm your health. Mindfulness is a focus only on things happening in the present moment. It's a process of purposefully paying attention to and being aware of your surroundings, your emotions, your thoughts, and how your body feels. You are aware of these things, but you aren't judging these experiences as \"good\" or \"bad. \" Mindfulness can help you learn to calm your mind and body to help you cope with illness, pain, and stress. How does mindfulness help to relieve stress? Mindfulness can help quiet your mind and relax your body. Studies show that it can help some people sleep better, feel less anxious, and bring their blood pressure down.  And it's been shown to help some people live and cope better with certain health problems like heart disease, depression, chronic pain, and cancer. How do you practice mindfulness? To be mindful is to pay attention, to be present, and to be accepting. · When you're mindful, you do just one thing and you pay close attention to that one thing. For example, you may sit quietly and notice your emotions or how your food tastes and smells. · When you're present, you focus on the things that are happening right now. You let go of your thoughts about the past and the future. When you dwell on the past or the future, you miss moments that can heal and strengthen you. You may miss moments like hearing a child laugh or seeing a friendly face when you think you're all alone. · When you're accepting, you don't  the present moment. Instead you accept your thoughts and feelings as they come. You can practice anytime, anywhere, and in any way you choose. You can practice in many ways. Here are a few ideas:  · While doing your chores, like washing the dishes, let your mind focus on what's in your hand. What does the dish feel like? Is the water warm or cold? · Go outside and take a few deep breaths. What is the air like? Is it warm or cold? · When you can, take some time at the start of your day to sit alone and think. · Take a slow walk by yourself. Count your steps while you breathe in and out. · Try yoga breathing exercises, stretches, and poses to strengthen and relax your muscles. · At work, if you can, try to stop for a few moments each hour. Note how your body feels. Let yourself regroup and let your mind settle before you return to what you were doing. · If you struggle with anxiety or \"worry thoughts,\" imagine your mind as a blue savana and your worry thoughts as clouds. Now imagine those worry thoughts floating across your mind's savana. Just let them pass by as you watch. Follow-up care is a key part of your treatment and safety. Be sure to make and go to all appointments, and call your doctor if you are having problems.  It's also a good idea to know your test results and keep a list of the medicines you take. Where can you learn more? Go to http://www.gray.com/  Enter M676 in the search box to learn more about \"Learning About Mindfulness for Stress. \"  Current as of: December 16, 2019               Content Version: 12.5  © 4826-0468 [employer]. Care instructions adapted under license by [employer] (which disclaims liability or warranty for this information). If you have questions about a medical condition or this instruction, always ask your healthcare professional. [address] any warranty or liability for your use of this information. DASH Diet: Care Instructions  Your Care Instructions     The DASH diet is an eating plan that can help lower your blood pressure. DASH stands for Dietary Approaches to Stop Hypertension. Hypertension is high blood pressure. The DASH diet focuses on eating foods that are high in calcium, potassium, and magnesium. These nutrients can lower blood pressure. The foods that are highest in these nutrients are fruits, vegetables, low-fat dairy products, nuts, seeds, and legumes. But taking calcium, potassium, and magnesium supplements instead of eating foods that are high in those nutrients does not have the same effect. The DASH diet also includes whole grains, fish, and poultry. The DASH diet is one of several lifestyle changes your doctor may recommend to lower your high blood pressure. Your doctor may also want you to decrease the amount of sodium in your diet. Lowering sodium while following the DASH diet can lower blood pressure even further than just the DASH diet alone. Follow-up care is a key part of your treatment and safety. Be sure to make and go to all appointments, and call your doctor if you are having problems. It's also a good idea to know your test results and keep a list of the medicines you take.   How can you care for yourself at home? Following the DASH diet  · Eat 4 to 5 servings of fruit each day. A serving is 1 medium-sized piece of fruit, ½ cup chopped or canned fruit, 1/4 cup dried fruit, or 4 ounces (½ cup) of fruit juice. Choose fruit more often than fruit juice. · Eat 4 to 5 servings of vegetables each day. A serving is 1 cup of lettuce or raw leafy vegetables, ½ cup of chopped or cooked vegetables, or 4 ounces (½ cup) of vegetable juice. Choose vegetables more often than vegetable juice. · Get 2 to 3 servings of low-fat and fat-free dairy each day. A serving is 8 ounces of milk, 1 cup of yogurt, or 1 ½ ounces of cheese. · Eat 6 to 8 servings of grains each day. A serving is 1 slice of bread, 1 ounce of dry cereal, or ½ cup of cooked rice, pasta, or cooked cereal. Try to choose whole-grain products as much as possible. · Limit lean meat, poultry, and fish to 2 servings each day. A serving is 3 ounces, about the size of a deck of cards. · Eat 4 to 5 servings of nuts, seeds, and legumes (cooked dried beans, lentils, and split peas) each week. A serving is 1/3 cup of nuts, 2 tablespoons of seeds, or ½ cup of cooked beans or peas. · Limit fats and oils to 2 to 3 servings each day. A serving is 1 teaspoon of vegetable oil or 2 tablespoons of salad dressing. · Limit sweets and added sugars to 5 servings or less a week. A serving is 1 tablespoon jelly or jam, ½ cup sorbet, or 1 cup of lemonade. · Eat less than 2,300 milligrams (mg) of sodium a day. If you limit your sodium to 1,500 mg a day, you can lower your blood pressure even more. Tips for success  · Start small. Do not try to make dramatic changes to your diet all at once. You might feel that you are missing out on your favorite foods and then be more likely to not follow the plan. Make small changes, and stick with them. Once those changes become habit, add a few more changes. · Try some of the following:  ?  Make it a goal to eat a fruit or vegetable at every meal and at snacks. This will make it easy to get the recommended amount of fruits and vegetables each day. ? Try yogurt topped with fruit and nuts for a snack or healthy dessert. ? Add lettuce, tomato, cucumber, and onion to sandwiches. ? Combine a ready-made pizza crust with low-fat mozzarella cheese and lots of vegetable toppings. Try using tomatoes, squash, spinach, broccoli, carrots, cauliflower, and onions. ? Have a variety of cut-up vegetables with a low-fat dip as an appetizer instead of chips and dip. ? Sprinkle sunflower seeds or chopped almonds over salads. Or try adding chopped walnuts or almonds to cooked vegetables. ? Try some vegetarian meals using beans and peas. Add garbanzo or kidney beans to salads. Make burritos and tacos with mashed gonzalez beans or black beans. Where can you learn more? Go to http://www.watts.com/  Enter H967 in the search box to learn more about \"DASH Diet: Care Instructions. \"  Current as of: December 16, 2019               Content Version: 12.5  © 5427-8473 [employer]. Care instructions adapted under license by [employer] (which disclaims liability or warranty for this information). If you have questions about a medical condition or this instruction, always ask your healthcare professional. [facility] [address] any warranty or liability for your use of this information.

## 2020-07-18 DIAGNOSIS — F32.A ANXIETY AND DEPRESSION: ICD-10-CM

## 2020-07-18 DIAGNOSIS — F41.9 ANXIETY AND DEPRESSION: ICD-10-CM

## 2020-07-18 DIAGNOSIS — G47.9 SLEEP DIFFICULTIES: ICD-10-CM

## 2020-07-20 NOTE — TELEPHONE ENCOUNTER
Last visit 07/13/2020 MD Sheila Dowling   Next appointment 4 weeks (8/2020)   Previous refill encounter(s)   04/15/2020 Desyrel #90     Requested Prescriptions     Pending Prescriptions Disp Refills    traZODone (DESYREL) 50 mg tablet 90 Tab 0     Sig: Take 1 Tab by mouth nightly.  Indications: insomnia associated with depression

## 2020-07-21 RX ORDER — TRAZODONE HYDROCHLORIDE 50 MG/1
50 TABLET ORAL
Qty: 90 TAB | Refills: 0 | Status: SHIPPED | OUTPATIENT
Start: 2020-07-21 | End: 2020-10-06 | Stop reason: SDUPTHER

## 2020-07-31 LAB
ALBUMIN SERPL-MCNC: 4.1 G/DL (ref 3.9–5)
ALBUMIN/GLOB SERPL: 1.2 {RATIO} (ref 1.2–2.2)
ALP SERPL-CCNC: 90 IU/L (ref 39–117)
ALT SERPL-CCNC: 113 IU/L (ref 0–32)
AST SERPL-CCNC: 73 IU/L (ref 0–40)
BILIRUB SERPL-MCNC: <0.2 MG/DL (ref 0–1.2)
BUN SERPL-MCNC: 10 MG/DL (ref 6–20)
BUN/CREAT SERPL: 18 (ref 9–23)
CALCIUM SERPL-MCNC: 9.4 MG/DL (ref 8.7–10.2)
CHLORIDE SERPL-SCNC: 96 MMOL/L (ref 96–106)
CO2 SERPL-SCNC: 21 MMOL/L (ref 20–29)
CREAT SERPL-MCNC: 0.57 MG/DL (ref 0.57–1)
GLOBULIN SER CALC-MCNC: 3.5 G/DL (ref 1.5–4.5)
GLUCOSE SERPL-MCNC: 141 MG/DL (ref 65–99)
POTASSIUM SERPL-SCNC: 3.8 MMOL/L (ref 3.5–5.2)
PROT SERPL-MCNC: 7.6 G/DL (ref 6–8.5)
SODIUM SERPL-SCNC: 137 MMOL/L (ref 134–144)

## 2020-07-31 RX ORDER — ESCITALOPRAM OXALATE 10 MG/1
10 TABLET ORAL DAILY
Qty: 90 TAB | Refills: 0 | Status: SHIPPED | OUTPATIENT
Start: 2020-07-31 | End: 2020-10-06 | Stop reason: DRUGHIGH

## 2020-07-31 NOTE — TELEPHONE ENCOUNTER
Last visit 07/13/2020 MD Yen Garcia   Next appointment 4 weeks (08/2020) Nothing scheduled   Previous refill encounter(s) 04/10/2020 Lexapro #90     Requested Prescriptions     Pending Prescriptions Disp Refills    escitalopram oxalate (LEXAPRO) 10 mg tablet 90 Tab 1     Sig: Take 1 Tab by mouth daily.

## 2020-08-03 NOTE — PROGRESS NOTES
Notify Patient and call to add on lab: Your potasium is improved Your blood sugar is high, suggesting prediabetes. Your liver enzyme have worsened. I will add on labs to evaluate cause of your abnormal labs. I will also order an ultrasound of the liver to evaluate these abnormal liver enzymes. I suspect the cause if fat deposits in the liver which is common in people with obesity and can be improved with weight loss.

## 2020-08-03 NOTE — PROGRESS NOTES
Outbound call to patient no answer unable to leave a message mailbox was full. Sent a [employer] message with results and provider recommendation.

## 2020-08-04 DIAGNOSIS — I10 ESSENTIAL HYPERTENSION: ICD-10-CM

## 2020-08-04 NOTE — TELEPHONE ENCOUNTER
----- Message from Nishi Kimball sent at 8/3/2020 12:25 PM EDT -----  Regarding: Dr. Nicolas Linda  Patient return call    Caller's first and last name and relationship (if not the patient):      Best contact number(s): [phone]      Whose call is being returned: Returning the call back from nurse       Details to clarify the request:      Nishi Kimball         Pt returning Dishadelaney's call for her lab results

## 2020-08-05 RX ORDER — CHLORTHALIDONE 50 MG/1
TABLET ORAL
Qty: 30 TAB | Refills: 0 | Status: SHIPPED | OUTPATIENT
Start: 2020-08-05 | End: 2020-09-10 | Stop reason: SDUPTHER

## 2020-08-11 ENCOUNTER — HOSPITAL ENCOUNTER (OUTPATIENT)
Dept: ULTRASOUND IMAGING | Age: 30
Discharge: HOME | End: 2020-08-11
Payer: COMMERCIAL

## 2020-08-11 DIAGNOSIS — R79.89 ELEVATED LFTS: ICD-10-CM

## 2020-08-11 PROCEDURE — 76705 ECHO EXAM OF ABDOMEN: CPT

## 2020-08-13 DIAGNOSIS — R79.89 ELEVATED LFTS: Primary | ICD-10-CM

## 2020-08-14 NOTE — PROGRESS NOTES
Your liver imaging was slightly abnormal but no specific new diagnosis per the imaging. I suspect you have fatty liver infiltration. Return to clinic in 3 months for liver enzyme testing, schedule a lab appointment. If this remains abnormal, we will get an MRI of the liver to confirm the diagnosis. In the meantime, try to avoid foods high in saturated fats such as processed meats, fried foods, and greasy snacks. Weight loss will also help with this.

## 2020-09-02 ENCOUNTER — PATIENT MESSAGE (OUTPATIENT)
Dept: FAMILY MEDICINE CLINIC | Age: 30
End: 2020-09-02

## 2020-09-03 ENCOUNTER — OFFICE VISIT (OUTPATIENT)
Dept: FAMILY MEDICINE CLINIC | Age: 30
End: 2020-09-03
Payer: COMMERCIAL

## 2020-09-03 VITALS
HEART RATE: 96 BPM | WEIGHT: 293 LBS | SYSTOLIC BLOOD PRESSURE: 128 MMHG | RESPIRATION RATE: 18 BRPM | TEMPERATURE: 98.6 F | OXYGEN SATURATION: 94 % | HEIGHT: 67 IN | DIASTOLIC BLOOD PRESSURE: 71 MMHG | BODY MASS INDEX: 45.99 KG/M2

## 2020-09-03 DIAGNOSIS — F41.9 ANXIETY AND DEPRESSION: ICD-10-CM

## 2020-09-03 DIAGNOSIS — F32.A ANXIETY AND DEPRESSION: ICD-10-CM

## 2020-09-03 DIAGNOSIS — E66.01 OBESITY, MORBID (HCC): ICD-10-CM

## 2020-09-03 DIAGNOSIS — J45.40 MODERATE PERSISTENT ASTHMA WITHOUT COMPLICATION: ICD-10-CM

## 2020-09-03 DIAGNOSIS — R73.01 IMPAIRED FASTING BLOOD SUGAR: ICD-10-CM

## 2020-09-03 DIAGNOSIS — I10 ESSENTIAL HYPERTENSION: Primary | ICD-10-CM

## 2020-09-03 DIAGNOSIS — Z13.220 SCREENING, LIPID: ICD-10-CM

## 2020-09-03 DIAGNOSIS — G47.9 SLEEP DIFFICULTIES: ICD-10-CM

## 2020-09-03 DIAGNOSIS — R79.89 ELEVATED LFTS: ICD-10-CM

## 2020-09-03 PROCEDURE — 99214 OFFICE O/P EST MOD 30 MIN: CPT | Performed by: NURSE PRACTITIONER

## 2020-09-03 RX ORDER — MONTELUKAST SODIUM 10 MG/1
10 TABLET ORAL DAILY
Qty: 90 TAB | Refills: 0 | Status: SHIPPED | OUTPATIENT
Start: 2020-09-03 | End: 2020-11-25

## 2020-09-03 RX ORDER — BUSPIRONE HYDROCHLORIDE 7.5 MG/1
7.5 TABLET ORAL 2 TIMES DAILY
Qty: 60 TAB | Refills: 0 | Status: SHIPPED | OUTPATIENT
Start: 2020-09-03 | End: 2020-09-25 | Stop reason: SDUPTHER

## 2020-09-03 NOTE — PATIENT INSTRUCTIONS
Plans to wean prisqitue, take 50 mg daily x 3 weeks, then stop. Start buspar for anxiety. Continue trazdone for sleep. 4 week follow-up. Continue counseling. [employer] for labs.

## 2020-09-03 NOTE — PROGRESS NOTES
Chief Complaint   Patient presents with    Hypertension     f/u    Diabetes     1. Have you been to the ER, urgent care clinic since your last visit? Hospitalized since your last visit? No    2. Have you seen or consulted any other health care providers outside of the [facility] since your last visit? Include any pap smears or colon screening.  Yes Where: Dr. Lilly Hung : Roseanna Steen 9/1/2020

## 2020-09-03 NOTE — PROGRESS NOTES
[facility] Note  Subjective:      Samina Pereira is a 35 y.o. female who presents for follow-up. Chief Complaint   Patient presents with    Hypertension     f/u    Diabetes     Cardiovascular Review:  The patient has hypertension, paroxymal a fib, aortic valve regurgitation, obesity, prediabetes. Cardiologist is Dr. Molly Arora. Diet and Lifestyle: sedentary, nonsmoker  Medications: Norvasc 10 mg, chlorthalidone 50 mg, lisinopril 20 mg daily, Metformin XR 1,000 daily  Home BP Monitoring: Not well controlled at home, 130-140's/90's  Weight is down 3 pounds. Met with bariatric surgeons, undergoing nutrition, psych and cardiac evaluation. Pertinent ROS: taking medications as instructed, no medication side effects noted, no TIA's, no chest pain on exertion, no dyspnea on exertion, no swelling of ankles.      History of anxiety and depression, sleep difficulties. On Lexapro 10 mg daily, Desvenlafaxine 100 mg daily, trazodone 50 mg nightly. Denies significant depression on current regimen. Sleep improved. She does endorse anxiety, lack of motivation, difficulty focusing, easily upset, tearful. No side effects. Seeing counselor. Previous therapies: Wellbutrin not helpful. She would like to wean of Desvenlafaxine - has been on for several years and not sure if this is helpful. History of asthma. Using albuterol daily in AM for mild chest tightness. Allergies are bad. Taking Claritin. Liver enzymes were elevated. ABD ultrasound 8/11/2020 with Nonspecific heterogeneous hepatic parenchyma. No measurable mass. Plan to continue to monitor and consider MRI if enzymes do not normalize. Current Outpatient Medications   Medication Sig Dispense Refill    busPIRone (BUSPAR) 7.5 mg tablet Take 1 Tab by mouth two (2) times a day. 60 Tab 0    montelukast (SINGULAIR) 10 mg tablet Take 1 Tab by mouth daily.  Indications: controller medication for asthma 90 Tab 0    lisinopriL (PRINIVIL, ZESTRIL) 20 mg tablet Take 1 Tab by mouth daily. Needs follow-up prior to additional refills 15 Tab 0    metFORMIN ER (GLUCOPHAGE XR) 500 mg tablet Take 2 Tabs by mouth daily (with dinner). Needs office visit prior to additional refills. 120 Tab 0    albuterol (PROVENTIL VENTOLIN) 2.5 mg /3 mL (0.083 %) nebu 3 mL by Nebulization route every four (4) hours as needed for Shortness of Breath or Cough. 75 mL 0    chlorthalidone (HYGROTEN) 50 mg tablet TAKE 1 TAB BY MOUTH DAILY. INDICATIONS: HIGH BLOOD PRESSURE **NEED FOLLOW UP 30 Tab 0    escitalopram oxalate (LEXAPRO) 10 mg tablet Take 1 Tab by mouth daily. 90 Tab 0    traZODone (DESYREL) 50 mg tablet Take 1 Tab by mouth nightly. Indications: insomnia associated with depression 90 Tab 0    esomeprazole (NEXIUM) 20 mg capsule Take  by mouth daily.  cholecalciferol (VITAMIN D3) 25 mcg (1,000 unit) cap Take  by mouth daily.  amLODIPine (NORVASC) 10 mg tablet Take 1 Tab by mouth daily. Indications: high blood pressure 90 Tab 1    multivitamin (ONE A DAY) tablet Take 1 Tab by mouth daily.  cyanocobalamin, vitamin B-12, (VITAMIN B12 PO) Take  by mouth.  ascorbic acid, vitamin C, (VITAMIN C) 500 mg tablet Take 1,000 mg by mouth.  albuterol (PROVENTIL HFA, VENTOLIN HFA, PROAIR HFA) 90 mcg/actuation inhaler Take 1 Puff by inhalation every four (4) hours as needed for Wheezing. Shake well before each inhalation. 2 Inhaler 2    Desvenlafaxine 100 mg Tb24 Take 50 mg by mouth daily. 10    ENSKYCE 0.15-0.03 mg tab TAKE 1 TABLET BY MOUTH ONCE DAILY  3     Allergies   Allergen Reactions    Ibuprofen Nausea and Vomiting       ROS:   Complete review of systems was reviewed with pertinent information listed in HPI. Review of Systems   Constitutional: Negative for chills, diaphoresis, fever, malaise/fatigue and weight loss. HENT: Negative for congestion, ear pain, hearing loss, sinus pain, sore throat and tinnitus.     Eyes: Negative for blurred vision and double vision. Respiratory: Negative for cough, hemoptysis, sputum production, shortness of breath and wheezing. See HPI   Cardiovascular: Negative for chest pain, palpitations and leg swelling. Gastrointestinal: Negative for abdominal pain, blood in stool, constipation, diarrhea, heartburn, melena, nausea and vomiting. Genitourinary: Negative for dysuria, frequency, hematuria and urgency. Musculoskeletal: Negative for joint pain and myalgias. Skin: Negative for itching and rash. Neurological: Negative for dizziness, tingling, weakness and headaches. Endo/Heme/Allergies: Positive for environmental allergies. Negative for polydipsia. Psychiatric/Behavioral: Negative for depression, hallucinations, substance abuse and suicidal ideas. The patient is nervous/anxious. The patient does not have insomnia. Objective:     Visit Vitals  /71 (BP 1 Location: Left arm, BP Patient Position: Sitting)   Pulse 96   Temp 98.6 °F (37 °C) (Oral)   Resp 18   Ht 5' 7\" (1.702 m)   Wt (!) 451 lb 9.6 oz (204.8 kg)   SpO2 94%   BMI 70.73 kg/m²       Vitals and Nurse Documentation reviewed. Physical Exam  Vitals signs and nursing note reviewed. Constitutional:       General: She is not in acute distress. Appearance: Normal appearance. She is well-developed and well-groomed. She is morbidly obese. She is not ill-appearing, toxic-appearing or diaphoretic. HENT:      Head: Normocephalic and atraumatic. Right Ear: Hearing normal.      Left Ear: Hearing normal.   Eyes:      General: Lids are normal.      Conjunctiva/sclera: Conjunctivae normal.   Neck:      Musculoskeletal: Full passive range of motion without pain. Thyroid: No thyromegaly. Cardiovascular:      Rate and Rhythm: Normal rate and regular rhythm. Pulses: Normal pulses. Heart sounds: Normal heart sounds, S1 normal and S2 normal. No murmur. No friction rub. No gallop.     Pulmonary:      Effort: Pulmonary effort is normal. No respiratory distress. Breath sounds: Normal breath sounds. No decreased breath sounds, wheezing, rhonchi or rales. Abdominal:      General: Bowel sounds are normal.      Palpations: Abdomen is soft. Tenderness: There is no abdominal tenderness. Musculoskeletal:      Right lower leg: No edema. Left lower leg: No edema. Lymphadenopathy:      Cervical: No cervical adenopathy. Skin:     General: Skin is warm and dry. Capillary Refill: Capillary refill takes less than 2 seconds. Neurological:      Mental Status: She is alert and oriented to person, place, and time. Gait: Gait is intact. Psychiatric:         Attention and Perception: Attention normal.         Mood and Affect: Mood and affect normal. Mood is not anxious or depressed. Affect is not tearful. Speech: Speech normal.         Behavior: Behavior normal. Behavior is cooperative. Thought Content: Thought content normal.         Cognition and Memory: Memory normal.         Judgment: Judgment normal.          \"EXAM: [facility]     INDICATION: Elevated AST and SGPT. Normal serum bilirubin and alkaline  phosphatase. Borderline white blood cell count.     COMPARISON: None     TECHNIQUE: Limited abdominal ultrasound.     FINDINGS:      Liver: echogenicity is heterogeneous. No measurable hepatic mass.      Vasculature: Main portal vein flow is hepatopetal and velocity within normal  limits. Intrahepatic portal and hepatic veins are patent.     Fluid: No ascites.     Gallbladder: Within normal limits. No gallstones. No gallbladder wall thickening  or pericholecystic fluid. Negative sonographic Tian sign.      Bile ducts: There is no intra or extrahepatic biliary ductal dilatation. The  common bile duct measures 3 mm.     Pancreas: The visualized portions are within normal limits.      Kidneys: Right length: 12.1 cm. No hydronephrosis.     IMPRESSION  IMPRESSION:   1.  Nonspecific heterogeneous hepatic parenchyma. No measurable mass. 2. Normal gallbladder and biliary tree. If liver enzymes do not normalize,  consider MRI liver with IV contrast further evaluate. \"    Assessment/Plan:     Diagnoses and all orders for this visit:    1. Essential hypertension: Stable, at goal. Continue current therapy. -     METABOLIC PANEL, COMPREHENSIVE    2. Impaired fasting blood sugar: Repeat A1c. On metformin. Diet and weight loss reviewed. -     METABOLIC PANEL, COMPREHENSIVE  -     HEMOGLOBIN A1C WITH EAG    3. Obesity, morbid ([address]): I have reviewed/discussed the above normal BMI with the patient. I have recommended the following interventions: dietary management education, guidance, and counseling, encourage exercise, monitor weight and prescribed dietary intake. 4. Moderate persistent asthma without complication: Uncontrolled, daily albuterol use for chest tightness. Start singular. Continue PRN albuterol. 5. Anxiety and depression: Continued anxiety, prefers to wean of Desvenlafaxine. Plan to wean Desvenlafaxine, take 50 mg daily x 3 weeks, then stop. Start buspar BID for anxiety. 4 week follow-up. Plan to increase lexapro at this visit and buspar if needed at next visit. Continue counseling.   -     busPIRone (BUSPAR) 7.5 mg tablet; Take 1 Tab by mouth two (2) times a day. 6. Sleep difficulties: well controlled on trazodone. 7. Elevated LFTs: Liver enzymes were elevated. ABD ultrasound 8/11/2020 with \"Nonspecific heterogeneous hepatic parenchyma. No measurable mass. \" Plan to continue to monitor and consider MRI verse GI referral if enzymes do not normalize. Check for hepatitis. -     METABOLIC PANEL, COMPREHENSIVE  -     CHRONIC HEPATITIS PANEL  -     CBC WITH AUTOMATED DIFF    8. Screening, lipid:   -     LIPID PANEL      Follow-up and Dispositions    · Return in about 4 weeks (around 10/1/2020) for Follow-up, Anxiety.        Discussed expected course/resolution/complications of diagnosis in detail with patient.    Medication risks/benefits/costs/interactions/alternatives discussed with patient.    Pt was given an after visit summary which includes diagnoses, current medications & vitals.    Pt expressed understanding with the diagnosis and plan

## 2020-09-09 LAB
ALBUMIN SERPL-MCNC: 3.8 G/DL (ref 3.9–5)
ALBUMIN/GLOB SERPL: 1.3 {RATIO} (ref 1.2–2.2)
ALP SERPL-CCNC: 86 IU/L (ref 39–117)
ALT SERPL-CCNC: 64 IU/L (ref 0–32)
AST SERPL-CCNC: 43 IU/L (ref 0–40)
BASOPHILS # BLD AUTO: 0.1 X10E3/UL (ref 0–0.2)
BASOPHILS NFR BLD AUTO: 0 %
BILIRUB SERPL-MCNC: 0.2 MG/DL (ref 0–1.2)
BUN SERPL-MCNC: 12 MG/DL (ref 6–20)
BUN/CREAT SERPL: 21 (ref 9–23)
CALCIUM SERPL-MCNC: 9.1 MG/DL (ref 8.7–10.2)
CHLORIDE SERPL-SCNC: 98 MMOL/L (ref 96–106)
CHOLEST SERPL-MCNC: 152 MG/DL (ref 100–199)
CO2 SERPL-SCNC: 24 MMOL/L (ref 20–29)
COMMENT, 144067: NORMAL
CREAT SERPL-MCNC: 0.58 MG/DL (ref 0.57–1)
EOSINOPHIL # BLD AUTO: 0.2 X10E3/UL (ref 0–0.4)
EOSINOPHIL NFR BLD AUTO: 1 %
ERYTHROCYTE [DISTWIDTH] IN BLOOD BY AUTOMATED COUNT: 13.1 % (ref 11.7–15.4)
EST. AVERAGE GLUCOSE BLD GHB EST-MCNC: 154 MG/DL
GLOBULIN SER CALC-MCNC: 2.9 G/DL (ref 1.5–4.5)
GLUCOSE SERPL-MCNC: 111 MG/DL (ref 65–99)
HBA1C MFR BLD: 7 % (ref 4.8–5.6)
HBV CORE AB SERPL QL IA: NEGATIVE
HBV CORE IGM SERPL QL IA: NEGATIVE
HBV E AB SERPL QL IA: NEGATIVE
HBV E AG SERPL QL IA: NEGATIVE
HBV SURFACE AB SER QL: REACTIVE
HBV SURFACE AG SERPL QL IA: NEGATIVE
HCT VFR BLD AUTO: 38.8 % (ref 34–46.6)
HCV AB S/CO SERPL IA: <0.1 S/CO RATIO (ref 0–0.9)
HDLC SERPL-MCNC: 60 MG/DL
HGB BLD-MCNC: 13.6 G/DL (ref 11.1–15.9)
IMM GRANULOCYTES # BLD AUTO: 0 X10E3/UL (ref 0–0.1)
IMM GRANULOCYTES NFR BLD AUTO: 0 %
INTERPRETATION, 910389: NORMAL
LDLC SERPL CALC-MCNC: 70 MG/DL (ref 0–99)
LYMPHOCYTES # BLD AUTO: 1.9 X10E3/UL (ref 0.7–3.1)
LYMPHOCYTES NFR BLD AUTO: 15 %
MCH RBC QN AUTO: 32.5 PG (ref 26.6–33)
MCHC RBC AUTO-ENTMCNC: 35.1 G/DL (ref 31.5–35.7)
MCV RBC AUTO: 93 FL (ref 79–97)
MONOCYTES # BLD AUTO: 0.9 X10E3/UL (ref 0.1–0.9)
MONOCYTES NFR BLD AUTO: 7 %
NEUTROPHILS # BLD AUTO: 9.6 X10E3/UL (ref 1.4–7)
NEUTROPHILS NFR BLD AUTO: 77 %
PLATELET # BLD AUTO: 352 X10E3/UL (ref 150–450)
POTASSIUM SERPL-SCNC: 3.4 MMOL/L (ref 3.5–5.2)
PROT SERPL-MCNC: 6.7 G/DL (ref 6–8.5)
RBC # BLD AUTO: 4.19 X10E6/UL (ref 3.77–5.28)
SODIUM SERPL-SCNC: 136 MMOL/L (ref 134–144)
TRIGL SERPL-MCNC: 128 MG/DL (ref 0–149)
VLDLC SERPL CALC-MCNC: 22 MG/DL (ref 5–40)
WBC # BLD AUTO: 12.6 X10E3/UL (ref 3.4–10.8)

## 2020-09-10 DIAGNOSIS — I10 ESSENTIAL HYPERTENSION: ICD-10-CM

## 2020-09-10 NOTE — TELEPHONE ENCOUNTER
Last Visit: 9/3/20  SIMA Wilson  Next Appointment: 10/6/20  SIMA Wilson  Previous Refill Encounter(s): 8/5/20  30    Requested Prescriptions     Pending Prescriptions Disp Refills    chlorthalidone (HYGROTEN) 50 mg tablet 90 Tab 0     Sig: Take 1 Tab by mouth daily.

## 2020-09-11 ENCOUNTER — PATIENT MESSAGE (OUTPATIENT)
Dept: FAMILY MEDICINE CLINIC | Age: 30
End: 2020-09-11

## 2020-09-11 DIAGNOSIS — E87.6 HYPOKALEMIA: Primary | ICD-10-CM

## 2020-09-11 DIAGNOSIS — E11.9 CONTROLLED TYPE 2 DIABETES MELLITUS WITHOUT COMPLICATION, WITHOUT LONG-TERM CURRENT USE OF INSULIN (HCC): ICD-10-CM

## 2020-09-11 DIAGNOSIS — R73.01 IMPAIRED FASTING BLOOD SUGAR: Primary | ICD-10-CM

## 2020-09-11 RX ORDER — POTASSIUM CHLORIDE 750 MG/1
10 TABLET, EXTENDED RELEASE ORAL DAILY
Qty: 90 TAB | Refills: 0 | Status: SHIPPED | OUTPATIENT
Start: 2020-09-11 | End: 2020-12-04

## 2020-09-11 RX ORDER — CHLORTHALIDONE 50 MG/1
50 TABLET ORAL DAILY
Qty: 90 TAB | Refills: 0 | Status: SHIPPED | OUTPATIENT
Start: 2020-09-11 | End: 2020-12-04

## 2020-09-11 RX ORDER — METFORMIN HYDROCHLORIDE 1000 MG/1
1000 TABLET ORAL 2 TIMES DAILY WITH MEALS
Qty: 60 TAB | Refills: 1 | Status: SHIPPED | OUTPATIENT
Start: 2020-09-11 | End: 2020-09-15 | Stop reason: ALTCHOICE

## 2020-09-11 NOTE — PROGRESS NOTES
Curvin Mildred,     Liver enzymes are tending down. Hepatitis screening was negative. Lets continue to monitor. We will repeat in 3 months. Cholesterol looked great. Your potassium is low again, likely due to the chlorthalidone. Since your blood pressure looked great at our last visit, lets start a daily low dose potassium supplement. I will send to the pharmacy and we will repeat you potassium at your next visit. Your A1c was increased from 5.9% to 7%, now in the diabetes range. I recommend we increase your metformin from 1,000 mg daily to 2,000 mg daily. I will send a refill to the pharmacy. Your WBC count was mildly elevated, we can repeat this as well at your next visit. Please let me know if you have any additional questions.   Shad May NP

## 2020-09-15 RX ORDER — METFORMIN HYDROCHLORIDE 500 MG/1
1000 TABLET, EXTENDED RELEASE ORAL
Qty: 180 TAB | Refills: 0 | Status: SHIPPED | OUTPATIENT
Start: 2020-09-15 | End: 2020-11-12 | Stop reason: SDUPTHER

## 2020-09-15 NOTE — TELEPHONE ENCOUNTER
From: Rae Floyd  To: Bautista Servin NP  Sent: 9/11/2020 9:10 AM EDT  Subject: Prescription Question    Do they make the Metformin HCL in extended release? If so could I get those?

## 2020-09-25 DIAGNOSIS — F41.9 ANXIETY AND DEPRESSION: ICD-10-CM

## 2020-09-25 DIAGNOSIS — F32.A ANXIETY AND DEPRESSION: ICD-10-CM

## 2020-09-25 NOTE — TELEPHONE ENCOUNTER
Pharmacy is requesting a 90 days supply on behalf of the patient for Buspar 7.5 mg. Please review and sign if appropriate. Thank you. Last visit 09/03/2020 SIMA George   Next appointment 10/06/2020 SIMA Miller   Previous refill encounter(s) 09/3/2020 Buspar #60. Requested Prescriptions     Pending Prescriptions Disp Refills    busPIRone (BUSPAR) 7.5 mg tablet 180 Tab 0     Sig: Take 1 Tab by mouth two (2) times a day.

## 2020-09-29 RX ORDER — BUSPIRONE HYDROCHLORIDE 7.5 MG/1
7.5 TABLET ORAL 2 TIMES DAILY
Qty: 180 TAB | Refills: 0 | Status: SHIPPED | OUTPATIENT
Start: 2020-09-29 | End: 2020-12-15

## 2020-10-02 DIAGNOSIS — G47.9 SLEEP DIFFICULTIES: ICD-10-CM

## 2020-10-02 DIAGNOSIS — F41.9 ANXIETY AND DEPRESSION: ICD-10-CM

## 2020-10-02 DIAGNOSIS — F32.A ANXIETY AND DEPRESSION: ICD-10-CM

## 2020-10-02 RX ORDER — TRAZODONE HYDROCHLORIDE 50 MG/1
50 TABLET ORAL
Qty: 90 TAB | Refills: 0 | Status: CANCELLED | OUTPATIENT
Start: 2020-10-02

## 2020-10-06 ENCOUNTER — VIRTUAL VISIT (OUTPATIENT)
Dept: FAMILY MEDICINE CLINIC | Age: 30
End: 2020-10-06
Payer: COMMERCIAL

## 2020-10-06 DIAGNOSIS — F41.9 ANXIETY AND DEPRESSION: Primary | ICD-10-CM

## 2020-10-06 DIAGNOSIS — I10 ESSENTIAL HYPERTENSION: ICD-10-CM

## 2020-10-06 DIAGNOSIS — F32.A ANXIETY AND DEPRESSION: Primary | ICD-10-CM

## 2020-10-06 DIAGNOSIS — D72.829 LEUKOCYTOSIS, UNSPECIFIED TYPE: ICD-10-CM

## 2020-10-06 DIAGNOSIS — R79.89 ELEVATED LFTS: ICD-10-CM

## 2020-10-06 DIAGNOSIS — E11.9 CONTROLLED TYPE 2 DIABETES MELLITUS WITHOUT COMPLICATION, WITHOUT LONG-TERM CURRENT USE OF INSULIN (HCC): ICD-10-CM

## 2020-10-06 DIAGNOSIS — G47.9 SLEEP DIFFICULTIES: ICD-10-CM

## 2020-10-06 PROCEDURE — 99214 OFFICE O/P EST MOD 30 MIN: CPT | Performed by: NURSE PRACTITIONER

## 2020-10-06 PROCEDURE — 3051F HG A1C>EQUAL 7.0%<8.0%: CPT | Performed by: NURSE PRACTITIONER

## 2020-10-06 RX ORDER — TRAZODONE HYDROCHLORIDE 50 MG/1
50 TABLET ORAL
Qty: 90 TAB | Refills: 1 | Status: SHIPPED | OUTPATIENT
Start: 2020-10-06 | End: 2020-12-14 | Stop reason: SDUPTHER

## 2020-10-06 RX ORDER — ESCITALOPRAM OXALATE 20 MG/1
20 TABLET ORAL DAILY
Qty: 30 TAB | Refills: 1 | Status: SHIPPED | OUTPATIENT
Start: 2020-10-06 | End: 2020-11-14 | Stop reason: SDUPTHER

## 2020-10-06 NOTE — PROGRESS NOTES
Chief Complaint   Patient presents with    Medication Evaluation     1. Have you been to the ER, urgent care clinic since your last visit? Hospitalized since your last visit? No    2. Have you seen or consulted any other health care providers outside of the [address] [place] [address] since your last visit? Include any pap smears or colon screening.  No

## 2020-10-06 NOTE — PROGRESS NOTES
[facility] Note  Subjective:      Leonor Mcnair is a 35 y.o. female who presents for follow-up. Chief Complaint   Patient presents with    Medication Evaluation   I was in the office while conducting this encounter. Consent:  She and/or her healthcare decision maker is aware that this patient-initiated Telehealth encounter is a billable service, with coverage as determined by her insurance carrier. She is aware that she may receive a bill and has provided verbal consent to proceed: Yes    This virtual visit was conducted via [place] [employer]. Pursuant to the emergency declaration under the [address] waiver authority and the [employer] Act, this Virtual  Visit was conducted to reduce the patient's risk of exposure to COVID-19 and provide continuity of care for an established patient. Services were provided through a video synchronous discussion virtually to substitute for in-person clinic visit. Due to this being a TeleHealth evaluation, many elements of the physical examination are unable to be assessed. Total Time: minutes: 11-20 minutes. Cardiovascular Review:  The patient has hypertension, paroxymal a fib, aortic valve regurgitation, obesity, prediabetes. Cardiologist is Dr. Emily Perez. Diet and Lifestyle: sedentary, nonsmoker  Medications: Norvasc 10 mg, chlorthalidone 50 mg, lisinopril 20 mg daily, Metformin XR 1,000 BID  Home BP Monitoring: not checked. Pertinent ROS: taking medications as instructed, no medication side effects noted, no TIA's, no chest pain on exertion, no dyspnea on exertion, no swelling of ankles.     Liver enzymes were elevated, trending down as of 9/2020. ABD ultrasound 8/11/2020 with Nonspecific heterogeneous hepatic parenchyma. No measurable mass. Plan to repeat enzymes 12/2020 and consider MRI if enzymes do not normalize.      History of anxiety and depression, sleep difficulties. Current symptoms: Denies depression but reports continued anxiety, essentially stable from our last visit. Denies SI/HI. Current treatment:  Lexapro 10 mg daily,  trazodone 50 mg nightly, Buspar. No side effects. Seeing counselor. Previous therapy: Desvenlafaxine 100 mg daily was discontinued gradually over the past 2 months, last dose was 2 weeks ago. Patient requested to stop therapy as she did not think this was helpful. Wellbutrin not helpful. Current Outpatient Medications   Medication Sig Dispense Refill    escitalopram oxalate (LEXAPRO) 20 mg tablet Take 1 Tab by mouth daily. 30 Tab 1    traZODone (DESYREL) 50 mg tablet Take 1 Tab by mouth nightly. Indications: insomnia associated with depression 90 Tab 1    busPIRone (BUSPAR) 7.5 mg tablet Take 1 Tab by mouth two (2) times a day. 180 Tab 0    metFORMIN ER (GLUCOPHAGE XR) 500 mg tablet Take 2 Tabs by mouth daily (with dinner). (Patient taking differently: Take 1,000 mg by mouth two (2) times a day.) 180 Tab 0    chlorthalidone (HYGROTEN) 50 mg tablet Take 1 Tab by mouth daily. 90 Tab 0    lisinopriL (PRINIVIL, ZESTRIL) 20 mg tablet Take 1 Tab by mouth daily. 90 Tab 0    albuterol (PROVENTIL VENTOLIN) 2.5 mg /3 mL (0.083 %) nebu USE 1 VIAL IN NEBULIZAER EVERY 4 HOURS AS NEEDED FOR SHORTNESS OF BREATH OR COUGH 75 mL 0    albuterol (PROVENTIL HFA, VENTOLIN HFA, PROAIR HFA) 90 mcg/actuation inhaler Take 1 Puff by inhalation every four (4) hours as needed for Wheezing. Shake well before each inhalation. 2 Inhaler 2    potassium chloride (KLOR-CON) 10 mEq tablet Take 1 Tab by mouth daily. 90 Tab 0    montelukast (SINGULAIR) 10 mg tablet Take 1 Tab by mouth daily. Indications: controller medication for asthma 90 Tab 0    esomeprazole (NEXIUM) 20 mg capsule Take  by mouth daily.  cholecalciferol (VITAMIN D3) 25 mcg (1,000 unit) cap Take  by mouth daily.       amLODIPine (NORVASC) 10 mg tablet Take 1 Tab by mouth daily. Indications: high blood pressure 90 Tab 1    multivitamin (ONE A DAY) tablet Take 1 Tab by mouth daily.  cyanocobalamin, vitamin B-12, (VITAMIN B12 PO) Take  by mouth.  ascorbic acid, vitamin C, (VITAMIN C) 500 mg tablet Take 1,000 mg by mouth.  ENSKYCE 0.15-0.03 mg tab TAKE 1 TABLET BY MOUTH ONCE DAILY  3     Allergies   Allergen Reactions    Ibuprofen Nausea and Vomiting       ROS:   Complete review of systems was reviewed with pertinent information listed in HPI. Review of Systems   Constitutional: Negative for chills, diaphoresis, fever, malaise/fatigue and weight loss. HENT: Negative for congestion, ear pain, hearing loss, sinus pain, sore throat and tinnitus. Eyes: Negative for blurred vision and double vision. Respiratory: Negative for shortness of breath. Cardiovascular: Negative for chest pain, palpitations and leg swelling. Gastrointestinal: Negative for abdominal pain, blood in stool, constipation, diarrhea, heartburn, melena, nausea and vomiting. Genitourinary: Negative for dysuria, frequency, hematuria and urgency. Musculoskeletal: Negative for joint pain and myalgias. Skin: Negative for itching and rash. Neurological: Negative for dizziness, tingling, weakness and headaches. Endo/Heme/Allergies: Negative for polydipsia. Psychiatric/Behavioral: Negative for depression, hallucinations, memory loss, substance abuse and suicidal ideas. The patient is nervous/anxious. The patient does not have insomnia. Objective: There were no vitals taken for this visit. Vitals and Nurse Documentation reviewed. Physical Exam  Constitutional:       General: She is not in acute distress. Appearance: Normal appearance. She is well-developed and well-groomed. She is obese. She is not ill-appearing, toxic-appearing or diaphoretic. HENT:      Head: Normocephalic and atraumatic.       Right Ear: Hearing normal.      Left Ear: Hearing normal. Nose: No nasal deformity. Mouth/Throat:      Lips: Pink. No lesions. Mouth: Mucous membranes are moist.   Eyes:      General: Lids are normal.      Conjunctiva/sclera: Conjunctivae normal.   Pulmonary:      Effort: Pulmonary effort is normal.   Neurological:      Mental Status: She is alert and oriented to person, place, and time. Gait: Gait is intact. Psychiatric:         Attention and Perception: Attention normal.         Mood and Affect: Mood normal.         Speech: Speech normal.         Behavior: Behavior normal. Behavior is cooperative. Thought Content: Thought content normal.         Cognition and Memory: Cognition normal.         Judgment: Judgment normal.         Assessment/Plan:     Diagnoses and all orders for this visit:    1. Anxiety and depression: Stable, depression resolved, sleep issues resolved. Continued anxiety. Increase lexapro from 10 to 20 mg daily. 6 week follow-up. In CBT. -     escitalopram oxalate (LEXAPRO) 20 mg tablet; Take 1 Tab by mouth daily. -     traZODone (DESYREL) 50 mg tablet; Take 1 Tab by mouth nightly. Indications: insomnia associated with depression    2. Sleep difficulties  -     traZODone (DESYREL) 50 mg tablet; Take 1 Tab by mouth nightly. Indications: insomnia associated with depression    3. Essential hypertension: Stable, at goal. Daily potassium supplement started last month for mild hypokalemia due to thiazide. Continue current therapy. Repeat  labs 12/2020 - she will have labs colleted before office visit. -     METABOLIC PANEL, COMPREHENSIVE  -     MICROALBUMIN, UR, RAND    4. Elevated LFTs: Liver enzymes were elevated, trending down as of 9/2020. ABD ultrasound 8/11/2020 with Nonspecific heterogeneous hepatic parenchyma, no measurable mass. Plan to repeat enzymes 12/2020 and consider MRI if enzymes do not normalize.   -     METABOLIC PANEL, COMPREHENSIVE    5.  Controlled type 2 diabetes mellitus without complication, without long-term current use of insulin ([facility] [address]): A1c increased last month to 7%. We subsequently increased metformin from 1,000 daily to BID. Repeat A1c 12/2020. Consider GLP1 inhibitor for weight loss. -     HEMOGLOBIN A1C WITH EAG  -     MICROALBUMIN, UR, RAND      Follow-up and Dispositions    · Return in about 8 weeks (around 12/1/2020) for HTN, Diabetes, Anxiety.        Discussed expected course/resolution/complications of diagnosis in detail with patient.    Medication risks/benefits/costs/interactions/alternatives discussed with patient.    Pt was given an after visit summary which includes diagnoses, current medications & vitals.    Pt expressed understanding with the diagnosis and plan

## 2020-10-27 ENCOUNTER — HOSPITAL ENCOUNTER (EMERGENCY)
Age: 30
Discharge: HOME | End: 2020-10-27
Attending: EMERGENCY MEDICINE | Admitting: EMERGENCY MEDICINE
Payer: COMMERCIAL

## 2020-10-27 VITALS
RESPIRATION RATE: 18 BRPM | WEIGHT: 293 LBS | HEIGHT: 67 IN | OXYGEN SATURATION: 97 % | SYSTOLIC BLOOD PRESSURE: 115 MMHG | TEMPERATURE: 97.5 F | DIASTOLIC BLOOD PRESSURE: 80 MMHG | BODY MASS INDEX: 45.99 KG/M2 | HEART RATE: 124 BPM

## 2020-10-27 DIAGNOSIS — L03.114 CELLULITIS OF LEFT UPPER EXTREMITY: Primary | ICD-10-CM

## 2020-10-27 PROCEDURE — 99282 EMERGENCY DEPT VISIT SF MDM: CPT

## 2020-10-27 RX ORDER — AMOXICILLIN AND CLAVULANATE POTASSIUM 875; 125 MG/1; MG/1
1 TABLET, FILM COATED ORAL 2 TIMES DAILY
Qty: 14 TAB | Refills: 0 | Status: SHIPPED | OUTPATIENT
Start: 2020-10-27 | End: 2020-11-03

## 2020-10-27 NOTE — ED PROVIDER NOTES
Please note that this dictation was completed with [employer], the computer voice recognition software.  Quite often unanticipated grammatical, syntax, homophones, and other interpretive errors are inadvertently transcribed by the computer software.  Please disregard these errors.  Please excuse any errors that have escaped final proofreading.     Had an implant put in 2 weeks ago/ Dr Violeta Yoo w/ oozing/ redness Thursday (4 days ago);         pt denies HA, vison changes, diff swallowing, CP, SOB, Abd pain, F/Ch, N/V, D/Cons or other current systemic complaints    Social/ PSH reviewed in EMR    EMR Chart Reviewed           Past Medical History:   Diagnosis Date    Anxiety and depression     anxiety    Asthma     Atrial fibrillation (HCC)     Chondromalacia of patella, left     Enlarged heart     Heart murmur     Hypertension     Knee pain, bilateral     Mixed hyperlipidemia     Morbid obesity ([address]     PCOS (polycystic ovarian syndrome)     Pre-diabetes        Past Surgical History:   Procedure Laterality Date    CARDIAC SURG PROCEDURE UNLIST  age 4    2 heart valves \"sewn up\" as an infant   [facility] HX ORTHOPAEDIC Bilateral     as child knees         Family History:   Problem Relation Age of Onset    Mental Retardation Mother     Psychiatric Disorder Mother     Diabetes Maternal Grandmother     Heart Disease Maternal Grandmother     Hypertension Maternal Grandmother     Stroke Maternal Grandmother        Social History     Socioeconomic History    Marital status:      Spouse name: Not on file    Number of children: Not on file    Years of education: Not on file    Highest education level: Not on file   Occupational History    Occupation: security     Comment: 3p-midnight    Social Needs    Financial resource strain: Not on file    Food insecurity     Worry: Not on file     Inability: Not on file    Transportation needs     Medical: Not on file     Non-medical: Not on file   Tobacco Use  Smoking status: Former Smoker     Types: Cigarettes     Last attempt to quit: 2018     Years since quittin.8    Smokeless tobacco: Never Used   Substance and Sexual Activity    Alcohol use: No    Drug use: No    Sexual activity: Yes     Partners: Male     Birth control/protection: Pill   Lifestyle    Physical activity     Days per week: Not on file     Minutes per session: Not on file    Stress: Not on file   Relationships    Social connections     Talks on phone: Not on file     Gets together: Not on file     Attends Shinto service: Not on file     Active member of club or organization: Not on file     Attends meetings of clubs or organizations: Not on file     Relationship status: Not on file    Intimate partner violence     Fear of current or ex partner: Not on file     Emotionally abused: Not on file     Physically abused: Not on file     Forced sexual activity: Not on file   Other Topics Concern    Not on file   Social History Narrative    In the home with mora and his mother          ALLERGIES: Ibuprofen    Review of Systems   Skin: Positive for rash. Vitals:    10/27/20 1240   BP: 115/80   Pulse: (!) 124   Resp: 18   Temp: 97.5 °F (36.4 °C)   SpO2: 97%   Weight: (!) 200 kg (441 lb)   Height: 5' 7\" (1.702 m)            Physical Exam  Vitals signs and nursing note reviewed. Constitutional:       General: She is not in acute distress. Appearance: Normal appearance. She is well-developed. She is obese. She is not ill-appearing, toxic-appearing or diaphoretic. Comments: NAD, AxOx4, speaking in complete sentences     HENT:      Head: Normocephalic and atraumatic. Right Ear: External ear normal.      Left Ear: External ear normal.   Eyes:      General: No scleral icterus. Right eye: No discharge. Left eye: No discharge. Extraocular Movements: Extraocular movements intact.       Conjunctiva/sclera: Conjunctivae normal.      Pupils: Pupils are equal, round, and reactive to light. Neck:      Musculoskeletal: Normal range of motion and neck supple. No muscular tenderness. Vascular: No JVD. Trachea: No tracheal deviation. Cardiovascular:      Rate and Rhythm: Normal rate and regular rhythm. Pulses: Normal pulses. Heart sounds: Normal heart sounds. No murmur. No friction rub. No gallop. Pulmonary:      Effort: Pulmonary effort is normal. No respiratory distress. Breath sounds: Normal breath sounds. No wheezing or rales. Chest:      Chest wall: No tenderness. Abdominal:      General: Bowel sounds are normal.      Palpations: Abdomen is soft. Tenderness: There is no abdominal tenderness. There is no guarding or rebound. Genitourinary:     Vagina: No vaginal discharge. Musculoskeletal: Normal range of motion. General: No swelling, tenderness, deformity or signs of injury. Right lower leg: No edema. Left lower leg: No edema. Skin:     General: Skin is warm and dry. Capillary Refill: Capillary refill takes less than 2 seconds. Coloration: Skin is not jaundiced or pale. Findings: Rash present. No bruising or erythema. Comments: L inner arm - noted redness/ 2 wounds/ no palpable fluid collection/ pt has distal motor/ CV/ Sensation grossly intact    Neurological:      General: No focal deficit present. Mental Status: She is alert and oriented to person, place, and time. Cranial Nerves: No cranial nerve deficit. Sensory: No sensory deficit. Motor: No weakness or abnormal muscle tone. Coordination: Coordination normal.      Gait: Gait normal.      Deep Tendon Reflexes: Reflexes normal.   Psychiatric:         Behavior: Behavior normal.         Thought Content: Thought content normal.          MDM       Procedures        1:17 PM  Xu Vogt's  results have been reviewed with her. She has been counseled regarding her diagnosis.   She verbally conveys understanding and agreement of the signs, symptoms, diagnosis, treatment and prognosis and additionally agrees to Call/ Arrange follow up as recommended with Dr. Tremayne Baptiste NP in 24 - 48 hours. She also agrees with the care-plan and conveys that all of her questions have been answered. I have also put together some discharge instructions for her that include: 1) educational information regarding their diagnosis, 2) how to care for their diagnosis at home, as well a 3) list of reasons why they would want to return to the ED prior to their follow-up appointment, should their condition change or for concerns.

## 2020-10-27 NOTE — DISCHARGE INSTRUCTIONS

## 2020-10-27 NOTE — ED TRIAGE NOTES
Pt reports she had her nexplanon implanted in her L upper inner arm 2 weeks ago and she noticed about a week ago that it was red and pussy.

## 2020-10-28 ENCOUNTER — PATIENT OUTREACH (OUTPATIENT)
Dept: CASE MANAGEMENT | Age: 30
End: 2020-10-28

## 2020-10-28 NOTE — PROGRESS NOTES
Attempted Patient contact regarding recent discharge and COVID-19 risk. [facility] ED 10/27/20 dx-cellulitis of LUE (skin infection)    Noted COVID-19 related testing which was not done at this time. Test results were not done. Patient informed of results, if available? N/a          Patient resolved from Transition of Care episode on 10/28/20. ACM/CTN was unsuccessful at contacting this patient today. Patient has not had any additional ED or hospital visits. No further outreach scheduled with this CTN/ACM. Episode of Care resolved. Patient has this CTN/ACM contact information if future needs arise.

## 2020-11-10 DIAGNOSIS — E11.9 CONTROLLED TYPE 2 DIABETES MELLITUS WITHOUT COMPLICATION, WITHOUT LONG-TERM CURRENT USE OF INSULIN (HCC): ICD-10-CM

## 2020-11-10 DIAGNOSIS — R73.01 IMPAIRED FASTING BLOOD SUGAR: ICD-10-CM

## 2020-11-12 ENCOUNTER — PATIENT MESSAGE (OUTPATIENT)
Dept: FAMILY MEDICINE CLINIC | Age: 30
End: 2020-11-12

## 2020-11-12 DIAGNOSIS — E11.9 CONTROLLED TYPE 2 DIABETES MELLITUS WITHOUT COMPLICATION, WITHOUT LONG-TERM CURRENT USE OF INSULIN (HCC): Primary | ICD-10-CM

## 2020-11-12 RX ORDER — METFORMIN HYDROCHLORIDE 1000 MG/1
TABLET ORAL
Qty: 60 TAB | Refills: 1 | OUTPATIENT
Start: 2020-11-12

## 2020-11-12 RX ORDER — METFORMIN HYDROCHLORIDE 500 MG/1
1000 TABLET, EXTENDED RELEASE ORAL 2 TIMES DAILY
Qty: 120 TAB | Refills: 2 | Status: SHIPPED | OUTPATIENT
Start: 2020-11-12 | End: 2020-11-13 | Stop reason: ALTCHOICE

## 2020-11-13 ENCOUNTER — OFFICE VISIT (OUTPATIENT)
Dept: FAMILY MEDICINE CLINIC | Age: 30
End: 2020-11-13
Payer: COMMERCIAL

## 2020-11-13 VITALS
TEMPERATURE: 97.2 F | BODY MASS INDEX: 45.99 KG/M2 | OXYGEN SATURATION: 97 % | RESPIRATION RATE: 18 BRPM | WEIGHT: 293 LBS | HEIGHT: 67 IN | SYSTOLIC BLOOD PRESSURE: 118 MMHG | HEART RATE: 86 BPM | DIASTOLIC BLOOD PRESSURE: 80 MMHG

## 2020-11-13 DIAGNOSIS — G56.03 BILATERAL CARPAL TUNNEL SYNDROME: ICD-10-CM

## 2020-11-13 DIAGNOSIS — E87.6 HYPOKALEMIA: ICD-10-CM

## 2020-11-13 DIAGNOSIS — I10 ESSENTIAL HYPERTENSION: ICD-10-CM

## 2020-11-13 DIAGNOSIS — D72.829 LEUKOCYTOSIS, UNSPECIFIED TYPE: ICD-10-CM

## 2020-11-13 DIAGNOSIS — E66.01 MORBID OBESITY (HCC): ICD-10-CM

## 2020-11-13 DIAGNOSIS — R79.89 ELEVATED LFTS: ICD-10-CM

## 2020-11-13 DIAGNOSIS — E11.9 CONTROLLED TYPE 2 DIABETES MELLITUS WITHOUT COMPLICATION, WITHOUT LONG-TERM CURRENT USE OF INSULIN (HCC): Primary | ICD-10-CM

## 2020-11-13 PROCEDURE — 3051F HG A1C>EQUAL 7.0%<8.0%: CPT | Performed by: NURSE PRACTITIONER

## 2020-11-13 PROCEDURE — 99214 OFFICE O/P EST MOD 30 MIN: CPT | Performed by: NURSE PRACTITIONER

## 2020-11-13 RX ORDER — METFORMIN HYDROCHLORIDE 1000 MG/1
1000 TABLET ORAL 2 TIMES DAILY WITH MEALS
Qty: 180 TAB | Refills: 0 | Status: SHIPPED | OUTPATIENT
Start: 2020-11-13 | End: 2021-01-29 | Stop reason: SDUPTHER

## 2020-11-13 RX ORDER — ETONOGESTREL 68 MG/1
IMPLANT SUBCUTANEOUS
COMMUNITY

## 2020-11-13 RX ORDER — AMOXICILLIN AND CLAVULANATE POTASSIUM 875; 125 MG/1; MG/1
TABLET, FILM COATED ORAL
COMMUNITY
Start: 2020-11-04 | End: 2021-02-10 | Stop reason: ALTCHOICE

## 2020-11-13 RX ORDER — ESOMEPRAZOLE MAGNESIUM 20 MG/1
TABLET, DELAYED RELEASE ORAL DAILY
COMMUNITY
End: 2021-02-10 | Stop reason: SDUPTHER

## 2020-11-13 RX ORDER — PEN NEEDLE, DIABETIC 30 GX3/16"
NEEDLE, DISPOSABLE MISCELLANEOUS
Qty: 1 PACKAGE | Refills: 11 | Status: SHIPPED | OUTPATIENT
Start: 2020-11-13 | End: 2021-12-08 | Stop reason: SDUPTHER

## 2020-11-13 NOTE — PROGRESS NOTES
Identified pt with two pt identifiers(name and ). Reviewed record in preparation for visit and have obtained necessary documentation. Chief Complaint   Patient presents with    Hand Pain     Both hands tingling and numbness and unable to sleep. Vitals:    20 1108   Weight: (!) 448 lb 6.4 oz (203.4 kg)   Height: 5' 7\" (1.702 m)   PainSc:   7   PainLoc: Hand       Health Maintenance Due   Topic    Pneumococcal 0-64 years (1 of 1 - PPSV23)    Foot Exam Q1     MICROALBUMIN Q1     Eye Exam Retinal or Dilated     DTaP/Tdap/Td series (1 - Tdap)    Flu Vaccine (1)       Coordination of Care Questionnaire:  :   1) Have you been to an emergency room, urgent care, or hospitalized since your last visit? If yes, where when, and reason for visit? [facility] 10/2020 DX: Skin Infection       2. Have seen or consulted any other health care provider since your last visit? If yes, where when, and reason for visit? [facility] 10/30/20      Patient is accompanied by self I have received verbal consent from Keerthi Olguin to discuss any/all medical information while they are present in the room.

## 2020-11-13 NOTE — PATIENT INSTRUCTIONS
Learning About Insulin Pens What is an insulin pen? An insulin pen is a device for giving insulin shots. It looks like a pen. You can set the dose of insulin with a dial on the outside of the pen. You use the pen to give the insulin shot (injection). Both disposable and reusable insulin pens are available. With a disposable pen, a set amount of insulin comes in the pen ready to use. When the insulin is used up, you throw the pen away. You use a new pen the next time you need insulin. With a reusable pen, you don't throw the pen away. Instead, you reload the pen with a pre-measured cartridge of insulin. When the insulin is used up, you insert a new cartridge into the pen. Disposable and reusable pens both need a new needle with each shot. The needles come in different lengths and widths. [place] needles will prevent injecting into the muscle, especially in children or people who are lean. Thinner-width needles reduce the pricking sensation. Width is measured by gauge. The higher the number, the thinner the needle. Why do some people prefer pens? · Most people find that insulin pens are easier to use than a bottle and syringe. · Many people feel less pain (or no pain) with the smaller insulin pen needle, compared to a syringe needle. · Insulin pens may help you give yourself more accurate doses. When you draw insulin into a syringe, you must carefully measure so that you don't get too much or too little. But with a pen, you set a dial for the amount of insulin you want, and then you push the button. · Insulin pens may work better than syringes for people who don't see well or who have problems like arthritis that make it harder to use a syringe. · Using an insulin pen draws less attention from others. You can give yourself insulin with fewer people noticing. · You don't need to carry insulin bottles and syringes everywhere you go. An insulin pen fits into a pocket or purse. What should you know about insulin pens? Each pen delivers a different brand and type (or types) of insulin. Some deliver rapid-acting insulin. Others deliver long-acting insulin. And some pens deliver a mixture of both in one shot. Pens have different colored labels, cartridge holders, or dosing knobs. Many pens have special features. For example, some pens have springs so that it takes less force to deliver a dose of insulin. Other pens have signals you can hear that let you know the insulin has been delivered. Some have memory to show the amount and time of the last dose. How do you use an insulin pen? 1. For a reusable pen, put the insulin cartridge into the pen. Disposable pens already have an insulin cartridge. Follow the directions for how to screw a new needle onto your pen. Before using cloudy insulin, such as NPH and premixed insulin, gently roll the pen between your palms 10 times, then tip the pen up and down 10 times. Do not shake the pen. The insulin should look milky white. 2. Remove the outer cap from the needle. Keep this cap to use later. 3. Remove the inner cover from the needle. Be careful not to prick yourself. 4. Before each shot, prime the needle. Priming removes air from the needle. Turn the dose knob to 2 units. Hold your pen with the needle pointing up. Tap the cartridge salgado gently to move any air bubbles to the top. Push the injection button all the way in. Watch for a stream or drop of insulin to come out of the needle. If it does not, repeat this step again. 5. Clean the area of skin where you will give the shot. If you use alcohol to clean the skin, let it dry. Use a different spot each time you inject insulin. That's because using the same spot every time can cause bumps or pits to form in the skin. For example, inject your insulin above your belly button, then the next time use your upper thigh, and then the next time inject below your belly button. 6. Turn the dose knob to the number of units of insulin you need to inject. Push the needle into your skin. Most people can inject using a 90-degree angle and without pinching the skin. Adults and children who are very lean and people who use longer needles may need to pinch the skin to avoid injecting into muscle. 7. Put your thumb on the injection button and push it in until it stops. Keep the pen in your skin. Hold the dose knob in for 10 seconds (or to the number that the  recommends). Then pull the needle out of your skin. Do not rub the area. 8. Put only the outer cap back over the needle. The thin inner cover is harder to put back on, and you could stick yourself. 9. After covering the needle with the outer cap, unscrew the needle and throw it away in a sharps container or other solid plastic container. You can get a sharps container at your drugstore. 10. Always read the insulin package information that tells the best way to store your insulin pen and insulin cartridges. In general, unopened insulin for pens will last longer if it is kept in the refrigerator. After insulin is opened, most manufacturers say to store it at room temperature. Don't share insulin pens with anyone else who uses insulin. Even when the needle is changed, an insulin pen can carry bacteria or blood that can make another person sick. Where can you learn more? Go to http://www.gray.com/ Enter M910 in the search box to learn more about \"Learning About Insulin Pens. \" Current as of: December 20, 2019               Content Version: 12.6 © 3767-6375 [employer]. Care instructions adapted under license by [employer] (which disclaims liability or warranty for this information).  If you have questions about a medical condition or this instruction, always ask your healthcare professional. Mihir Harrington disclaims any warranty or liability for your use of this information.

## 2020-11-13 NOTE — TELEPHONE ENCOUNTER
From: Jocelynn Patel  To: Brad Landers NP  Sent: 11/12/2020 4:13 PM EST  Subject: Prescription Question    I have been taking the 1000 milligram metformin not the Extended release.

## 2020-11-13 NOTE — PROGRESS NOTES
[address] [facility] Note  Subjective:      Baron Rodríguez is a 35 y.o. female who presents for an acute visit with the following chief complaints. Chief Complaint   Patient presents with    Hand Pain     Both hands tingling and numbness and unable to sleep. Wrist Pain  Patient complains of bilateral wrist and hand tingling and numbness. Worse at night during sleep, alleviated with nothing in particular. She is splinting nightly with continued symptoms. Pain has been present for several months. There is a history of carpal tunnel. Cardiovascular Review:  The patient has hypertension, paroxymal a fib, aortic valve regurgitation, obesity, DM2. Cardiologist is Dr. Peter Mojica. Diet and Lifestyle: sedentary, nonsmoker  Medications: Norvasc 10 mg, chlorthalidone 50 mg, lisinopril 20 mg daily, Metformin 1,000 BID. Home BP Monitoring: not checked. Pertinent ROS: taking medications as instructed, no medication side effects noted, no TIA's, no chest pain on exertion, no dyspnea on exertion, no swelling of ankles.   She is interested in Trulicity for weight loss. She had nexplanon placed in left arm last month, very slow healing and had infection - she is finishing Augmentin with significant improvement in redness and resolution of discomfort.      Liver enzymes were elevated, trending down as of 9/2020. ABD ultrasound 8/11/2020 with Nonspecific heterogeneous hepatic parenchyma. No measurable mass. Plan to repeat enzymes 12/2020 and consider MRI if enzymes do not normalize.      History of anxiety and depression, sleep difficulties. Current symptoms: None. Reports she is doing really well on current regimen. Denies anxiety or depression, sleeping well nightly. Current treatment:  Lexapro 20 mg daily,  trazodone 50 mg nightly, Buspar. No side effects. Seeing counselor. Previous therapy: Desvenlafaxine not helpful. Wellbutrin not helpful.     Current Outpatient Medications Medication Sig Dispense Refill    metFORMIN (GLUCOPHAGE) 1,000 mg tablet Take 1 Tab by mouth two (2) times daily (with meals). 180 Tab 0    amoxicillin-clavulanate (AUGMENTIN) 875-125 mg per tablet TAKE 1 TABLET BY MOUTH EVERY 12 HOURS FOR 7 DAYS      esomeprazole magnesium 20 mg TbEC Take  by mouth daily.  etonogestreL (Nexplanon) 68 mg impl by SubDERmal route.  liraglutide (VICTOZA) 0.6 mg/0.1 mL (18 mg/3 mL) pnij Take 0.6 mg subcutaneously once daily for 1 week, then increase to 1.2 mg once daily 5 Each 0    Insulin Needles, Disposable, 31 gauge x 5/16\" ndle Use daily with victoza 1 Package 11    escitalopram oxalate (LEXAPRO) 20 mg tablet Take 1 Tab by mouth daily. 30 Tab 1    traZODone (DESYREL) 50 mg tablet Take 1 Tab by mouth nightly. Indications: insomnia associated with depression 90 Tab 1    busPIRone (BUSPAR) 7.5 mg tablet Take 1 Tab by mouth two (2) times a day. 180 Tab 0    chlorthalidone (HYGROTEN) 50 mg tablet Take 1 Tab by mouth daily. 90 Tab 0    lisinopriL (PRINIVIL, ZESTRIL) 20 mg tablet Take 1 Tab by mouth daily. 90 Tab 0    albuterol (PROVENTIL HFA, VENTOLIN HFA, PROAIR HFA) 90 mcg/actuation inhaler Take 1 Puff by inhalation every four (4) hours as needed for Wheezing. Shake well before each inhalation. 2 Inhaler 2    potassium chloride (KLOR-CON) 10 mEq tablet Take 1 Tab by mouth daily. 90 Tab 0    montelukast (SINGULAIR) 10 mg tablet Take 1 Tab by mouth daily. Indications: controller medication for asthma 90 Tab 0    esomeprazole (NEXIUM) 20 mg capsule Take  by mouth daily.  cholecalciferol (VITAMIN D3) 25 mcg (1,000 unit) cap Take  by mouth daily.  amLODIPine (NORVASC) 10 mg tablet Take 1 Tab by mouth daily. Indications: high blood pressure 90 Tab 1    multivitamin (ONE A DAY) tablet Take 1 Tab by mouth daily.  cyanocobalamin, vitamin B-12, (VITAMIN B12 PO) Take  by mouth.       ascorbic acid, vitamin C, (VITAMIN C) 500 mg tablet Take 1,000 mg by mouth.      albuterol (PROVENTIL VENTOLIN) 2.5 mg /3 mL (0.083 %) nebu USE 1 VIAL IN NEBULIZAER EVERY 4 HOURS AS NEEDED FOR SHORTNESS OF BREATH OR COUGH 75 mL 0     Allergies   Allergen Reactions    Ibuprofen Nausea and Vomiting       ROS:   Complete review of systems was reviewed with pertinent information listed in HPI. Review of Systems   Constitutional: Negative for chills, diaphoresis, fever, malaise/fatigue and weight loss. HENT: Negative for congestion, ear pain, hearing loss, sinus pain, sore throat and tinnitus. Eyes: Negative for blurred vision and double vision. Respiratory: Negative for shortness of breath. Cardiovascular: Negative for chest pain, palpitations and leg swelling. Gastrointestinal: Negative for abdominal pain, blood in stool, constipation, diarrhea, heartburn, melena, nausea and vomiting. Genitourinary: Negative for dysuria, frequency, hematuria and urgency. Musculoskeletal: Negative for joint pain and myalgias. Skin: Negative for itching and rash. Neurological: Positive for tingling and focal weakness. Negative for dizziness, tremors, sensory change, speech change, weakness and headaches. In hands   Endo/Heme/Allergies: Negative for polydipsia. Psychiatric/Behavioral: Negative. Objective:     Visit Vitals  /80 (BP 1 Location: Left arm, BP Patient Position: Sitting)   Pulse 86   Temp 97.2 °F (36.2 °C) (Temporal)   Resp 18   Ht 5' 7\" (1.702 m)   Wt (!) 448 lb 6.4 oz (203.4 kg)   SpO2 97%   BMI 70.23 kg/m²       Vitals and Nurse Documentation reviewed. Physical Exam  Vitals signs and nursing note reviewed. Constitutional:       General: She is not in acute distress. Appearance: She is obese. She is not ill-appearing, toxic-appearing or diaphoretic. HENT:      Head: Normocephalic and atraumatic. Neck:      Vascular: No carotid bruit. Cardiovascular:      Rate and Rhythm: Normal rate and regular rhythm.       Heart sounds: Normal heart sounds. No murmur. No friction rub. No gallop. Pulmonary:      Effort: Pulmonary effort is normal. No respiratory distress. Breath sounds: Normal breath sounds. No wheezing or rales. Chest:      Chest wall: No tenderness. Skin:     General: Skin is warm and dry. Capillary Refill: Capillary refill takes less than 2 seconds. Neurological:      Mental Status: She is alert and oriented to person, place, and time. Sensory: Sensation is intact. Gait: Gait is intact. Psychiatric:         Mood and Affect: Mood and affect normal.         Behavior: Behavior normal.         Thought Content: Thought content normal.         Cognition and Memory: Memory normal.         Judgment: Judgment normal.         Assessment/Plan:     Diagnoses and all orders for this visit:    1. Controlled type 2 diabetes mellitus without complication, without long-term current use of insulin ([facility] [address]):   A1c 7% 9/2020, metformin was subsequently increased to 1,000 mg BID. She would like to start GLP-1 for weight loss and glycemic control; Start Victoza. 6 week follow-up for med check and weight check. If tolerating continue to increase to 3 mg daily for optimization of weight loss. -     liraglutide (VICTOZA) 0.6 mg/0.1 mL (18 mg/3 mL) pnij; Take 0.6 mg subcutaneously once daily for 1 week, then increase to 1.2 mg once daily    2. Morbid obesity (HCC)  -     liraglutide (VICTOZA) 0.6 mg/0.1 mL (18 mg/3 mL) pnij; Take 0.6 mg subcutaneously once daily for 1 week, then increase to 1.2 mg once daily    3. Bilateral carpal tunnel syndrome: Persistent symptoms despite nightly splinting. No neurovascular deficits on exam. Referred to ortho. -     REFERRAL TO ORTHOPEDICS    4. Elevated LFTs:  Liver enzymes were elevated, trending down as of 9/2020.  ABD ultrasound 8/11/2020 with Nonspecific heterogeneous hepatic parenchyma, no measurable mass. Plan to repeat enzymes 12/2020 and consider MRI if enzymes do not normalize.     5. Essential hypertension:  Stable, at goal. On low dose potassium supplement for mild hypokalemia due to thiazide. Continue current therapy. Repeat labs 12/2020.    6. Leukocytosis, unspecified type: Very mild elevation with last routine labs, plans to repeat with next lab visit. Deferred today as she is completing Augmentin for cellulitis of left arm, resolving. 7. Hypokalemia: see #5    Follow-up and Dispositions    · Return in about 4 weeks (around 12/11/2020) for Diabetes, weight check.        Discussed expected course/resolution/complications of diagnosis in detail with patient.    Medication risks/benefits/costs/interactions/alternatives discussed with patient.    Pt was given an after visit summary which includes diagnoses, current medications & vitals.    Pt expressed understanding with the diagnosis and plan

## 2020-11-16 DIAGNOSIS — F41.9 ANXIETY AND DEPRESSION: ICD-10-CM

## 2020-11-16 DIAGNOSIS — F32.A ANXIETY AND DEPRESSION: ICD-10-CM

## 2020-11-18 NOTE — TELEPHONE ENCOUNTER
Requested Prescriptions     Pending Prescriptions Disp Refills    escitalopram oxalate (LEXAPRO) 20 mg tablet 30 Tab 1     Sig: Take 1 Tab by mouth daily.

## 2020-11-19 RX ORDER — ESCITALOPRAM OXALATE 20 MG/1
20 TABLET ORAL DAILY
Qty: 30 TAB | Refills: 1 | OUTPATIENT
Start: 2020-11-19

## 2020-11-24 DIAGNOSIS — J45.40 MODERATE PERSISTENT ASTHMA WITHOUT COMPLICATION: ICD-10-CM

## 2020-11-25 RX ORDER — MONTELUKAST SODIUM 10 MG/1
10 TABLET ORAL DAILY
Qty: 90 TAB | Refills: 0 | Status: SHIPPED | OUTPATIENT
Start: 2020-11-25 | End: 2021-06-01

## 2020-12-04 DIAGNOSIS — I10 ESSENTIAL HYPERTENSION: ICD-10-CM

## 2020-12-04 DIAGNOSIS — E87.6 HYPOKALEMIA: ICD-10-CM

## 2020-12-04 DIAGNOSIS — J45.40 MODERATE PERSISTENT ASTHMA WITHOUT COMPLICATION: ICD-10-CM

## 2020-12-04 RX ORDER — POTASSIUM CHLORIDE 750 MG/1
10 TABLET, FILM COATED, EXTENDED RELEASE ORAL 2 TIMES DAILY
Qty: 90 TAB | Refills: 1 | Status: SHIPPED | OUTPATIENT
Start: 2020-12-04 | End: 2021-03-16 | Stop reason: SDUPTHER

## 2020-12-04 RX ORDER — LISINOPRIL 20 MG/1
20 TABLET ORAL DAILY
Qty: 90 TAB | Refills: 0 | Status: CANCELLED | OUTPATIENT
Start: 2020-12-04

## 2020-12-04 RX ORDER — ALBUTEROL SULFATE 0.83 MG/ML
SOLUTION RESPIRATORY (INHALATION)
Qty: 75 ML | Refills: 0 | Status: CANCELLED | OUTPATIENT
Start: 2020-12-04

## 2020-12-04 RX ORDER — POTASSIUM CHLORIDE 750 MG/1
10 TABLET, EXTENDED RELEASE ORAL DAILY
Qty: 90 TAB | Refills: 0 | Status: CANCELLED | OUTPATIENT
Start: 2020-12-04

## 2020-12-04 RX ORDER — AMLODIPINE BESYLATE 10 MG/1
10 TABLET ORAL DAILY
Qty: 90 TAB | Refills: 1 | Status: CANCELLED | OUTPATIENT
Start: 2020-12-04

## 2020-12-04 RX ORDER — CHLORTHALIDONE 50 MG/1
50 TABLET ORAL DAILY
Qty: 90 TAB | Refills: 0 | Status: CANCELLED | OUTPATIENT
Start: 2020-12-04

## 2020-12-04 RX ORDER — CHLORTHALIDONE 50 MG/1
50 TABLET ORAL DAILY
Qty: 90 TAB | Refills: 1 | Status: SHIPPED | OUTPATIENT
Start: 2020-12-04 | End: 2021-06-01 | Stop reason: SDUPTHER

## 2020-12-04 NOTE — TELEPHONE ENCOUNTER
Last visit 11/13/2020 SIMA Rodrigez   Next appointment 12/14/2020 MD Tashia Terry   Previous refill encounter(s)   03/10/2020 Norvasc #90 with 1 refill,  09/11/2020 Prinivil #90. Requested Prescriptions     Pending Prescriptions Disp Refills    amLODIPine (NORVASC) 10 mg tablet 90 Tab 1     Sig: Take 1 Tab by mouth daily. Indications: high blood pressure    lisinopriL (PRINIVIL, ZESTRIL) 20 mg tablet 90 Tab 1     Sig: Take 1 Tab by mouth daily.

## 2020-12-08 RX ORDER — LISINOPRIL 20 MG/1
20 TABLET ORAL DAILY
Qty: 90 TAB | Refills: 1 | Status: SHIPPED | OUTPATIENT
Start: 2020-12-08 | End: 2021-06-01 | Stop reason: SDUPTHER

## 2020-12-08 RX ORDER — AMLODIPINE BESYLATE 10 MG/1
10 TABLET ORAL DAILY
Qty: 90 TAB | Refills: 1 | Status: SHIPPED | OUTPATIENT
Start: 2020-12-08 | End: 2021-06-01 | Stop reason: SDUPTHER

## 2020-12-14 ENCOUNTER — OFFICE VISIT (OUTPATIENT)
Dept: FAMILY MEDICINE CLINIC | Age: 30
End: 2020-12-14
Payer: COMMERCIAL

## 2020-12-14 VITALS
HEIGHT: 67 IN | DIASTOLIC BLOOD PRESSURE: 78 MMHG | SYSTOLIC BLOOD PRESSURE: 137 MMHG | BODY MASS INDEX: 45.99 KG/M2 | HEART RATE: 96 BPM | OXYGEN SATURATION: 99 % | RESPIRATION RATE: 18 BRPM | WEIGHT: 293 LBS | TEMPERATURE: 98.6 F

## 2020-12-14 DIAGNOSIS — E11.59 HYPERTENSION ASSOCIATED WITH DIABETES (HCC): ICD-10-CM

## 2020-12-14 DIAGNOSIS — F41.9 ANXIETY AND DEPRESSION: ICD-10-CM

## 2020-12-14 DIAGNOSIS — F32.A DEPRESSION, UNSPECIFIED DEPRESSION TYPE: ICD-10-CM

## 2020-12-14 DIAGNOSIS — R20.0 LEFT LEG NUMBNESS: ICD-10-CM

## 2020-12-14 DIAGNOSIS — F41.9 ANXIETY: ICD-10-CM

## 2020-12-14 DIAGNOSIS — E66.01 OBESITY, MORBID (HCC): ICD-10-CM

## 2020-12-14 DIAGNOSIS — I15.2 HYPERTENSION ASSOCIATED WITH DIABETES (HCC): ICD-10-CM

## 2020-12-14 DIAGNOSIS — K59.00 CONSTIPATION, UNSPECIFIED CONSTIPATION TYPE: ICD-10-CM

## 2020-12-14 DIAGNOSIS — R20.0 LEG NUMBNESS: ICD-10-CM

## 2020-12-14 DIAGNOSIS — Z72.820 POOR SLEEP: ICD-10-CM

## 2020-12-14 DIAGNOSIS — E87.6 HYPOKALEMIA: ICD-10-CM

## 2020-12-14 DIAGNOSIS — F32.A ANXIETY AND DEPRESSION: ICD-10-CM

## 2020-12-14 DIAGNOSIS — Z91.89 AT RISK FOR OBSTRUCTIVE SLEEP APNEA: ICD-10-CM

## 2020-12-14 DIAGNOSIS — E11.9 CONTROLLED TYPE 2 DIABETES MELLITUS WITHOUT COMPLICATION, WITHOUT LONG-TERM CURRENT USE OF INSULIN (HCC): Primary | ICD-10-CM

## 2020-12-14 DIAGNOSIS — D72.829 LEUKOCYTOSIS, UNSPECIFIED TYPE: ICD-10-CM

## 2020-12-14 DIAGNOSIS — G47.9 SLEEP DISORDER: ICD-10-CM

## 2020-12-14 LAB
ALBUMIN SERPL-MCNC: 3.8 G/DL (ref 3.5–5)
ALBUMIN/GLOB SERPL: 1 {RATIO} (ref 1.1–2.2)
ALP SERPL-CCNC: 107 U/L (ref 45–117)
ALT SERPL-CCNC: 32 U/L (ref 12–78)
ANION GAP SERPL CALC-SCNC: 7 MMOL/L (ref 5–15)
AST SERPL-CCNC: 16 U/L (ref 15–37)
BASOPHILS # BLD: 0 K/UL (ref 0–0.1)
BASOPHILS NFR BLD: 0 % (ref 0–1)
BILIRUB SERPL-MCNC: 0.3 MG/DL (ref 0.2–1)
BUN SERPL-MCNC: 11 MG/DL (ref 6–20)
BUN/CREAT SERPL: 18 (ref 12–20)
CALCIUM SERPL-MCNC: 9.3 MG/DL (ref 8.5–10.1)
CHLORIDE SERPL-SCNC: 103 MMOL/L (ref 97–108)
CO2 SERPL-SCNC: 24 MMOL/L (ref 21–32)
CREAT SERPL-MCNC: 0.61 MG/DL (ref 0.55–1.02)
CREAT UR-MCNC: 117 MG/DL
DIFFERENTIAL METHOD BLD: ABNORMAL
EOSINOPHIL # BLD: 0.1 K/UL (ref 0–0.4)
EOSINOPHIL NFR BLD: 1 % (ref 0–7)
ERYTHROCYTE [DISTWIDTH] IN BLOOD BY AUTOMATED COUNT: 14.2 % (ref 11.5–14.5)
EST. AVERAGE GLUCOSE BLD GHB EST-MCNC: 134 MG/DL
GLOBULIN SER CALC-MCNC: 4 G/DL (ref 2–4)
GLUCOSE SERPL-MCNC: 81 MG/DL (ref 65–100)
HBA1C MFR BLD: 6.3 % (ref 4–5.6)
HCT VFR BLD AUTO: 42.3 % (ref 35–47)
HGB BLD-MCNC: 13.7 G/DL (ref 11.5–16)
IMM GRANULOCYTES # BLD AUTO: 0.1 K/UL (ref 0–0.04)
IMM GRANULOCYTES NFR BLD AUTO: 1 % (ref 0–0.5)
LYMPHOCYTES # BLD: 2.1 K/UL (ref 0.8–3.5)
LYMPHOCYTES NFR BLD: 14 % (ref 12–49)
MCH RBC QN AUTO: 31.6 PG (ref 26–34)
MCHC RBC AUTO-ENTMCNC: 32.4 G/DL (ref 30–36.5)
MCV RBC AUTO: 97.7 FL (ref 80–99)
MICROALBUMIN UR-MCNC: 1.83 MG/DL
MICROALBUMIN/CREAT UR-RTO: 16 MG/G (ref 0–30)
MONOCYTES # BLD: 1 K/UL (ref 0–1)
MONOCYTES NFR BLD: 7 % (ref 5–13)
NEUTS SEG # BLD: 11.4 K/UL (ref 1.8–8)
NEUTS SEG NFR BLD: 77 % (ref 32–75)
NRBC # BLD: 0 K/UL (ref 0–0.01)
NRBC BLD-RTO: 0 PER 100 WBC
PLATELET # BLD AUTO: 370 K/UL (ref 150–400)
PMV BLD AUTO: 10 FL (ref 8.9–12.9)
POTASSIUM SERPL-SCNC: 4.1 MMOL/L (ref 3.5–5.1)
PROT SERPL-MCNC: 7.8 G/DL (ref 6.4–8.2)
RBC # BLD AUTO: 4.33 M/UL (ref 3.8–5.2)
SODIUM SERPL-SCNC: 134 MMOL/L (ref 136–145)
WBC # BLD AUTO: 14.7 K/UL (ref 3.6–11)

## 2020-12-14 PROCEDURE — 99214 OFFICE O/P EST MOD 30 MIN: CPT | Performed by: FAMILY MEDICINE

## 2020-12-14 RX ORDER — TRAZODONE HYDROCHLORIDE 100 MG/1
100 TABLET ORAL
Qty: 90 TAB | Refills: 1 | Status: SHIPPED | OUTPATIENT
Start: 2020-12-14 | End: 2021-06-01 | Stop reason: SDUPTHER

## 2020-12-14 NOTE — PROGRESS NOTES
1. Have you been to the ER, urgent care clinic since your last visit? Hospitalized since your last visit? No    2. Have you seen or consulted any other health care providers outside of the [address] [place] [address] since your last visit? Include any pap smears or colon screening. Dr. Shannan Blackwell- orthopedics 11/18/20 had cortisone injections on wrists for carpal tunnel. Chief Complaint   Patient presents with    Diabetes     4 week follow up    Weight Management     4 week follow up     Fasting    Health Maintenance Due   Topic Date Due    Pneumococcal 0-64 years (1 of 1 - PPSV23) 06/14/1997    Foot Exam Q1  06/14/2001    MICROALBUMIN Q1  06/14/2001    Eye Exam Retinal or Dilated  06/14/2001    DTaP/Tdap/Td series (1 - Tdap) 06/14/2012     Eye exam: had one done within the past 2 years.

## 2020-12-14 NOTE — PATIENT INSTRUCTIONS
Weight Loss Tips: 
Remember this is like a part time job so your motivation and commitment is key to your success. Mindset Weight loss like any other behavior change starts in your mind. Think hard about what your motivates you to lose weight then meditate on that. Remind yourself of your motivation 
with phone alarms, scheduled meditation time, vision board, journal- just to name a few ideas. Have realistic goals. We expect with diligent healthy diet and physical activity you can lose 5% of your body weight in 3 
months. Wt in lbs x 0.05 = #lbs you should lose in 3 months. Make food and activity changes with a goal of CONSISTENCY not perfection. Food Start eating differently. Most of your weight loss and gain is from what you eat. Use small plates only Drink 2 liters (1/2 gallon) of water every day HALF of every meal should be fruit or vegetables Try meal prepping on Sunday (or your day off) with new different vegetables. Consider meal prep service such as [employer], wepremeals. com Replace soda with diet soda or other zero sugar drinks (selter water just fine) Consider using the [employer] opal for calorie counting. Goal [id number] calories per day Activity Staying physically active will help you lose more weight and can help you get over the plateau when you weight just 
won't change any more with diet. Start exercise at least 5 days per week for 40 minutes. Consider [employer] training opal for home exercises. You can start with walking. I suggest walking at a speed of at least 3.5-4.5mph to for the weight loss benefit. Increase your speed or distance every 2 weeks. Do some slow stretching daily of legs, arms and back. Consider adding weight training with light weights at home or at the gym. See a doctor or a physical training for 
instructions in order to avoid injuries from doing muscle training incorrectly.  
Free fitness program in [place]: [email or web address] 
 
 
  

## 2020-12-14 NOTE — PROGRESS NOTES
[facility] Note      Subjective:     Chief Complaint   Patient presents with    Diabetes     4 week follow up    Weight Management     4 week follow up     Melida Sarmiento is a 35y.o. year old female who presents for evaluation of the following:      Diabetes Mellitus Type II:  Chronic. Diagnosed 2020  Home monitoring: No log in office, 130s from memory  Treatment:   Key Antihyperglycemic Medications             metFORMIN (GLUCOPHAGE) 1,000 mg tablet (Taking) Take 1 Tab by mouth two (2) times daily (with meals). liraglutide (VICTOZA) 0.6 mg/0.1 mL (18 mg/3 mL) pnij (Taking) Take 0.6 mg subcutaneously once daily for 1 week, then increase to 1.2 mg once daily      Not adhering to diabetic diet  Complications: None  Co-morbidities: Obesity  Managed by PCP  Lab Results   Component Value Date/Time    Hemoglobin A1c 7.0 (H) 09/08/2020 11:05 AM     Lab Results   Component Value Date/Time    Creatinine 0.58 09/08/2020 11:05 AM       Hypertension with DMII:  Chronic. Home monitoring: None  Treatment:   Key CAD CHF Meds             amLODIPine (NORVASC) 10 mg tablet (Taking) Take 1 Tab by mouth daily. Indications: high blood pressure    lisinopriL (PRINIVIL, ZESTRIL) 20 mg tablet (Taking) Take 1 Tab by mouth daily. chlorthalidone (HYGROTEN) 50 mg tablet (Taking) Take 1 Tab by mouth daily. Not adhering to strict low salt diet  - no table salt. Complications: None    Co-morbidities: Obesity  BP Readings from Last 3 Encounters:   12/14/20 137/78   11/13/20 118/80   10/27/20 115/80       Diuretic Induced Hypokalemia:   Taking thiazide diuretic  Lab Results   Component Value Date/Time    Potassium 3.4 (L) 09/08/2020 11:05 AM     Elevated Liver Enzymes:   Tx: None  Abdominal US 8/20202 normal liver  Lab Results   Component Value Date/Time    ALT (SGPT) 64 (H) 09/08/2020 11:05 AM    AST (SGOT) 43 (H) 09/08/2020 11:05 AM    Alk.  phosphatase 86 09/08/2020 11:05 AM Bilirubin, total 0.2 09/08/2020 11:05 AM       Anxiety:   Chornic  Mood: On edge  Current Sx: anxious, poor sleep, cant concentrate  - poor sleep maintenance, never had sleep apnea evaluation, gets 4hours of sleep at night  Triggers: Sister shot boyfriend  Therapy: Ronen Wren   [facility]             escitalopram oxalate (LEXAPRO) 20 mg tablet (Taking) Take 1 Tab by mouth daily. traZODone (DESYREL) 50 mg tablet (Taking) Take 1 Tab by mouth nightly. Indications: insomnia associated with depression    busPIRone (BUSPAR) 7.5 mg tablet (Taking) Take 1 Tab by mouth two (2) times a day. - feels trazodone not as effective as before  Previous Tx: melatonin, tylenol PM, ZZZquil  BILL: 18>18  PHQ: 14>17  3 most recent PHQ Screens 12/14/2020   Little interest or pleasure in doing things Nearly every day   Feeling down, depressed, irritable, or hopeless Not at all   Total Score PHQ 2 3   Trouble falling or staying asleep, or sleeping too much Nearly every day   Feeling tired or having little energy Nearly every day   Poor appetite, weight loss, or overeating Several days   Feeling bad about yourself - or that you are a failure or have let yourself or your family down Several days   Trouble concentrating on things such as school, work, reading, or watching TV Nearly every day   Moving or speaking so slowly that other people could have noticed; or the opposite being so fidgety that others notice Nearly every day   Thoughts of being better off dead, or hurting yourself in some way Not at all   PHQ 9 Score 17       Obesity:   Patient perception: losing weight  Diet: cutting down on soda  Activity: None   Treatment:  Key Obesity Meds             chlorthalidone (HYGROTEN) 50 mg tablet (Taking) Take 1 Tab by mouth daily. metFORMIN (GLUCOPHAGE) 1,000 mg tablet (Taking) Take 1 Tab by mouth two (2) times daily (with meals).     liraglutide (VICTOZA) 0.6 mg/0.1 mL (18 mg/3 mL) pnij (Taking) Take 0.6 mg subcutaneously once daily for 1 week, then increase to 1.2 mg once daily      Previous Treatment: None  Last Weight Metrics:  Weight Loss Metrics 2020 2020 10/27/2020 9/3/2020 2020 3/10/2020 2020   Today's Wt 439 lb 3.2 oz 448 lb 6.4 oz 441 lb 451 lb 9.6 oz 454 lb 452 lb 447 lb 8 oz   BMI 68.79 kg/m2 70.23 kg/m2 69.07 kg/m2 70.73 kg/m2 71.11 kg/m2 70.79 kg/m2 70.09 kg/m2       Acute Concern:   Leg Numbness  Onset 1-2 months ago  Occasional, not daily  Numbness goes down fulll leg length  Deneis fall, injury, rash    Constipation   Onset after starting victoza  BM 3 times per week  Denies abdominal pain, blood in stools    PCP is Angela      Review of Systems   Pertinent positives and negative per HPI. All other systems  reviewed are negative for a Comprehensive ROS (10+).        Past Medical History:   Diagnosis Date    Anxiety and depression     anxiety    Asthma     Atrial fibrillation (HCC)     Chondromalacia of patella, left     Enlarged heart     Heart murmur     Hypertension     Knee pain, bilateral     Mixed hyperlipidemia     Morbid obesity (HCC)     PCOS (polycystic ovarian syndrome)     Pre-diabetes         Social History     Socioeconomic History    Marital status:      Spouse name: Not on file    Number of children: Not on file    Years of education: Not on file    Highest education level: Not on file   Occupational History    Occupation: security     Comment: 3p-midnight    Social Needs    Financial resource strain: Not on file    Food insecurity     Worry: Not on file     Inability: Not on file   [employer] needs     Medical: Not on file     Non-medical: Not on file   Tobacco Use    Smoking status: Former Smoker     Types: Cigarettes     Last attempt to quit: 2018     Years since quittin.9    Smokeless tobacco: Never Used   Substance and Sexual Activity    Alcohol use: No    Drug use: No    Sexual activity: Yes     Partners: Male     Birth control/protection: Pill   Lifestyle    Physical activity     Days per week: Not on file     Minutes per session: Not on file    Stress: Not on file   Relationships    Social connections     Talks on phone: Not on file     Gets together: Not on file     Attends Jain service: Not on file     Active member of club or organization: Not on file     Attends meetings of clubs or organizations: Not on file     Relationship status: Not on file    Intimate partner violence     Fear of current or ex partner: Not on file     Emotionally abused: Not on file     Physically abused: Not on file     Forced sexual activity: Not on file   Other Topics Concern    Not on file   Social History Narrative    In the home with mora and his mother        Family History   Problem Relation Age of Onset    Mental Retardation Mother     Psychiatric Disorder Mother     Diabetes Maternal Grandmother     Heart Disease Maternal Grandmother     Hypertension Maternal Grandmother     Stroke Maternal Grandmother        Current Outpatient Medications   Medication Sig    amLODIPine (NORVASC) 10 mg tablet Take 1 Tab by mouth daily. Indications: high blood pressure (Patient taking differently: Take 10 mg by mouth daily. Indications: high blood pressure)    lisinopriL (PRINIVIL, ZESTRIL) 20 mg tablet Take 1 Tab by mouth daily.  potassium chloride SR (KLOR-CON 10) 10 mEq tablet Take 1 Tab by mouth two (2) times a day.  chlorthalidone (HYGROTEN) 50 mg tablet Take 1 Tab by mouth daily.  montelukast (SINGULAIR) 10 mg tablet TAKE 1 TAB BY MOUTH DAILY. INDICATIONS: CONTROLLER MEDICATION FOR ASTHMA (Patient taking differently: Take 10 mg by mouth daily. Indications: controller medication for asthma)    escitalopram oxalate (LEXAPRO) 20 mg tablet Take 1 Tab by mouth daily.  metFORMIN (GLUCOPHAGE) 1,000 mg tablet Take 1 Tab by mouth two (2) times daily (with meals).  etonogestreL (Nexplanon) 68 mg impl by SubDERmal route.     liraglutide (VICTOZA) 0.6 mg/0.1 mL (18 mg/3 mL) pnij Take 0.6 mg subcutaneously once daily for 1 week, then increase to 1.2 mg once daily    Insulin Needles, Disposable, 31 gauge x 5/16\" ndle Use daily with victoza    traZODone (DESYREL) 50 mg tablet Take 1 Tab by mouth nightly. Indications: insomnia associated with depression    busPIRone (BUSPAR) 7.5 mg tablet Take 1 Tab by mouth two (2) times a day.  albuterol (PROVENTIL VENTOLIN) 2.5 mg /3 mL (0.083 %) nebu USE 1 VIAL IN NEBULIZAER EVERY 4 HOURS AS NEEDED FOR SHORTNESS OF BREATH OR COUGH    albuterol (PROVENTIL HFA, VENTOLIN HFA, PROAIR HFA) 90 mcg/actuation inhaler Take 1 Puff by inhalation every four (4) hours as needed for Wheezing. Shake well before each inhalation. (Patient taking differently: Take 1 Puff by inhalation every four (4) hours as needed for Wheezing. Shake well before each inhalation.)    esomeprazole (NEXIUM) 20 mg capsule Take  by mouth daily.  multivitamin (ONE A DAY) tablet Take 1 Tab by mouth daily.  cyanocobalamin, vitamin B-12, (VITAMIN B12 PO) Take  by mouth.  ascorbic acid, vitamin C, (VITAMIN C) 500 mg tablet Take 1,000 mg by mouth.  amoxicillin-clavulanate (AUGMENTIN) 875-125 mg per tablet TAKE 1 TABLET BY MOUTH EVERY 12 HOURS FOR 7 DAYS    esomeprazole magnesium 20 mg TbEC Take  by mouth daily.  cholecalciferol (VITAMIN D3) 25 mcg (1,000 unit) cap Take  by mouth daily. No current facility-administered medications for this visit. Objective:     Vitals:    12/14/20 1005   BP: 137/78   Pulse: 96   Resp: 18   Temp: 98.6 °F (37 °C)   TempSrc: Oral   SpO2: 99%   Weight: (!) 439 lb 3.2 oz (199.2 kg)   Height: 5' 7\" (1.702 m)       Physical Examination:  General: Alert, cooperative, no distress, appears stated age. Obese  Eyes: Conjunctivae clear. Pupils equally round and reactive to light, Extraocular muscles intact. Ears: Normal external ear canals both ears. Nose: Nares normal. Septum midline. Mucosa normal. No drainage or sinus tenderness. Mouth/Throat: Lips, mucosa, and tongue normal. No oropharyngeal erythema. No tonsillar enlargement or exudate. Neck: Supple, symmetrical, trachea midline, no adenopathy. No thyroid enlargement/tenderness/nodules  Respiratory: Breathing comfortably, in no acute respiratory distress. Clear to auscultation bilaterally. Normal inspiratory and expiratory ratio. Cardiovascular: Regular rate and rhythm, S1, S2 normal, no murmur, click, rub or gallop.   -Extremities no edema. Pulses 2+ and symmetric radial and dorsalis pedis   Abdomen: Soft, non-tender, not distended. Bowel sounds normal. No masses or organomegaly. MSK: Extremities normal appearing, atraumatic, no effusion. Gait steady and unassisted. Back symmetric, no curvature. Range of motion normal. No Costovertebral angle tenderness. Skin: Skin color, texture, turgor normal. No rashes or lesions on exposed skin. Lymph nodes: Cervical, supraclavicular nodes normal.  Neurologic: Cranial nerves II-XII intact. Strength 5/5 grossly. Sensation and reflexes normal throughout. Psychiatric: Affect appropriate. Mood euthymic. Thoughts logical. Speech volume and speed normal    Diabetic foot exam:   Left: Reflexes 2+     Vibratory sensation normal    Proprioception normal   Sharp/dull discrimination normal    Filament test normal sensation with micro filament  Right: Reflexes 2+   Vibratory sensation normal   Proprioception normal   Sharp/dull discrimination normal   Filament test normal sensation with micro filament      No visits with results within 3 Month(s) from this visit.    Latest known visit with results is:   Office Visit on 09/03/2020   Component Date Value Ref Range Status    Glucose 09/08/2020 111* 65 - 99 mg/dL Final    BUN 09/08/2020 12  6 - 20 mg/dL Final    Creatinine 09/08/2020 0.58  0.57 - 1.00 mg/dL Final    GFR est non-AA 09/08/2020 125  >59 mL/min/1.73 Final    GFR est AA 09/08/2020 144  >59 mL/min/1.73 Final    BUN/Creatinine ratio 09/08/2020 21  9 - 23 Final    Sodium 09/08/2020 136  134 - 144 mmol/L Final    Potassium 09/08/2020 3.4* 3.5 - 5.2 mmol/L Final    Chloride 09/08/2020 98  96 - 106 mmol/L Final    CO2 09/08/2020 24  20 - 29 mmol/L Final    Calcium 09/08/2020 9.1  8.7 - 10.2 mg/dL Final    Protein, total 09/08/2020 6.7  6.0 - 8.5 g/dL Final    Albumin 09/08/2020 3.8* 3.9 - 5.0 g/dL Final    GLOBULIN, TOTAL 09/08/2020 2.9  1.5 - 4.5 g/dL Final    A-G Ratio 09/08/2020 1.3  1.2 - 2.2 Final    Bilirubin, total 09/08/2020 0.2  0.0 - 1.2 mg/dL Final    Alk. phosphatase 09/08/2020 86  39 - 117 IU/L Final    AST (SGOT) 09/08/2020 43* 0 - 40 IU/L Final    ALT (SGPT) 09/08/2020 64* 0 - 32 IU/L Final    Cholesterol, total 09/08/2020 152  100 - 199 mg/dL Final    Triglyceride 09/08/2020 128  0 - 149 mg/dL Final    HDL Cholesterol 09/08/2020 60  >39 mg/dL Final    VLDL Cholesterol Gabe 09/08/2020 22  5 - 40 mg/dL Final    LDL Chol Calc (NIH) 09/08/2020 70  0 - 99 mg/dL Final    Hemoglobin A1c 09/08/2020 7.0* 4.8 - 5.6 % Final    Comment:          Prediabetes: 5.7 - 6.4           Diabetes: >6.4           Glycemic control for adults with diabetes: <7.0      Estimated average glucose 09/08/2020 154  mg/dL Final    Hep B surface Ag screen 09/08/2020 Negative  Negative Final    Hepatitis Be Antigen 09/08/2020 Negative  Negative Final    Hep B Core Ab, IgM 09/08/2020 Negative  Negative Final    Hep B Core Ab, total 09/08/2020 Negative  Negative Final    Hepatitis Be Antibody 09/08/2020 Negative  Negative Final    HEP B SURFACE AB, QUAL 09/08/2020 Reactive   Final    Comment:               Non Reactive:  Inconsistent with immunity,                              less than 10 mIU/mL                Reactive:     Consistent with immunity,                              greater than 9.9 mIU/mL      HCV Ab 09/08/2020 <0.1  0.0 - 0.9 s/co ratio Final    WBC 09/08/2020 12.6* 3.4 - 10.8 x10E3/uL Final    RBC 09/08/2020 4.19  3.77 - 5.28 x10E6/uL Final    HGB 09/08/2020 13.6  11.1 - 15.9 g/dL Final    HCT 09/08/2020 38.8  34.0 - 46.6 % Final    MCV 09/08/2020 93  79 - 97 fL Final    MCH 09/08/2020 32.5  26.6 - 33.0 pg Final    MCHC 09/08/2020 35.1  31.5 - 35.7 g/dL Final    RDW 09/08/2020 13.1  11.7 - 15.4 % Final    PLATELET 64/61/6371 661  150 - 450 x10E3/uL Final    NEUTROPHILS 09/08/2020 77  Not Estab. % Final    Lymphocytes 09/08/2020 15  Not Estab. % Final    MONOCYTES 09/08/2020 7  Not Estab. % Final    EOSINOPHILS 09/08/2020 1  Not Estab. % Final    BASOPHILS 09/08/2020 0  Not Estab. % Final    ABS. NEUTROPHILS 09/08/2020 9.6* 1.4 - 7.0 x10E3/uL Final    Abs Lymphocytes 09/08/2020 1.9  0.7 - 3.1 x10E3/uL Final    ABS. MONOCYTES 09/08/2020 0.9  0.1 - 0.9 x10E3/uL Final    ABS. EOSINOPHILS 09/08/2020 0.2  0.0 - 0.4 x10E3/uL Final    ABS. BASOPHILS 09/08/2020 0.1  0.0 - 0.2 x10E3/uL Final    IMMATURE GRANULOCYTES 09/08/2020 0  Not Estab. % Final    ABS. IMM. GRANS. 09/08/2020 0.0  0.0 - 0.1 x10E3/uL Final    Comment 09/08/2020 Comment   Final    Comment: Non reactive HCV antibody screen is consistent with no HCV infection,  unless recent infection is suspected or other evidence exists to  indicate HCV infection.  INTERPRETATION 09/08/2020 Note   Final    Supplemental report is available. Assessment/ Plan:   Diagnoses and all orders for this visit:    1. Controlled type 2 diabetes mellitus without complication, without long-term current use of insulin (HCC)  -     HEMOGLOBIN A1C WITH EAG; Future  -     METABOLIC PANEL, COMPREHENSIVE; Future  -     CBC WITH AUTOMATED DIFF; Future  -      DIABETES EDUCATION  -     MICROALBUMIN, UR, RAND W/ MICROALB/CREAT RATIO; Future  -      DIABETES FOOT EXAM    2. Hypertension associated [facility] ([facility]    3. Anxiety  -     traZODone (DESYREL) 100 mg tablet; Take 1 Tab by mouth nightly.  Indications: insomnia associated with depression    4. Depression, unspecified depression type  -     traZODone (DESYREL) 100 mg tablet; Take 1 Tab by mouth nightly. Indications: insomnia associated with depression    5. Hypokalemia  -     METABOLIC PANEL, COMPREHENSIVE; Future    6. Sleep disorder  -     traZODone (DESYREL) 100 mg tablet; Take 1 Tab by mouth nightly. Indications: insomnia associated with depression    7. Poor sleep  -     REFERRAL TO SLEEP STUDIES    8. At risk for obstructive sleep apnea  -     REFERRAL TO SLEEP STUDIES    9. Obesity, morbid ([address]    10. Leg numbness    11. Left leg numbness  -     XR SPINE LUMB 2 OR 3 V; Future    12. Leukocytosis, unspecified type  -     CBC WITH AUTOMATED DIFF; Future    13. Constipation, unspecified constipation type      For today's visit, I did the following:  · Reviewed PMH as listed in orders  · Refilled meds for chronic conditions, per orders  · Reviewed labs in detail or ordered lab  Labs to eval end organ function and etiology of chronic/acute concerns. Diabetes. Labs to monitor. Continue current regimen. Blood pessure is well controlled. Continue current regimen. DASH Diet recommended. Hypokalemia likely diuretic induced. Continue potassium supplement. Monitor with labs. Constipation, mild  Start stool softener. Sleep difficulty concerning for PRISCILA, refferal for sleep study. Increase trazodone for now. Depression with anxiety uncontrolled. Increase trazodone. On the basis of positive PHQ-9 screening (PHQ 9 Score: 17), patient instructed to schedule follow-up visit at this practice and medication was prescribed, drug therapy education given. Encouraged counseling for mood disorder. Follow up with specialists per routine       Educated patient on red flag symptoms to warrant return to clinic or emergency room visit. I have discussed the diagnosis with the patient and the intended plan as seen in the above orders.   The patient has been offered or received an after-visit summary and questions were answered concerning future plans. I have discussed medication side effects and warnings with the patient as well. Follow-up and Dispositions    · Return in about 3 months (around 3/14/2021) for Follow Up diabetes.        Signed,    Liz Aguirre MD  12/14/2020

## 2020-12-15 ENCOUNTER — TELEPHONE (OUTPATIENT)
Dept: FAMILY MEDICINE CLINIC | Age: 30
End: 2020-12-15

## 2020-12-15 DIAGNOSIS — J45.40 MODERATE PERSISTENT ASTHMA WITHOUT COMPLICATION: ICD-10-CM

## 2020-12-15 RX ORDER — BUSPIRONE HYDROCHLORIDE 7.5 MG/1
TABLET ORAL
Qty: 180 TAB | Refills: 0 | Status: SHIPPED | OUTPATIENT
Start: 2020-12-15 | End: 2021-02-26 | Stop reason: SDUPTHER

## 2020-12-15 RX ORDER — ALBUTEROL SULFATE 0.83 MG/ML
SOLUTION RESPIRATORY (INHALATION)
Qty: 75 ML | Refills: 3 | Status: SHIPPED | OUTPATIENT
Start: 2020-12-15 | End: 2021-03-24 | Stop reason: SDUPTHER

## 2020-12-15 NOTE — TELEPHONE ENCOUNTER
NP Cynthea Cheadle,    Patient requesting refill of albuterol soln per pharmacy.   virginie Rudd    Last Visit: yesterday- MD Desmond Diego  Next Appointment: 3/16/21  MD Desmond Diego  Previous Refill Encounter(s): 9/11/20  75ml    Requested Prescriptions     Pending Prescriptions Disp Refills    albuterol (PROVENTIL VENTOLIN) 2.5 mg /3 mL (0.083 %) nebu 75 mL 0     Sig: USE 1 VIAL IN NEBULIZAER EVERY 4 HOURS AS NEEDED FOR SHORTNESS OF BREATH OR COUGH

## 2020-12-15 NOTE — TELEPHONE ENCOUNTER
Patient called regarding results of lab work that was done on yesterday. Requesting a call back.     Best call back # [phone]

## 2020-12-16 ENCOUNTER — TELEPHONE (OUTPATIENT)
Dept: FAMILY MEDICINE CLINIC | Age: 30
End: 2020-12-16

## 2020-12-16 NOTE — TELEPHONE ENCOUNTER
Pt is calling in regards to her lab results and would like a call back to discuss.       [facility]# [phone]

## 2020-12-18 DIAGNOSIS — E87.1 HYPONATREMIA: Primary | ICD-10-CM

## 2020-12-18 NOTE — TELEPHONE ENCOUNTER
Pt called requesting second opinion on her lab results. Pt stated she would like to speak with a different provider.  Specifically pt has a question about her sodium level      [facility]# [phone]

## 2021-01-29 DIAGNOSIS — E11.9 CONTROLLED TYPE 2 DIABETES MELLITUS WITHOUT COMPLICATION, WITHOUT LONG-TERM CURRENT USE OF INSULIN (HCC): ICD-10-CM

## 2021-01-29 RX ORDER — METFORMIN HYDROCHLORIDE 1000 MG/1
1000 TABLET ORAL 2 TIMES DAILY WITH MEALS
Qty: 180 TAB | Refills: 0 | Status: SHIPPED | OUTPATIENT
Start: 2021-01-29 | End: 2021-04-13 | Stop reason: SDUPTHER

## 2021-02-04 ENCOUNTER — LAB ONLY (OUTPATIENT)
Dept: FAMILY MEDICINE CLINIC | Age: 31
End: 2021-02-04

## 2021-02-04 DIAGNOSIS — E87.1 HYPONATREMIA: ICD-10-CM

## 2021-02-04 NOTE — ADDENDUM NOTE
Addended by: Evan Vidal on: 2/4/2021 09:27 AM     Modules accepted: Orders Atrial fibrillation    Chronic bronchitis    COPD (chronic obstructive pulmonary disease)    GERD (gastroesophageal reflux disease)    Hypertension    Mitral valve prolapse    Osteoarthritis    Sleep apnea

## 2021-02-05 LAB
ANION GAP SERPL CALC-SCNC: 10 MMOL/L (ref 5–15)
BUN SERPL-MCNC: 15 MG/DL (ref 6–20)
BUN/CREAT SERPL: 21 (ref 12–20)
CALCIUM SERPL-MCNC: 9.5 MG/DL (ref 8.5–10.1)
CHLORIDE SERPL-SCNC: 105 MMOL/L (ref 97–108)
CO2 SERPL-SCNC: 23 MMOL/L (ref 21–32)
CREAT SERPL-MCNC: 0.7 MG/DL (ref 0.55–1.02)
GLUCOSE SERPL-MCNC: 92 MG/DL (ref 65–100)
POTASSIUM SERPL-SCNC: 4.3 MMOL/L (ref 3.5–5.1)
SODIUM SERPL-SCNC: 138 MMOL/L (ref 136–145)

## 2021-02-09 ENCOUNTER — HOSPITAL ENCOUNTER (OUTPATIENT)
Dept: GENERAL RADIOLOGY | Age: 31
Discharge: HOME | End: 2021-02-09
Payer: COMMERCIAL

## 2021-02-09 PROCEDURE — 72100 X-RAY EXAM L-S SPINE 2/3 VWS: CPT

## 2021-02-10 ENCOUNTER — OFFICE VISIT (OUTPATIENT)
Dept: FAMILY MEDICINE CLINIC | Age: 31
End: 2021-02-10
Payer: COMMERCIAL

## 2021-02-10 VITALS
DIASTOLIC BLOOD PRESSURE: 75 MMHG | HEIGHT: 67 IN | BODY MASS INDEX: 45.99 KG/M2 | WEIGHT: 293 LBS | HEART RATE: 92 BPM | RESPIRATION RATE: 20 BRPM | TEMPERATURE: 98.2 F | SYSTOLIC BLOOD PRESSURE: 129 MMHG | OXYGEN SATURATION: 99 %

## 2021-02-10 DIAGNOSIS — E66.01 MORBID OBESITY (HCC): ICD-10-CM

## 2021-02-10 DIAGNOSIS — K21.9 GASTROESOPHAGEAL REFLUX DISEASE, UNSPECIFIED WHETHER ESOPHAGITIS PRESENT: Primary | ICD-10-CM

## 2021-02-10 DIAGNOSIS — R10.11 RUQ ABDOMINAL PAIN: ICD-10-CM

## 2021-02-10 PROCEDURE — 99214 OFFICE O/P EST MOD 30 MIN: CPT | Performed by: FAMILY MEDICINE

## 2021-02-10 RX ORDER — OMEPRAZOLE 40 MG/1
40 CAPSULE, DELAYED RELEASE ORAL
Qty: 90 CAP | Refills: 0 | Status: SHIPPED | OUTPATIENT
Start: 2021-02-10 | End: 2021-04-17 | Stop reason: SDUPTHER

## 2021-02-10 NOTE — PROGRESS NOTES
Chief Complaint   Patient presents with    Heartburn     x years but increased x 1 week    Abdominal Pain     under ribs boths sides, R>L       HISTORY OF PRESENT ILLNESS   HPI  Morbidly obese Type 2 Diabetic presents w/ complaints of increased heartburn and acid reflux over the past week. She reports h/o reflux x several years but it has recently worsened. Feels like burning in chest and occ into throat at night when lying down. She has been taking Nexium OTC daily the past few years. But lately she has also been \"eating Rolaids like candy\". Also the past few months she has had some intermittent pain in her abdomen under ribs mostly on the right side. Occasional nausea. No vomiting. Denies cough, sore throat, hoarseness, dysphagia, diarrhea, constipation, blood in stool, melena, early satiety, unintentional wt loss, fevers, chills. She takes [facility] [employer] powder daily because of chronic joint pain. Diet is poor. Drinks 2 sodas a day. No coffee or tea. Endorses significant family h/o her mother, sister and MGM all had their gallbladders removed. REVIEW OF SYMPTOMS   Review of Systems   Constitutional: Negative. Negative for chills, fever, malaise/fatigue and weight loss. Respiratory: Negative for cough and shortness of breath. Cardiovascular: Negative for chest pain. Gastrointestinal: Positive for heartburn and nausea. Negative for abdominal pain, blood in stool, constipation, diarrhea and melena. Genitourinary: Negative. Endo/Heme/Allergies: Negative.             PROBLEM LIST/MEDICAL HISTORY     Problem List  Date Reviewed: 2/10/2021          Codes Class Noted    Hypertension associated with diabetes [facility]) ICD-10-CM: E11.59, I10  ICD-9-CM: 250.80, 401.9  12/14/2020        Controlled type 2 diabetes mellitus without complication, without long-term current use of insulin (HCC) ICD-10-CM: E11.9  ICD-9-CM: 250.00  12/14/2020        Sleep disorder ICD-10-CM: G47.9  ICD-9-CM: 780.50  2/11/2020 Overview Signed 2020 10:16 AM by Anni Pablo NP     (-) PRISCILA, poor sleep              [facility] ([facility] ICD-10-CM: E66.01  ICD-9-CM: 278.01  2019        Anxiety ICD-10-CM: F41.9  ICD-9-CM: 300.00  2011        Depression ICD-10-CM: F32.9  ICD-9-CM: 592  2011            OB History        0    Para   0    Term   0       0    AB   0    Living   0       SAB   0    TAB   0    Ectopic   0    Molar   0    Multiple   0    Live Births   0                    PAST SURGICAL HISTORY     Past Surgical History:   Procedure Laterality Date    HX ORTHOPAEDIC Bilateral     as child knees    MD CARDIAC SURG PROCEDURE UNLIST  age 4    2 heart valves \"sewn up\" as an infant         MEDICATIONS     Current Outpatient Medications   Medication Sig    metFORMIN (GLUCOPHAGE) 1,000 mg tablet Take 1 Tab by mouth two (2) times daily (with meals).  liraglutide (Victoza 3-Reyes) 0.6 mg/0.1 mL (18 mg/3 mL) pnij INJECT 0.6 MG SUBCUTANEOUSLY ONCE DAILY FOR 1 WEEK THEN INCREASE TO 1.2 MG ONCE DAILY    busPIRone (BUSPAR) 7.5 mg tablet TAKE 1 TABLET BY MOUTH TWICE A DAY    albuterol (PROVENTIL VENTOLIN) 2.5 mg /3 mL (0.083 %) nebu USE 1 VIAL IN NEBULIZAER EVERY 4 HOURS AS NEEDED FOR SHORTNESS OF BREATH OR COUGH    traZODone (DESYREL) 100 mg tablet Take 1 Tab by mouth nightly. Indications: insomnia associated with depression    amLODIPine (NORVASC) 10 mg tablet Take 1 Tab by mouth daily. Indications: high blood pressure (Patient taking differently: Take 10 mg by mouth daily. Indications: high blood pressure)    lisinopriL (PRINIVIL, ZESTRIL) 20 mg tablet Take 1 Tab by mouth daily.  potassium chloride SR (KLOR-CON 10) 10 mEq tablet Take 1 Tab by mouth two (2) times a day.  chlorthalidone (HYGROTEN) 50 mg tablet Take 1 Tab by mouth daily.  escitalopram oxalate (LEXAPRO) 20 mg tablet Take 1 Tab by mouth daily.  etonogestreL (Nexplanon) 68 mg impl by SubDERmal route.     Insulin Needles, Disposable, 31 gauge x 5/16\" ndle Use daily with victoza    albuterol (PROVENTIL HFA, VENTOLIN HFA, PROAIR HFA) 90 mcg/actuation inhaler Take 1 Puff by inhalation every four (4) hours as needed for Wheezing. Shake well before each inhalation. (Patient taking differently: Take 1 Puff by inhalation every four (4) hours as needed for Wheezing. Shake well before each inhalation.)    esomeprazole (NEXIUM) 20 mg capsule Take  by mouth daily.  cholecalciferol (VITAMIN D3) 25 mcg (1,000 unit) cap Take  by mouth daily.  multivitamin (ONE A DAY) tablet Take 1 Tab by mouth daily.  ascorbic acid, vitamin C, (VITAMIN C) 500 mg tablet Take 1,000 mg by mouth.  montelukast (SINGULAIR) 10 mg tablet TAKE 1 TAB BY MOUTH DAILY. INDICATIONS: CONTROLLER MEDICATION FOR ASTHMA (Patient taking differently: Take 10 mg by mouth daily. Indications: controller medication for asthma)    cyanocobalamin, vitamin B-12, (VITAMIN B12 PO) Take  by mouth. No current facility-administered medications for this visit.            ALLERGIES     Allergies   Allergen Reactions    Ibuprofen Nausea and Vomiting    Singulair [Montelukast] Other (comments)     Difficulty breathing          SOCIAL HISTORY     Social History     Tobacco Use    Smoking status: Current Every Day Smoker     Packs/day: 0.50     Types: Cigarettes     Last attempt to quit: 2018     Years since quitting: 3.1    Smokeless tobacco: Never Used   Substance Use Topics    Alcohol use: No     Comment: none     Social History     Social History Narrative    Single, no children    Lives at home with mora and his mother     Works as a  in [place]    Caffeine: 2 sodas a day     Social History     Substance and Sexual Activity   Sexual Activity Yes    Partners: Male    Birth control/protection: Implant    Comment: Nexplanon       IMMUNIZATIONS     Immunization History   Administered Date(s) Administered    Influenza Vaccine (Quad) PF (>6 Mo Flulaval, Fluarix, and >3 Yrs Afluria, Fluzone 15780) 08/23/2019, 09/03/2020    Pneumococcal Conjugate (PCV-13) 10/06/2018    Td 04/02/2013    Td, Adsorbed 08/23/2019         FAMILY HISTORY     Family History   Problem Relation Age of Onset    Mental Retardation Mother     Psychiatric Disorder Mother    Nai Long Bladder Disease Mother     Diabetes Maternal Grandmother     Heart Disease Maternal Grandmother     Hypertension Maternal Grandmother     Stroke Maternal Grandmother     Gall Bladder Disease Maternal Grandmother     Gall Bladder Disease Sister          VITALS     Visit Vitals  /75 (BP 1 Location: Left upper arm, BP Patient Position: Sitting, BP Cuff Size: Adult)   Pulse 92   Temp 98.2 °F (36.8 °C) (Temporal)   Resp 20   Ht 5' 7\" (1.702 m)   Wt (!) 432 lb 9.6 oz (196.2 kg)   SpO2 99%   BMI 67.75 kg/m²          PHYSICAL EXAMINATION   Physical Exam  Vitals signs reviewed. Constitutional:       General: She is not in acute distress. Appearance: She is not ill-appearing. Cardiovascular:      Rate and Rhythm: Normal rate and regular rhythm. Pulmonary:      Effort: Pulmonary effort is normal.      Breath sounds: Normal breath sounds. Abdominal:      Palpations: Abdomen is soft. Tenderness: There is abdominal tenderness in the right upper quadrant. There is no guarding or rebound. Negative signs include Tian's sign. Comments: Obese     Skin:     General: Skin is warm and dry.                 LABORATORY DATA/ANCILLARY/IMAGING     Results for orders placed or performed in visit on 40/01/68   METABOLIC PANEL, BASIC   Result Value Ref Range    Sodium 138 136 - 145 mmol/L    Potassium 4.3 3.5 - 5.1 mmol/L    Chloride 105 97 - 108 mmol/L    CO2 23 21 - 32 mmol/L    Anion gap 10 5 - 15 mmol/L    Glucose 92 65 - 100 mg/dL    BUN 15 6 - 20 MG/DL    Creatinine 0.70 0.55 - 1.02 MG/DL    BUN/Creatinine ratio 21 (H) 12 - 20      GFR est AA >60 >60 ml/min/1.73m2    GFR est non-AA >60 >60 ml/min/1.73m2    Calcium 9.5 8.5 - 10.1 MG/DL         Lab Results   Component Value Date/Time    ALT (SGPT) 32 12/14/2020 11:08 AM    Alk. phosphatase 107 12/14/2020 11:08 AM    Bilirubin, total 0.3 12/14/2020 11:08 AM    Albumin 3.8 12/14/2020 11:08 AM    Protein, total 7.8 12/14/2020 11:08 AM    PLATELET 440 75/17/1328 11:08 AM         ASSESSMENT & PLAN       ICD-10-CM ICD-9-CM    1. Gastroesophageal reflux disease, unspecified whether esophagitis present  K21.9 530.81 omeprazole (PRILOSEC) 40 mg capsule   2. RUQ abdominal pain  R10.11 789.01 US ABD COMP   3. Morbid obesity ([facility]  E66.01 278.01      Check Abd US. Suspect gallbladder disease  Replace OTC Nexium w/ Rx Prilosec 40 mg once daily for now  [facility] Powder and avoid all asa/nsaids. May use Tylenol Arthritis bid-tid for joint pain  Reviewed medications, effects and potential side effects   Reflux precautions and home care instructions r/w pt at length today  Decrease caffeine consumption and other dietary triggers  Smoking cessation  Work on wt reduction  Further follow up and recommendations pending review of US as well  Patient counseled about diagnosis, assessment, management options, current recommended treatment plan, expectant course, worsening signs & symptoms w/ instructions for appropriate follow up. Call back sooner for worsening symptoms or failure to improve w/in expectant course. ER evaluation for any severe/new/concerning symptoms as discussed in detail in office today. Patient expresses understanding and agreement with plan of care.

## 2021-02-10 NOTE — PROGRESS NOTES
Chief Complaint   Patient presents with    Heartburn     x1 week    Rib Pain     both sides for a few months     1. Have you been to the ER, urgent care clinic since your last visit? Hospitalized since your last visit? No    2. Have you seen or consulted any other health care providers outside of the [address] [place] [address] since your last visit? Include any pap smears or colon screening.   No

## 2021-02-10 NOTE — PATIENT INSTRUCTIONS
Gastroesophageal Reflux Disease (GERD): Care Instructions 
Overview 
  
Gastroesophageal reflux disease (GERD) is the backward flow of stomach acid into the esophagus. The esophagus is the tube that leads from your throat to your stomach. A one-way valve prevents the stomach acid from backing up into this tube. But when you have GERD, this valve does not close tightly enough. This can also cause pain and swelling in your esophagus. (This is called esophagitis.) 
If you have mild GERD symptoms including heartburn, you may be able to control the problem with antacids or over-the-counter medicine. You can also make lifestyle changes to help reduce your symptoms. These include changing your diet and eating habits, such as not eating late at night and losing weight. 
Follow-up care is a key part of your treatment and safety. Be sure to make and go to all appointments, and call your doctor if you are having problems. It's also a good idea to know your test results and keep a list of the medicines you take. 
How can you care for yourself at home? 
· Take your medicines exactly as prescribed. Call your doctor if you think you are having a problem with your medicine. 
· Your doctor may recommend over-the-counter medicine. For mild or occasional indigestion, antacids, such as Tums, Gaviscon, Mylanta, or Maalox, may help. Your doctor also may recommend over-the-counter acid reducers, such as famotidine (Pepcid AC), cimetidine (Tagamet HB), or omeprazole (Prilosec). Read and follow all instructions on the label. If you use these medicines often, talk with your doctor. 
· Change your eating habits. 
? It's best to eat several small meals instead of two or three large meals. 
? After you eat, wait 2 to 3 hours before you lie down. 
? Chocolate, mint, and alcohol can make GERD worse. 
 ? Spicy foods, foods that have a lot of acid (like tomatoes and oranges), and coffee can make GERD symptoms worse in some people. If your symptoms are worse after you eat a certain food, you may want to stop eating that food to see if your symptoms get better. · Do not smoke or chew tobacco. Smoking can make GERD worse. If you need help quitting, talk to your doctor about stop-smoking programs and medicines. These can increase your chances of quitting for good. · If you have GERD symptoms at night, raise the head of your bed 6 to 8 inches by putting the frame on blocks or placing a foam wedge under the head of your mattress. (Adding extra pillows does not work.) · Do not wear tight clothing around your middle. · Lose weight if you need to. Losing just 5 to 10 pounds can help. When should you call for help? Call your doctor now or seek immediate medical care if: 
  · You have new or different belly pain.  
  · Your stools are black and tarlike or have streaks of blood. Watch closely for changes in your health, and be sure to contact your doctor if: 
  · Your symptoms have not improved after 2 days.  
  · Food seems to catch in your throat or chest.  
Where can you learn more? Go to http://www.gray.com/ Enter W775 in the search box to learn more about \"Gastroesophageal Reflux Disease (GERD): Care Instructions. \" Current as of: April 15, 2020               Content Version: 12.6 © 7962-1871 [employer]. Care instructions adapted under license by [employer] (which disclaims liability or warranty for this information). If you have questions about a medical condition or this instruction, always ask your healthcare professional. [place] [address] any warranty or liability for your use of this information.

## 2021-02-12 ENCOUNTER — TELEPHONE (OUTPATIENT)
Dept: FAMILY MEDICINE CLINIC | Age: 31
End: 2021-02-12

## 2021-02-12 ENCOUNTER — HOSPITAL ENCOUNTER (OUTPATIENT)
Dept: ULTRASOUND IMAGING | Age: 31
Discharge: HOME | End: 2021-02-12
Attending: FAMILY MEDICINE
Payer: COMMERCIAL

## 2021-02-12 DIAGNOSIS — R10.11 RUQ ABDOMINAL PAIN: ICD-10-CM

## 2021-02-12 PROCEDURE — 76700 US EXAM ABDOM COMPLETE: CPT

## 2021-02-12 NOTE — TELEPHONE ENCOUNTER
Advise pt her abdominal ultrasound was normal.  I would like for her to see GI for further evaluation. Please refer to [facility] or [facility] and advise to let us know when her appointment is set so that we can fax her records.

## 2021-02-14 NOTE — PROGRESS NOTES
Your back x-ray is normal.  Since your symptoms are currently occasional, keep a log of your symptoms every time they occur and other circumstances of your day such as your activity, the duration of symptoms, other symptoms experienced at that time. Bring this log to your primary care provider within the next 3 months if your symptoms are persistent.  [employer] result comment sent

## 2021-02-15 NOTE — TELEPHONE ENCOUNTER
Called, spoke to pt. Two pt identifiers confirmed. Writer informed patient of Ultrasound results and recommendation. Pt verbalized understanding of information discussed w/ no further questions at this time.

## 2021-02-23 DIAGNOSIS — E87.6 HYPOKALEMIA: ICD-10-CM

## 2021-02-25 RX ORDER — POTASSIUM CHLORIDE 750 MG/1
10 TABLET, FILM COATED, EXTENDED RELEASE ORAL 2 TIMES DAILY
Qty: 90 TAB | Refills: 1 | OUTPATIENT
Start: 2021-02-25

## 2021-02-26 DIAGNOSIS — F32.A ANXIETY AND DEPRESSION: ICD-10-CM

## 2021-02-26 DIAGNOSIS — F41.9 ANXIETY AND DEPRESSION: ICD-10-CM

## 2021-02-26 NOTE — TELEPHONE ENCOUNTER
Last visit 02/10/2021 MD Unruly Mcdonald   Next appointment 03/16/2021 MD Luz Garcia   Previous refill encounter(s)   12/15/2020 Buspar #180     Requested Prescriptions     Pending Prescriptions Disp Refills    busPIRone (BUSPAR) 7.5 mg tablet 180 Tab 0     Sig: Take 1 Tab by mouth two (2) times a day.

## 2021-02-28 RX ORDER — BUSPIRONE HYDROCHLORIDE 7.5 MG/1
7.5 TABLET ORAL 2 TIMES DAILY
Qty: 180 TAB | Refills: 0 | Status: SHIPPED | OUTPATIENT
Start: 2021-02-28 | End: 2021-06-01 | Stop reason: SDUPTHER

## 2021-03-16 ENCOUNTER — OFFICE VISIT (OUTPATIENT)
Dept: FAMILY MEDICINE CLINIC | Age: 31
End: 2021-03-16
Payer: COMMERCIAL

## 2021-03-16 VITALS
DIASTOLIC BLOOD PRESSURE: 77 MMHG | BODY MASS INDEX: 45.99 KG/M2 | WEIGHT: 293 LBS | TEMPERATURE: 98.2 F | RESPIRATION RATE: 18 BRPM | OXYGEN SATURATION: 98 % | SYSTOLIC BLOOD PRESSURE: 115 MMHG | HEART RATE: 88 BPM | HEIGHT: 67 IN

## 2021-03-16 DIAGNOSIS — D72.829 LEUKOCYTOSIS, UNSPECIFIED TYPE: ICD-10-CM

## 2021-03-16 DIAGNOSIS — E11.59 HYPERTENSION ASSOCIATED WITH DIABETES (HCC): ICD-10-CM

## 2021-03-16 DIAGNOSIS — G47.9 SLEEP DISORDER: ICD-10-CM

## 2021-03-16 DIAGNOSIS — E66.01 OBESITY, MORBID (HCC): ICD-10-CM

## 2021-03-16 DIAGNOSIS — F32.A DEPRESSION, UNSPECIFIED DEPRESSION TYPE: ICD-10-CM

## 2021-03-16 DIAGNOSIS — E11.9 CONTROLLED TYPE 2 DIABETES MELLITUS WITHOUT COMPLICATION, WITHOUT LONG-TERM CURRENT USE OF INSULIN (HCC): Primary | ICD-10-CM

## 2021-03-16 DIAGNOSIS — F17.200 SMOKER: ICD-10-CM

## 2021-03-16 DIAGNOSIS — F41.9 ANXIETY: ICD-10-CM

## 2021-03-16 DIAGNOSIS — E87.6 HYPOKALEMIA: ICD-10-CM

## 2021-03-16 DIAGNOSIS — I15.2 HYPERTENSION ASSOCIATED WITH DIABETES (HCC): ICD-10-CM

## 2021-03-16 LAB — HBA1C MFR BLD HPLC: 5.9 %

## 2021-03-16 PROCEDURE — 99214 OFFICE O/P EST MOD 30 MIN: CPT | Performed by: FAMILY MEDICINE

## 2021-03-16 PROCEDURE — 83036 HEMOGLOBIN GLYCOSYLATED A1C: CPT | Performed by: FAMILY MEDICINE

## 2021-03-16 RX ORDER — VARENICLINE TARTRATE 25 MG
KIT ORAL
Qty: 1 DOSE PACK | Refills: 0 | Status: SHIPPED | OUTPATIENT
Start: 2021-03-16 | End: 2021-04-13 | Stop reason: DRUGHIGH

## 2021-03-16 RX ORDER — POTASSIUM CHLORIDE 750 MG/1
10 TABLET, FILM COATED, EXTENDED RELEASE ORAL 2 TIMES DAILY
Qty: 90 TAB | Refills: 1 | Status: SHIPPED | OUTPATIENT
Start: 2021-03-16 | End: 2021-06-01 | Stop reason: SDUPTHER

## 2021-03-16 NOTE — PATIENT INSTRUCTIONS
[email or web address] Weight Loss Tips: 
Remember this is like a part time job so your motivation and commitment is key to your success. Mindset Weight loss like any other behavior change starts in your mind. Think hard about what your motivates you to lose weight then meditate on that. Remind yourself of your motivation 
with phone alarms, scheduled meditation time, vision board, journal- just to name a few ideas. Have realistic goals. We expect with diligent healthy diet and physical activity you can lose 5% of your body weight in 3 
months. Wt in lbs x 0.05 = #lbs you should lose in 3 months. Make food and activity changes with a goal of CONSISTENCY not perfection. Food Start eating differently. Most of your weight loss and gain is from what you eat. Use small plates only Drink 2 liters (1/2 gallon) of water every day HALF of every meal should be fruit or vegetables Try meal prepping on Sunday (or your day off) with new different vegetables. Consider meal prep service such as Cleaneatz.com, wepremeals. com Replace soda with diet soda or other zero sugar drinks (selter water just fine) Consider using the CloudSlides opal for calorie counting. Goal 4038-0845 calories per day Activity Staying physically active will help you lose more weight and can help you get over the plateau when you weight just 
won't change any more with diet. Start exercise at least 5 days per week for 40 minutes. Consider [employer] training opal for home exercises. You can start with walking. I suggest walking at a speed of at least 3.5-4.5mph to for the weight loss benefit. Increase your speed or distance every 2 weeks. Do some slow stretching daily of legs, arms and back. Consider adding weight training with light weights at home or at the gym. See a doctor or a physical training for 
instructions in order to avoid injuries from doing muscle training incorrectly.  
Free fitness program in [place]: [employer] org/program/fitness-warriors/ 
 
 
  
Learning About Eating More Fruits and Vegetables What are some quick tips for eating more fruits and vegetables? We're all encouraged to eat more fruits and vegetables. Yet it can seem like one more chore on the daily to-do list. But you can add color and crunch to your mealsand lots of nutritionwith these quick tips. · Brighten up your breakfast. 
? Add sliced fruit or frozen berries to your yogurt, pancakes, or cereal. 
? Blend fresh or frozen fruit, veggies, and yogurt with a little fruit juice, and you've got a tasty smoothie. ? Make your scrambled eggs a gourmet treat by adding onions, celery, and bell peppers. ? Bake up some bran muffins with grated carrots added into the mix. · Make a livelier lunch. ? Jazz up tuna or chicken salad with apple chunks, celery, or grapesor all of them! ? Add cucumbers, avocado slices, tomatoes, and lettuce to your sandwiches. ? Kick up the flavor of grilled cheese sandwiches with spinach and tomatoes. ? Puree some potatoes or squash to add to tomato soup. · Add delicious veggies to dinner. ? Give more color and taste to salads. Stir in red cabbage, carrots, and bell peppers. Top salads with dried cranberries or raisins. \"Frost\" your salad with orange sections or strawberries. ? Keep a bag or two of frozen vegetables ready to pull out of the freezer for a side dish. ? Spice up spaghetti and meatballs with mushrooms and bell peppers. ? Roast vegetables like cauliflower or squash in the oven with olive oil to bring out their flavor. ? Season your veggie dish with herbs like basil and perfecto and a splash of lemon juice and olive oil. ? If you've got a main dish in the oven, stick in a potato to round out your meal. 
· Grab some healthy snacks on the go. ? Scoop up an apple, banana, or plum for a quick snack. ? Cut up raw fruits and veggies to keep in your fridge.  Grapes, oranges, carrots, and celery are great choices. They'll be ready for a quick snack or an after-school treat. ? Dip raw vegetables in hummus or peanut butter. ? Keep dried fruit on hand for an easy \"take with you\" snack. · Make something sweetand healthy. ? Try baked apples or pears topped with cinnamon and honey for a delicious dessert. ? Make chocolate chip cookies even better with grated carrots added to the mix. Where can you learn more? Go to http://www.gray.com/ Enter F050 in the search box to learn more about \"Learning About Eating More Fruits and Vegetables. \" Current as of: August 22, 2019               Content Version: 12.6 © 9052-2201 [employer]. Care instructions adapted under license by [employer] (which disclaims liability or warranty for this information). If you have questions about a medical condition or this instruction, always ask your healthcare professional. [place] [address] any warranty or liability for your use of this information. Learning About Mindfulness for Stress What are mindfulness and stress? Stress is what you feel when you have to handle more than you are used to. A lot of things can cause stress. You may feel stress when you go on a job interview, take a test, or run a race. This kind of short-term stress is normal and even useful. It can help you if you need to work hard or react quickly. Stress also can last a long time. Long-term stress is caused by stressful situations or events. Examples of long-term stress include long-term health problems, ongoing problems at work, and conflicts in your family. Long-term stress can harm your health. Mindfulness is a focus only on things happening in the present moment. It's a process of purposefully paying attention to and being aware of your surroundings, your emotions, your thoughts, and how your body feels.  You are aware of these things, but you aren't judging these experiences as \"good\" or \"bad. \" Mindfulness can help you learn to calm your mind and body to help you cope with illness, pain, and stress. How does mindfulness help to relieve stress? Mindfulness can help quiet your mind and relax your body. Studies show that it can help some people sleep better, feel less anxious, and bring their blood pressure down. And it's been shown to help some people live and cope better with certain health problems like heart disease, depression, chronic pain, and cancer. How do you practice mindfulness? To be mindful is to pay attention, to be present, and to be accepting. · When you're mindful, you do just one thing and you pay close attention to that one thing. For example, you may sit quietly and notice your emotions or how your food tastes and smells. · When you're present, you focus on the things that are happening right now. You let go of your thoughts about the past and the future. When you dwell on the past or the future, you miss moments that can heal and strengthen you. You may miss moments like hearing a child laugh or seeing a friendly face when you think you're all alone. · When you're accepting, you don't  the present moment. Instead you accept your thoughts and feelings as they come. You can practice anytime, anywhere, and in any way you choose. You can practice in many ways. Here are a few ideas: · While doing your chores, like washing the dishes, let your mind focus on what's in your hand. What does the dish feel like? Is the water warm or cold? · Go outside and take a few deep breaths. What is the air like? Is it warm or cold? · When you can, take some time at the start of your day to sit alone and think. · Take a slow walk by yourself. Count your steps while you breathe in and out. · Try yoga breathing exercises, stretches, and poses to strengthen and relax your muscles. · At work, if you can, try to stop for a few moments each hour.  Note how your body feels. Let yourself regroup and let your mind settle before you return to what you were doing. · If you struggle with anxiety or \"worry thoughts,\" imagine your mind as a blue savana and your worry thoughts as clouds. Now imagine those worry thoughts floating across your mind's savana. Just let them pass by as you watch. Follow-up care is a key part of your treatment and safety. Be sure to make and go to all appointments, and call your doctor if you are having problems. It's also a good idea to know your test results and keep a list of the medicines you take. Where can you learn more? Go to http://www.gray.com/ Enter A959 in the search box to learn more about \"Learning About Mindfulness for Stress. \" Current as of: December 16, 2019               Content Version: 12.6 © 3851-0560 [employer]. Care instructions adapted under license by [employer] (which disclaims liability or warranty for this information). If you have questions about a medical condition or this instruction, always ask your healthcare professional. [address] any warranty or liability for your use of this information.

## 2021-03-16 NOTE — PROGRESS NOTES
[place] [facility] \[facility]\"" Note    Assessment/ Plan:   Diagnoses and all orders for this visit:    1. Controlled type 2 diabetes mellitus without complication, without long-term current use of insulin (HCC)  -     AMB POC HEMOGLOBIN A1C    2. Hypertension associated with diabetes ([address]    3. Hypokalemia  -     potassium chloride SR (KLOR-CON 10) 10 mEq tablet; Take 1 Tab by mouth two (2) times a day. 4. Leukocytosis, unspecified type  -     PERIPHERAL SMEAR; Future    5. Depression, unspecified depression type    6. Anxiety    7. Sleep disorder    8. Obesity, morbid (HCC)    9. Smoker  -     varenicline (CHANTIX STARTER JOCELIN) 0.5 mg (11)- 1 mg (42) DsPk; Days 1 to 3: 0.5 mg once daily. Days 4 to 7: 0.5 mg twice daily. Day 8 and later: 1 mg twice daily      Recommendations based on history, physical exam and review of pertinent labs, studies and medications:   Diabetes. Labs to monitor. Continue current regimen. Blood pessure is well controlled. Continue current regimen. DASH Diet recommended. Hypokalemia likely diuretic induced. Continue potassium supplement. Leukocytosis, unclear etiology. Peripheral smear to evaluate. Sleep difficulty concerning for PRISCILA, refferal for sleep study. Continue trazodone. .   Depression with anxiety uncontrolled. Increase trazodone. On the basis of positive. Encouraged counseling for mood disorder. Follow up with specialists per routine. The patient was counseled for 3 minutes on the dangers of tobacco use, and was advised to quit. Reviewed strategies to maximize success, including removing cigarettes and smoking materials from environment, stress management and pharmacotherapy (chantix). Educated patient on red flag symptoms to warrant return to clinic or emergency room visit. I have discussed the diagnosis with the patient and the intended plan as seen in the above orders.   The patient has been offered or received an after-visit summary and questions were answered concerning future plans. I have discussed medication side effects and warnings with the patient as well. Follow-up and Dispositions    · Return in about 3 months (around 6/16/2021) for Follow Up weight. Subjective:     Chief Complaint   Patient presents with    Diabetes     3 month follow up     Samanta Cooper is a 35y.o. year old female who presents for evaluation of the following:       Diabetes Mellitus Type II:  Chronic. Diagnosed 2020  Home monitoring: No log in office  Treatment:   Key Antihyperglycemic Medications             metFORMIN (GLUCOPHAGE) 1,000 mg tablet (Taking) Take 1 Tab by mouth two (2) times daily (with meals). liraglutide (Victoza 3-Reyes) 0.6 mg/0.1 mL (18 mg/3 mL) pnij (Taking) INJECT 0.6 MG SUBCUTANEOUSLY ONCE DAILY FOR 1 WEEK THEN INCREASE TO 1.2 MG ONCE DAILY      Not adhering to diabetic diet  Complications: None  Co-morbidities: Obesity  Managed by PCP  Lab Results   Component Value Date/Time    Hemoglobin A1c 6.3 (H) 12/14/2020 11:08 AM    Hemoglobin A1c (POC) 5.9 03/16/2021 10:30 AM     Hypertension with DMII:  Chronic. Home monitoring: None  Treatment:   Key CAD CHF Meds             amLODIPine (NORVASC) 10 mg tablet (Taking) Take 1 Tab by mouth daily. Indications: high blood pressure    lisinopriL (PRINIVIL, ZESTRIL) 20 mg tablet (Taking) Take 1 Tab by mouth daily. chlorthalidone (HYGROTEN) 50 mg tablet (Taking) Take 1 Tab by mouth daily. Not adhering to strict low salt diet  - no table salt.    Complications: None    Co-morbidities: Obesity  BP Readings from Last 3 Encounters:   03/16/21 115/77   02/10/21 129/75   12/14/20 137/78     Diuretic Induced Hypokalemia:   Taking thiazide diuretic  Lab Results   Component Value Date/Time    Potassium 4.3 02/04/2021 09:29 AM     Smoking  Onset age 16  Current Use: 1/2 pack per day cigarettes  previous 1.5 pack per day until quit in 2018 then restarted at 0.5 pack  Previous Tx: Wellbutrin ineffective  Desires chantix      WBC Elevated:   Tx: none  Lab Results   Component Value Date/Time    WBC 14.7 (H) 12/14/2020 11:08 AM       Anxiety:   Chornic  Mood: anxiety uncontolled  Current Sx: anxious, poor sleep, cant concentrate  - poor sleep maintenance, never had sleep apnea evaluation, gets 4hours of sleep at night  Triggers: Sister shot boyfriend, car/financial stress  Therapy: Kimani Smalls   [facility]             busPIRone (BUSPAR) 7.5 mg tablet (Taking) Take 1 Tab by mouth two (2) times a day. traZODone (DESYREL) 100 mg tablet (Taking) Take 1 Tab by mouth nightly. Indications: insomnia associated with depression    escitalopram oxalate (LEXAPRO) 20 mg tablet (Taking) Take 1 Tab by mouth daily. - feels trazodone not as effective as before  Previous Tx: melatonin, tylenol PM, ZZZquil  BILL: 18>18>15  PHQ: 14>17>9  3 most recent PHQ Screens 3/16/2021   Little interest or pleasure in doing things Not at all   Feeling down, depressed, irritable, or hopeless Not at all   Total Score PHQ 2 0   Trouble falling or staying asleep, or sleeping too much -   Feeling tired or having little energy -   Poor appetite, weight loss, or overeating -   Feeling bad about yourself - or that you are a failure or have let yourself or your family down -   Trouble concentrating on things such as school, work, reading, or watching TV -   Moving or speaking so slowly that other people could have noticed; or the opposite being so fidgety that others notice -   Thoughts of being better off dead, or hurting yourself in some way -   PHQ 9 Score -       Obesity:   Patient perception: losing weight  Diet: cutting down on soda  Activity: None   Treatment:   Key Obesity Meds             metFORMIN (GLUCOPHAGE) 1,000 mg tablet (Taking) Take 1 Tab by mouth two (2) times daily (with meals).     liraglutide (Victoza 3-Reyes) 0.6 mg/0.1 mL (18 mg/3 mL) pnij (Taking) INJECT 0.6 MG SUBCUTANEOUSLY ONCE DAILY FOR 1 WEEK THEN INCREASE TO 1.2 MG ONCE DAILY    chlorthalidone (HYGROTEN) 50 mg tablet (Taking) Take 1 Tab by mouth daily. Previous Treatment: None  Last Weight Metrics:  Weight Loss Metrics 3/16/2021 2/10/2021 12/14/2020 11/13/2020 10/27/2020 9/3/2020 7/13/2020   Today's Wt 437 lb 9.6 oz 432 lb 9.6 oz 439 lb 3.2 oz 448 lb 6.4 oz 441 lb 451 lb 9.6 oz 454 lb   BMI 68.54 kg/m2 67.75 kg/m2 68.79 kg/m2 70.23 kg/m2 69.07 kg/m2 70.73 kg/m2 71.11 kg/m2          PCP is Angela          Review of Systems   Pertinent positives and negative per HPI. All other systems  reviewed are negative for a Comprehensive ROS (10+). Past medical history, social history, family history reviewed. Medications reconciled. Objective:     Vitals:    03/16/21 1017   BP: 115/77   Pulse: 88   Resp: 18   Temp: 98.2 °F (36.8 °C)   TempSrc: Temporal   SpO2: 98%   Weight: (!) 437 lb 9.6 oz (198.5 kg)   Height: 5' 7\" (1.702 m)       Physical Examination:  General: Alert, cooperative, no distress, appears stated age. Obese  Eyes: Conjunctivae clear. Pupils equally round and reactive to light, Extraocular muscles intact. Ears: Normal external ear canals both ears. Nose: Nares normal. Septum midline. Mucosa normal. No drainage or sinus tenderness. Mouth/Throat: Lips, mucosa, and tongue normal. No oropharyngeal erythema. Neck: Supple, symmetrical, trachea midline, no adenopathy. No thyroid enlargement/tenderness/nodules  Respiratory: Breathing comfortably, in no acute respiratory distress. Clear to auscultation bilaterally. Normal inspiratory and expiratory ratio. Cardiovascular: Regular rate and rhythm, S1, S2 normal, no murmur, click, rub or gallop.   -Extremities no edema. Pulses 2+ and symmetric radial and dorsalis pedis   Abdomen: Soft, non-tender, not distended. MSK: Extremities normal appearing, atraumatic, no effusion. Gait steady and unassisted. Skin: Skin color, texture, turgor normal. No rashes or lesions on exposed skin.   Lymph nodes: Cervical, supraclavicular nodes normal.  Neurologic: Cranial nerves II-XII intact. Strength 5/5 grossly. Sensation and reflexes normal throughout. Psychiatric: Affect appropriate. Mood euthymic.  Thoughts logical. Speech volume and speed normal      Signed,    Lanis Opitz, MD  3/16/2021

## 2021-03-16 NOTE — PROGRESS NOTES
Chief Complaint   Patient presents with    Diabetes     3 month follow up       1. Have you been to the ER, urgent care clinic since your last visit? Hospitalized since your last visit? No    2. Have you seen or consulted any other health care providers outside of the [address] [place] [address] since your last visit? Include any pap smears or colon screening.  No    3 most recent PHQ Screens 3/16/2021   Little interest or pleasure in doing things Not at all   Feeling down, depressed, irritable, or hopeless Not at all   Total Score PHQ 2 0   Trouble falling or staying asleep, or sleeping too much -   Feeling tired or having little energy -   Poor appetite, weight loss, or overeating -   Feeling bad about yourself - or that you are a failure or have let yourself or your family down -   Trouble concentrating on things such as school, work, reading, or watching TV -   Moving or speaking so slowly that other people could have noticed; or the opposite being so fidgety that others notice -   Thoughts of being better off dead, or hurting yourself in some way -   PHQ 9 Score -

## 2021-03-17 LAB — PERIPHERAL SMEAR,PSM: NORMAL

## 2021-03-24 DIAGNOSIS — J45.40 MODERATE PERSISTENT ASTHMA WITHOUT COMPLICATION: ICD-10-CM

## 2021-03-24 DIAGNOSIS — E11.9 CONTROLLED TYPE 2 DIABETES MELLITUS WITHOUT COMPLICATION, WITHOUT LONG-TERM CURRENT USE OF INSULIN (HCC): ICD-10-CM

## 2021-03-24 DIAGNOSIS — E66.01 MORBID OBESITY (HCC): ICD-10-CM

## 2021-03-24 NOTE — TELEPHONE ENCOUNTER
Last Visit: 3/16/21 MD Nicki Mcburney  Next Appointment: Not scheduled- due 6/2021  Previous Refill Encounter(s):   victoza 12/22/20 9 + 1  Albuterol nebs 12/15/20 75ml + 3    Requested Prescriptions     Pending Prescriptions Disp Refills    liraglutide (Victoza 3-Reyes) 0.6 mg/0.1 mL (18 mg/3 mL) pnij 9 Each 2     Sig: INJECT 1.2 MG SUBCUTANEOUSLY ONCE DAILY    albuterol (PROVENTIL VENTOLIN) 2.5 mg /3 mL (0.083 %) nebu 75 mL 3     Sig: Use 1 vial in nebulizer every 4 hours as needed for shortness of breath or cough.

## 2021-03-25 RX ORDER — ALBUTEROL SULFATE 0.83 MG/ML
SOLUTION RESPIRATORY (INHALATION)
Qty: 75 ML | Refills: 0 | Status: SHIPPED | OUTPATIENT
Start: 2021-03-25 | End: 2021-04-13 | Stop reason: SDUPTHER

## 2021-03-25 RX ORDER — LIRAGLUTIDE 6 MG/ML
INJECTION SUBCUTANEOUS
Qty: 9 EACH | Refills: 0 | Status: SHIPPED | OUTPATIENT
Start: 2021-03-25 | End: 2021-06-01 | Stop reason: SDUPTHER

## 2021-04-10 DIAGNOSIS — K21.9 GASTROESOPHAGEAL REFLUX DISEASE, UNSPECIFIED WHETHER ESOPHAGITIS PRESENT: ICD-10-CM

## 2021-04-10 RX ORDER — OMEPRAZOLE 40 MG/1
40 CAPSULE, DELAYED RELEASE ORAL
Qty: 90 CAP | Refills: 0 | Status: CANCELLED | OUTPATIENT
Start: 2021-04-10

## 2021-04-13 DIAGNOSIS — E11.9 CONTROLLED TYPE 2 DIABETES MELLITUS WITHOUT COMPLICATION, WITHOUT LONG-TERM CURRENT USE OF INSULIN (HCC): ICD-10-CM

## 2021-04-13 DIAGNOSIS — J45.40 MODERATE PERSISTENT ASTHMA WITHOUT COMPLICATION: ICD-10-CM

## 2021-04-14 NOTE — TELEPHONE ENCOUNTER
PCP: Tricia Torres NP    Last appt: 3/16/2021  No future appointments. Requested Prescriptions     Pending Prescriptions Disp Refills    metFORMIN (GLUCOPHAGE) 1,000 mg tablet 180 Tab 0     Sig: Take 1 Tab by mouth two (2) times daily (with meals).        Prior labs and Blood pressures:  BP Readings from Last 3 Encounters:   03/16/21 115/77   02/10/21 129/75   12/14/20 137/78     Lab Results   Component Value Date/Time    Sodium 138 02/04/2021 09:29 AM    Potassium 4.3 02/04/2021 09:29 AM    Chloride 105 02/04/2021 09:29 AM    CO2 23 02/04/2021 09:29 AM    Anion gap 10 02/04/2021 09:29 AM    Glucose 92 02/04/2021 09:29 AM    BUN 15 02/04/2021 09:29 AM    Creatinine 0.70 02/04/2021 09:29 AM    BUN/Creatinine ratio 21 (H) 02/04/2021 09:29 AM    GFR est AA >60 02/04/2021 09:29 AM    GFR est non-AA >60 02/04/2021 09:29 AM    Calcium 9.5 02/04/2021 09:29 AM     Lab Results   Component Value Date/Time    Hemoglobin A1c 6.3 (H) 12/14/2020 11:08 AM    Hemoglobin A1c (POC) 5.9 03/16/2021 10:30 AM     Lab Results   Component Value Date/Time    Cholesterol, total 152 09/08/2020 11:05 AM    HDL Cholesterol 60 09/08/2020 11:05 AM    LDL, calculated 70 09/08/2020 11:05 AM    LDL, calculated 74 07/29/2019 11:47 AM    VLDL, calculated 22 09/08/2020 11:05 AM    VLDL, calculated 22 07/29/2019 11:47 AM    Triglyceride 128 09/08/2020 11:05 AM     No results found for: FARHAT Avendaño    Lab Results   Component Value Date/Time    TSH 1.900 08/23/2019 11:03 AM    TSH 1.780 01/24/2012 10:40 AM

## 2021-04-17 DIAGNOSIS — K21.9 GASTROESOPHAGEAL REFLUX DISEASE, UNSPECIFIED WHETHER ESOPHAGITIS PRESENT: ICD-10-CM

## 2021-04-17 RX ORDER — METFORMIN HYDROCHLORIDE 1000 MG/1
1000 TABLET ORAL 2 TIMES DAILY WITH MEALS
Qty: 180 TAB | Refills: 0 | Status: SHIPPED | OUTPATIENT
Start: 2021-04-17 | End: 2021-06-01 | Stop reason: SDUPTHER

## 2021-04-17 RX ORDER — ALBUTEROL SULFATE 0.83 MG/ML
SOLUTION RESPIRATORY (INHALATION)
Qty: 75 ML | Refills: 0 | Status: SHIPPED | OUTPATIENT
Start: 2021-04-17 | End: 2021-06-30

## 2021-04-26 RX ORDER — OMEPRAZOLE 40 MG/1
40 CAPSULE, DELAYED RELEASE ORAL
Qty: 90 CAP | Refills: 0 | Status: SHIPPED | OUTPATIENT
Start: 2021-04-26 | End: 2021-06-01 | Stop reason: SDUPTHER

## 2021-04-27 ENCOUNTER — TELEPHONE (OUTPATIENT)
Dept: FAMILY MEDICINE CLINIC | Age: 31
End: 2021-04-27

## 2021-04-27 NOTE — TELEPHONE ENCOUNTER
Chief Complaint   Patient presents with    Prior Auth     OMEPRAZOLE DR 40 MG CAPSULES:  APPROVED:  04/27/2021 through 04/27/2022. [facility] pharmacy was faxed approval notification at [phone] with confirmation received.   Cheryle Nasuti, LPN

## 2021-05-03 DIAGNOSIS — K21.9 GASTROESOPHAGEAL REFLUX DISEASE, UNSPECIFIED WHETHER ESOPHAGITIS PRESENT: ICD-10-CM

## 2021-05-03 RX ORDER — OMEPRAZOLE 40 MG/1
40 CAPSULE, DELAYED RELEASE ORAL
Qty: 90 CAP | Refills: 0 | OUTPATIENT
Start: 2021-05-03

## 2021-06-01 ENCOUNTER — OFFICE VISIT (OUTPATIENT)
Dept: FAMILY MEDICINE CLINIC | Age: 31
End: 2021-06-01
Payer: COMMERCIAL

## 2021-06-01 VITALS
BODY MASS INDEX: 45.99 KG/M2 | SYSTOLIC BLOOD PRESSURE: 114 MMHG | OXYGEN SATURATION: 99 % | WEIGHT: 293 LBS | DIASTOLIC BLOOD PRESSURE: 77 MMHG | HEART RATE: 77 BPM | RESPIRATION RATE: 18 BRPM | TEMPERATURE: 97.8 F | HEIGHT: 67 IN

## 2021-06-01 DIAGNOSIS — I10 ESSENTIAL HYPERTENSION: ICD-10-CM

## 2021-06-01 DIAGNOSIS — F32.A DEPRESSION, UNSPECIFIED DEPRESSION TYPE: ICD-10-CM

## 2021-06-01 DIAGNOSIS — E87.6 HYPOKALEMIA: ICD-10-CM

## 2021-06-01 DIAGNOSIS — R79.89 ELEVATED LFTS: ICD-10-CM

## 2021-06-01 DIAGNOSIS — J45.40 MODERATE PERSISTENT ASTHMA WITHOUT COMPLICATION: ICD-10-CM

## 2021-06-01 DIAGNOSIS — H53.8 BLURRY VISION, BILATERAL: ICD-10-CM

## 2021-06-01 DIAGNOSIS — F41.9 ANXIETY: ICD-10-CM

## 2021-06-01 DIAGNOSIS — F17.200 SMOKER: ICD-10-CM

## 2021-06-01 DIAGNOSIS — K21.9 GASTROESOPHAGEAL REFLUX DISEASE, UNSPECIFIED WHETHER ESOPHAGITIS PRESENT: ICD-10-CM

## 2021-06-01 DIAGNOSIS — F41.9 ANXIETY AND DEPRESSION: Primary | ICD-10-CM

## 2021-06-01 DIAGNOSIS — G47.33 OSA (OBSTRUCTIVE SLEEP APNEA): ICD-10-CM

## 2021-06-01 DIAGNOSIS — G47.9 SLEEP DISORDER: ICD-10-CM

## 2021-06-01 DIAGNOSIS — F32.A ANXIETY AND DEPRESSION: Primary | ICD-10-CM

## 2021-06-01 DIAGNOSIS — D72.829 LEUKOCYTOSIS, UNSPECIFIED TYPE: ICD-10-CM

## 2021-06-01 DIAGNOSIS — E66.01 MORBID OBESITY (HCC): ICD-10-CM

## 2021-06-01 DIAGNOSIS — E11.9 CONTROLLED TYPE 2 DIABETES MELLITUS WITHOUT COMPLICATION, WITHOUT LONG-TERM CURRENT USE OF INSULIN (HCC): ICD-10-CM

## 2021-06-01 PROCEDURE — 99214 OFFICE O/P EST MOD 30 MIN: CPT | Performed by: NURSE PRACTITIONER

## 2021-06-01 RX ORDER — AMLODIPINE BESYLATE 10 MG/1
10 TABLET ORAL DAILY
Qty: 90 TABLET | Refills: 2 | Status: SHIPPED | OUTPATIENT
Start: 2021-06-01 | End: 2022-01-11 | Stop reason: SDUPTHER

## 2021-06-01 RX ORDER — VARENICLINE TARTRATE 1 MG/1
1 TABLET, FILM COATED ORAL 2 TIMES DAILY
Qty: 60 TABLET | Refills: 0 | Status: SHIPPED | OUTPATIENT
Start: 2021-06-01 | End: 2021-07-06

## 2021-06-01 RX ORDER — TRAZODONE HYDROCHLORIDE 100 MG/1
100 TABLET ORAL
Qty: 90 TABLET | Refills: 2 | Status: SHIPPED | OUTPATIENT
Start: 2021-06-01 | End: 2022-01-11 | Stop reason: SDUPTHER

## 2021-06-01 RX ORDER — CHLORTHALIDONE 50 MG/1
50 TABLET ORAL DAILY
Qty: 90 TABLET | Refills: 2 | Status: SHIPPED | OUTPATIENT
Start: 2021-06-01 | End: 2022-01-11 | Stop reason: SDUPTHER

## 2021-06-01 RX ORDER — ESCITALOPRAM OXALATE 20 MG/1
20 TABLET ORAL DAILY
Qty: 90 TABLET | Refills: 2 | Status: SHIPPED | OUTPATIENT
Start: 2021-06-01 | End: 2022-04-11 | Stop reason: SDUPTHER

## 2021-06-01 RX ORDER — BUSPIRONE HYDROCHLORIDE 7.5 MG/1
7.5 TABLET ORAL 2 TIMES DAILY
Qty: 180 TABLET | Refills: 2 | Status: SHIPPED | OUTPATIENT
Start: 2021-06-01 | End: 2021-09-08 | Stop reason: SDUPTHER

## 2021-06-01 RX ORDER — LISINOPRIL 20 MG/1
20 TABLET ORAL DAILY
Qty: 90 TABLET | Refills: 2 | Status: SHIPPED | OUTPATIENT
Start: 2021-06-01 | End: 2022-01-11 | Stop reason: SDUPTHER

## 2021-06-01 RX ORDER — OMEPRAZOLE 40 MG/1
40 CAPSULE, DELAYED RELEASE ORAL
Qty: 90 CAPSULE | Refills: 2 | Status: SHIPPED | OUTPATIENT
Start: 2021-06-01 | End: 2022-03-14 | Stop reason: SDUPTHER

## 2021-06-01 RX ORDER — LIRAGLUTIDE 6 MG/ML
INJECTION SUBCUTANEOUS
Qty: 9 EACH | Refills: 1 | Status: SHIPPED | OUTPATIENT
Start: 2021-06-01 | End: 2022-02-09 | Stop reason: SDUPTHER

## 2021-06-01 RX ORDER — POTASSIUM CHLORIDE 750 MG/1
10 TABLET, FILM COATED, EXTENDED RELEASE ORAL 2 TIMES DAILY
Qty: 180 TABLET | Refills: 2 | Status: SHIPPED | OUTPATIENT
Start: 2021-06-01 | End: 2022-01-11 | Stop reason: SDUPTHER

## 2021-06-01 RX ORDER — METFORMIN HYDROCHLORIDE 1000 MG/1
1000 TABLET ORAL 2 TIMES DAILY WITH MEALS
Qty: 180 TABLET | Refills: 2 | Status: SHIPPED | OUTPATIENT
Start: 2021-06-01 | End: 2022-04-11 | Stop reason: SDUPTHER

## 2021-06-01 NOTE — PROGRESS NOTES
[facility] Note  Subjective:      Ana Braden is a 35 y.o. female who presents for follow-up. Chief Complaint   Patient presents with    Medication Evaluation     Cardiovascular Review:  The patient has hypertension, paroxymal a fib, aortic valve regurgitation, obesity, DM2, diuretic induced hypokalemia. Cardiologist is Dr. Yeni Cruz. Home BP Monitoring: not checked. Diet and Lifestyle: sedentary, nonsmoker  Weight is fluctuating but down 22 lbs since starting Victoza. She has noticed decreased good cravings and smaller portions sizes. No adverse effects to date. Medications: Norvasc 10 mg, chlorthalidone 50 mg, lisinopril 20 mg daily, Metformin 1,000 BID, Klor-con 10 mEq BID. She complains 3-4 episodes of mild blurry vision, both eyes, lasting a few seconds to several minutes. No associated symptoms. Last eye exam 2 years ago. Reflux improved on omeprazole. Had GI evaluation for reflux labs from 5/1/2021 reviewed on [facility] patient portal, LFT normalized. Negative celiac antibody. WBC 12.1, remaining CBC unremarkable. Referred for sleep study, diagnosed with CPAP, Waiting on the machine. Quit smoking about 1 week ago. On chantix for 2 months. Discussed will continue for additional 30 days, then discontinue. History of anxiety and depression, sleep difficulties. Current symptoms: Mostly lack of motivation, more worries. Sleep is okay, some good nights and some nights waking up often. No issues getting to sleep. Current treatment:  Lexapro 20 mg daily,  trazodone 100 mg nightly, Buspar. No side effects. Seeing counselor. Previous therapy: Desvenlafaxine not helpful. Wellbutrin not helpful. Current Outpatient Medications   Medication Sig Dispense Refill    amLODIPine (NORVASC) 10 mg tablet Take 1 Tablet by mouth daily. Indications: high blood pressure 90 Tablet 2    busPIRone (BUSPAR) 7.5 mg tablet Take 1 Tablet by mouth two (2) times a day.  [address] [place] [address] Tablet 2    chlorthalidone (HYGROTEN) 50 mg tablet Take 1 Tablet by mouth daily. 90 Tablet 2    escitalopram oxalate (LEXAPRO) 20 mg tablet Take 1 Tablet by mouth daily. 90 Tablet 2    liraglutide (Victoza 3-Reyes) 0.6 mg/0.1 mL (18 mg/3 mL) pnij INJECT 1.2 MG SUBCUTANEOUSLY ONCE DAILY 9 Each 1    lisinopriL (PRINIVIL, ZESTRIL) 20 mg tablet Take 1 Tablet by mouth daily. 90 Tablet 2    metFORMIN (GLUCOPHAGE) 1,000 mg tablet Take 1 Tablet by mouth two (2) times daily (with meals). 180 Tablet 2    omeprazole (PRILOSEC) 40 mg capsule Take 1 Capsule by mouth Daily (before breakfast). 90 Capsule 2    traZODone (DESYREL) 100 mg tablet Take 1 Tablet by mouth nightly. Indications: insomnia associated with depression 90 Tablet 2    potassium chloride SR (KLOR-CON 10) 10 mEq tablet Take 1 Tablet by mouth two (2) times a day. 180 Tablet 2    varenicline (CHANTIX) 1 mg tablet Take 1 Tablet by mouth two (2) times a day for 30 days. 60 Tablet 0    albuterol (PROVENTIL VENTOLIN) 2.5 mg /3 mL (0.083 %) nebu Use 1 vial in nebulizer every 4 hours as needed for shortness of breath or cough. 75 mL 0    etonogestreL (Nexplanon) 68 mg impl by SubDERmal route.  Insulin Needles, Disposable, 31 gauge x 5/16\" ndle Use daily with victoza 1 Package 11    albuterol (PROVENTIL HFA, VENTOLIN HFA, PROAIR HFA) 90 mcg/actuation inhaler Take 1 Puff by inhalation every four (4) hours as needed for Wheezing. Shake well before each inhalation. (Patient taking differently: Take 1 Puff by inhalation every four (4) hours as needed for Wheezing. Shake well before each inhalation.) 2 Inhaler 2    cholecalciferol (VITAMIN D3) 25 mcg (1,000 unit) cap Take  by mouth daily.  multivitamin (ONE A DAY) tablet Take 1 Tab by mouth daily.  cyanocobalamin, vitamin B-12, (VITAMIN B12 PO) Take  by mouth.  ascorbic acid, vitamin C, (VITAMIN C) 500 mg tablet Take 1,000 mg by mouth.        Allergies   Allergen Reactions    Ibuprofen Nausea and Vomiting    Singulair [Montelukast] Other (comments)     Difficulty breathing       ROS:   Complete review of systems was reviewed with pertinent information listed in HPI. Review of Systems   Constitutional: Negative for chills, diaphoresis, fever, malaise/fatigue and weight loss. HENT: Negative for congestion, ear pain, hearing loss, sinus pain, sore throat and tinnitus. Eyes: Negative for blurred vision and double vision. Respiratory: Negative for shortness of breath. Cardiovascular: Negative for chest pain, palpitations and leg swelling. Gastrointestinal: Negative for abdominal pain, blood in stool, constipation, diarrhea, heartburn, melena, nausea and vomiting. Genitourinary: Negative for dysuria, frequency, hematuria and urgency. Musculoskeletal: Negative for joint pain and myalgias. Skin: Negative. Negative for itching and rash. Neurological: Negative for dizziness, tingling, weakness and headaches. Endo/Heme/Allergies: Negative for polydipsia. Psychiatric/Behavioral: Positive for depression. Negative for hallucinations, memory loss, substance abuse and suicidal ideas. The patient is nervous/anxious. The patient does not have insomnia. Objective:     Visit Vitals  /77 (BP 1 Location: Right lower arm, BP Patient Position: Sitting, BP Cuff Size: Large adult)   Pulse 77   Temp 97.8 °F (36.6 °C) (Temporal)   Resp 18   Ht 5' 7\" (1.702 m)   Wt (!) 426 lb 3.2 oz (193.3 kg)   SpO2 99%   BMI 66.75 kg/m²       Vitals and Nurse Documentation reviewed. Physical Exam  Vitals and nursing note reviewed. Constitutional:       General: She is not in acute distress. Appearance: She is obese. She is not ill-appearing, toxic-appearing or diaphoretic. HENT:      Head: Normocephalic and atraumatic. Eyes:      General: Lids are normal. Vision grossly intact. Gaze aligned appropriately. Extraocular Movements: Extraocular movements intact.       Conjunctiva/sclera: Conjunctivae normal.   Cardiovascular:      Rate and Rhythm: Normal rate and regular rhythm. Heart sounds: Normal heart sounds, S1 normal and S2 normal. No murmur heard. No friction rub. No gallop. Pulmonary:      Effort: Pulmonary effort is normal. No respiratory distress. Breath sounds: Normal breath sounds. No wheezing or rales. Chest:      Chest wall: No tenderness. Abdominal:      General: Bowel sounds are normal. There is no distension. Palpations: Abdomen is soft. Tenderness: There is no abdominal tenderness. Musculoskeletal:      Right lower leg: No edema. Left lower leg: No edema. Skin:     General: Skin is warm and dry. Neurological:      Mental Status: She is alert and oriented to person, place, and time. Gait: Gait is intact. Psychiatric:         Mood and Affect: Mood and affect normal. Mood is not anxious or depressed. Affect is not flat. Speech: Speech normal.         Behavior: Behavior normal. Behavior is cooperative. Thought Content: Thought content normal.         Cognition and Memory: Memory normal.         Judgment: Judgment normal.         Assessment/Plan:     Diagnoses and all orders for this visit:    1. Anxiety and depression: Continue current therapy. Advised CBT. Lets see how motivation and mood improves once she starts treatment for PRISCILA. -     busPIRone (BUSPAR) 7.5 mg tablet; Take 1 Tablet by mouth two (2) times a day. -     escitalopram oxalate (LEXAPRO) 20 mg tablet; Take 1 Tablet by mouth daily. 2. Gastroesophageal reflux disease, unspecified whether esophagitis present: Improved on higher dose PPI. -     omeprazole (PRILOSEC) 40 mg capsule; Take 1 Capsule by mouth Daily (before breakfast). 3. Moderate persistent asthma without complication: Stable, at goal. Continue current therapy. Of note, did not tolerate Singulair.      4. Controlled type 2 diabetes mellitus without complication, without long-term current use of insulin ([facility]): Stable, at goal. Continue current therapy. Victoza for co-morbid morbid obesity; 22 lb weight reduction since starting therapy.   -     REFERRAL TO OPHTHALMOLOGY  -     liraglutide (Victoza 3-Reyes) 0.6 mg/0.1 mL (18 mg/3 mL) pnij; INJECT 1.2 MG SUBCUTANEOUSLY ONCE DAILY  -     metFORMIN (GLUCOPHAGE) 1,000 mg tablet; Take 1 Tablet by mouth two (2) times daily (with meals). -     HEMOGLOBIN A1C WITH EAG; Future  -     METABOLIC PANEL, COMPREHENSIVE; Future    5. Morbid obesity ([facility]): see #4. She is considering surgical weight loss. -     liraglutide (Victoza 3-Reyes) 0.6 mg/0.1 mL (18 mg/3 mL) pnij; INJECT 1.2 MG SUBCUTANEOUSLY ONCE DAILY    6. Sleep disorder: Newly diagnosed PRISCILA, awaiting cpap machine. Continue trazodone for now. -     traZODone (DESYREL) 100 mg tablet; Take 1 Tablet by mouth nightly. Indications: insomnia associated with depression    7. Essential hypertension: Stable, at goal. Continue current therapy. -     amLODIPine (NORVASC) 10 mg tablet; Take 1 Tablet by mouth daily. Indications: high blood pressure  -     chlorthalidone (HYGROTEN) 50 mg tablet; Take 1 Tablet by mouth daily. -     lisinopriL (PRINIVIL, ZESTRIL) 20 mg tablet; Take 1 Tablet by mouth daily. 8. Elevated LFTs: resolved. 9. Leukocytosis, unspecified type: WBC improved 5/1/2021. Unremarkable peripheral smear. Repeat at next visit. Referral to hemo/onc for continued elevation.   -     CBC WITH AUTOMATED DIFF; Future    10. Hypokalemia: Secondary to thiazide, continue current supplement. potasium at goal per labs on 5/1/2021 - see scanned media. -     potassium chloride SR (KLOR-CON 10) 10 mEq tablet; Take 1 Tablet by mouth two (2) times a day. 11. Blurry vision, bilateral  -     REFERRAL TO OPHTHALMOLOGY    12. Anxiety  -     traZODone (DESYREL) 100 mg tablet; Take 1 Tablet by mouth nightly. Indications: insomnia associated with depression    13.  Depression, unspecified depression type  - traZODone (DESYREL) 100 mg tablet; Take 1 Tablet by mouth nightly. Indications: insomnia associated with depression    14. Smoker: Has been on chantix for about 2 months. Completely Quit smoking 1-2 weeks ago. Continue for additional 30 days, then discontinue.   -     varenicline (CHANTIX) 1 mg tablet; Take 1 Tablet by mouth two (2) times a day for 30 days. 15. PRISCILA (obstructive sleep apnea): Cpap pending. Follow-up and Dispositions    · Return in about 2 months (around 8/1/2021).        Discussed expected course/resolution/complications of diagnosis in detail with patient.    Medication risks/benefits/costs/interactions/alternatives discussed with patient.    Pt was given an after visit summary which includes diagnoses, current medications & vitals.  Pt expressed understanding with the diagnosis and plan

## 2021-06-01 NOTE — PROGRESS NOTES
Identified pt with two pt identifiers(name and ). Reviewed record in preparation for visit and have obtained necessary documentation. Chief Complaint   Patient presents with    Medication Evaluation        Vitals:    21 1054   Weight: (!) 426 lb 3.2 oz (193.3 kg)   Height: 5' 7\" (1.702 m)   PainSc:   0 - No pain       Health Maintenance Due   Topic    Eye Exam Retinal or Dilated     DTaP/Tdap/Td series (1 - Tdap)    Pneumococcal 0-64 years (1 of 2 - PPSV23)    PAP AKA CERVICAL CYTOLOGY        Coordination of Care Questionnaire:  :   1) Have you been to an emergency room, urgent care, or hospitalized since your last visit? If yes, where when, and reason for visit? no       2. Have seen or consulted any other health care provider since your last visit? If yes, where when, and reason for visit? YES Dr. Nathan Royal Specialist        Patient is accompanied by self I have received verbal consent from Jesús Huddleston to discuss any/all medical information while they are present in the room.

## 2021-06-02 ENCOUNTER — PATIENT MESSAGE (OUTPATIENT)
Dept: FAMILY MEDICINE CLINIC | Age: 31
End: 2021-06-02

## 2021-06-02 DIAGNOSIS — F41.9 ANXIETY AND DEPRESSION: Primary | ICD-10-CM

## 2021-06-02 DIAGNOSIS — F32.A ANXIETY AND DEPRESSION: Primary | ICD-10-CM

## 2021-06-02 RX ORDER — HYDROXYZINE 25 MG/1
25 TABLET, FILM COATED ORAL
Qty: 90 TABLET | Refills: 0 | Status: SHIPPED | OUTPATIENT
Start: 2021-06-02

## 2021-06-02 NOTE — TELEPHONE ENCOUNTER
From: Jl Bravo  To: Lendel Gaucher, NP  Sent: 6/2/2021 10:36 AM EDT  Subject: Prescription Question    There's only one prescription I didn't get a refill of and I was hoping you could put it in. Its the HYDROXYZINE BERNADETTE 25 MG CAP CAPSULE. Sorry just copy and pasted.

## 2021-06-12 DIAGNOSIS — J45.40 MODERATE PERSISTENT ASTHMA WITHOUT COMPLICATION: ICD-10-CM

## 2021-06-28 DIAGNOSIS — J45.40 MODERATE PERSISTENT ASTHMA WITHOUT COMPLICATION: ICD-10-CM

## 2021-06-30 RX ORDER — ALBUTEROL SULFATE 0.83 MG/ML
SOLUTION RESPIRATORY (INHALATION)
Qty: 75 ML | Refills: 0 | Status: SHIPPED | OUTPATIENT
Start: 2021-06-30 | End: 2021-08-25 | Stop reason: SDUPTHER

## 2021-07-01 RX ORDER — ALBUTEROL SULFATE 0.83 MG/ML
SOLUTION RESPIRATORY (INHALATION)
Qty: 75 ML | Refills: 0 | OUTPATIENT
Start: 2021-07-01

## 2021-07-26 ENCOUNTER — LAB ONLY (OUTPATIENT)
Dept: FAMILY MEDICINE CLINIC | Age: 31
End: 2021-07-26

## 2021-07-26 DIAGNOSIS — E11.9 CONTROLLED TYPE 2 DIABETES MELLITUS WITHOUT COMPLICATION, WITHOUT LONG-TERM CURRENT USE OF INSULIN (HCC): ICD-10-CM

## 2021-07-26 DIAGNOSIS — D72.829 LEUKOCYTOSIS, UNSPECIFIED TYPE: ICD-10-CM

## 2021-07-26 LAB
ALBUMIN SERPL-MCNC: 3.6 G/DL (ref 3.5–5)
ALBUMIN/GLOB SERPL: 0.9 {RATIO} (ref 1.1–2.2)
ALP SERPL-CCNC: 99 U/L (ref 45–117)
ALT SERPL-CCNC: 23 U/L (ref 12–78)
ANION GAP SERPL CALC-SCNC: 8 MMOL/L (ref 5–15)
AST SERPL-CCNC: 14 U/L (ref 15–37)
BASOPHILS # BLD: 0.1 K/UL (ref 0–0.1)
BASOPHILS NFR BLD: 0 % (ref 0–1)
BILIRUB SERPL-MCNC: 0.3 MG/DL (ref 0.2–1)
BUN SERPL-MCNC: 11 MG/DL (ref 6–20)
BUN/CREAT SERPL: 17 (ref 12–20)
CALCIUM SERPL-MCNC: 9.1 MG/DL (ref 8.5–10.1)
CHLORIDE SERPL-SCNC: 103 MMOL/L (ref 97–108)
CO2 SERPL-SCNC: 25 MMOL/L (ref 21–32)
CREAT SERPL-MCNC: 0.63 MG/DL (ref 0.55–1.02)
DIFFERENTIAL METHOD BLD: ABNORMAL
EOSINOPHIL # BLD: 0.1 K/UL (ref 0–0.4)
EOSINOPHIL NFR BLD: 1 % (ref 0–7)
ERYTHROCYTE [DISTWIDTH] IN BLOOD BY AUTOMATED COUNT: 14.9 % (ref 11.5–14.5)
EST. AVERAGE GLUCOSE BLD GHB EST-MCNC: 111 MG/DL
GLOBULIN SER CALC-MCNC: 3.8 G/DL (ref 2–4)
GLUCOSE SERPL-MCNC: 78 MG/DL (ref 65–100)
HBA1C MFR BLD: 5.5 % (ref 4–5.6)
HCT VFR BLD AUTO: 40.6 % (ref 35–47)
HGB BLD-MCNC: 13.5 G/DL (ref 11.5–16)
IMM GRANULOCYTES # BLD AUTO: 0 K/UL (ref 0–0.04)
IMM GRANULOCYTES NFR BLD AUTO: 0 % (ref 0–0.5)
LYMPHOCYTES # BLD: 2 K/UL (ref 0.8–3.5)
LYMPHOCYTES NFR BLD: 18 % (ref 12–49)
MCH RBC QN AUTO: 32.1 PG (ref 26–34)
MCHC RBC AUTO-ENTMCNC: 33.3 G/DL (ref 30–36.5)
MCV RBC AUTO: 96.4 FL (ref 80–99)
MONOCYTES # BLD: 0.9 K/UL (ref 0–1)
MONOCYTES NFR BLD: 8 % (ref 5–13)
NEUTS SEG # BLD: 8.2 K/UL (ref 1.8–8)
NEUTS SEG NFR BLD: 73 % (ref 32–75)
NRBC # BLD: 0 K/UL (ref 0–0.01)
NRBC BLD-RTO: 0 PER 100 WBC
PLATELET # BLD AUTO: 349 K/UL (ref 150–400)
PMV BLD AUTO: 9.9 FL (ref 8.9–12.9)
POTASSIUM SERPL-SCNC: 4.4 MMOL/L (ref 3.5–5.1)
PROT SERPL-MCNC: 7.4 G/DL (ref 6.4–8.2)
RBC # BLD AUTO: 4.21 M/UL (ref 3.8–5.2)
SODIUM SERPL-SCNC: 136 MMOL/L (ref 136–145)
WBC # BLD AUTO: 11.3 K/UL (ref 3.6–11)

## 2021-07-27 ENCOUNTER — TELEPHONE (OUTPATIENT)
Dept: FAMILY MEDICINE CLINIC | Age: 31
End: 2021-07-27

## 2021-07-27 DIAGNOSIS — D72.829 LEUKOCYTOSIS, UNSPECIFIED TYPE: Primary | ICD-10-CM

## 2021-07-27 NOTE — PROGRESS NOTES
Jennifer Adams,     Your WBC remains elevated. I recommend that you follow-up with a hematologist for further evaluation. Please call to schedule this appointment. See contact information below. The rest of your labs look great. A1c is at goal. Please let me know if you have any additional questions.     Tangela Gillespie NP    [facility] at [place] [address] [address],   [place], [address]  Phone: [phone]

## 2021-07-27 NOTE — TELEPHONE ENCOUNTER
NP Rosa Shahid,    [facility] stating chantix backordered/unavailable. They also stated it was \"recalled\". I contacted them to check and they stated some of the \"lots\" have been recalled. Previously a [facility] Ramos had it available. Do you remember which one and if appropriate to see if patient wants to transfer?       Thanks, Destiny Flor

## 2021-07-28 NOTE — TELEPHONE ENCOUNTER
It was [facility] in [place] that I've heard it is in stock. Can you call patient to discuss alternative pharmacy?

## 2021-07-28 NOTE — TELEPHONE ENCOUNTER
NP Ivonne Guillory patient. She advised that she stopped taking the chantix and does not want to take it. I advised that I would let you know. She stated that she told [facility] but they don't listen. Contacted [facility] to advise to delete the fax request.  They stated they will.     Thanks, Maryann Gonsales

## 2021-08-25 DIAGNOSIS — J45.40 MODERATE PERSISTENT ASTHMA WITHOUT COMPLICATION: ICD-10-CM

## 2021-08-25 NOTE — TELEPHONE ENCOUNTER
Last Visit: 6/1/21 NP Marco A Alicia  Next Appointment: 9/8/21 NP Rojas  Previous Refill Encounter(s): 6/30/21 75ml    Requested Prescriptions     Pending Prescriptions Disp Refills    albuterol (PROVENTIL VENTOLIN) 2.5 mg /3 mL (0.083 %) nebu 75 mL 0     Sig: USE 1 VIAL IN NEBULIZER EVERY 4 HOURS AS NEEDED FOR SHORTNESS OF BREATH OR COUGH.

## 2021-08-26 RX ORDER — ALBUTEROL SULFATE 0.83 MG/ML
SOLUTION RESPIRATORY (INHALATION)
Qty: 75 ML | Refills: 0 | Status: SHIPPED | OUTPATIENT
Start: 2021-08-26 | End: 2021-09-17 | Stop reason: SDUPTHER

## 2021-09-08 ENCOUNTER — OFFICE VISIT (OUTPATIENT)
Dept: FAMILY MEDICINE CLINIC | Age: 31
End: 2021-09-08
Payer: COMMERCIAL

## 2021-09-08 VITALS
TEMPERATURE: 97.9 F | OXYGEN SATURATION: 98 % | WEIGHT: 293 LBS | BODY MASS INDEX: 45.99 KG/M2 | RESPIRATION RATE: 18 BRPM | HEART RATE: 82 BPM | HEIGHT: 67 IN | SYSTOLIC BLOOD PRESSURE: 122 MMHG | DIASTOLIC BLOOD PRESSURE: 84 MMHG

## 2021-09-08 DIAGNOSIS — F31.60 BIPOLAR 1 DISORDER, MIXED (HCC): ICD-10-CM

## 2021-09-08 DIAGNOSIS — E11.9 CONTROLLED TYPE 2 DIABETES MELLITUS WITHOUT COMPLICATION, WITHOUT LONG-TERM CURRENT USE OF INSULIN (HCC): Primary | ICD-10-CM

## 2021-09-08 DIAGNOSIS — F41.9 ANXIETY AND DEPRESSION: ICD-10-CM

## 2021-09-08 DIAGNOSIS — F32.A ANXIETY AND DEPRESSION: ICD-10-CM

## 2021-09-08 LAB
CHOLEST SERPL-MCNC: 177 MG/DL
HDLC SERPL-MCNC: 38 MG/DL
HDLC SERPL: 4.7 {RATIO} (ref 0–5)
LDLC SERPL CALC-MCNC: 116.6 MG/DL (ref 0–100)
TRIGL SERPL-MCNC: 112 MG/DL (ref ?–150)
VLDLC SERPL CALC-MCNC: 22.4 MG/DL

## 2021-09-08 PROCEDURE — 99214 OFFICE O/P EST MOD 30 MIN: CPT | Performed by: NURSE PRACTITIONER

## 2021-09-08 RX ORDER — BUSPIRONE HYDROCHLORIDE 10 MG/1
10 TABLET ORAL 2 TIMES DAILY
Qty: 60 TABLET | Refills: 3 | Status: SHIPPED | OUTPATIENT
Start: 2021-09-08 | End: 2021-12-13

## 2021-09-08 NOTE — PROGRESS NOTES
Chief Complaint   Patient presents with    Diabetes     follow up     Anxiety    Hypertension     1. Have you been to the ER, urgent care clinic since your last visit? Hospitalized since your last visit? No    2. Have you seen or consulted any other health care providers outside of the [address] [place] [address] since your last visit? Include any pap smears or colon screening.  No

## 2021-09-08 NOTE — PATIENT INSTRUCTIONS
Neuropsychological Testing    Dr Anu Sims  [address] [facility], [address]   Phone: [phone]    Dr. Bia Fernandez, [address] [facility]  Phone: [phone]     [facility] (Adolescents and Adults)  Dr. July Fuller [address] [facility], [address]   [facility] [address] (Adolescents and Adults)  Dr. Brett Snow [address]  [phone]

## 2021-09-08 NOTE — PROGRESS NOTES
Assessment/Plan:     Diagnoses and all orders for this visit:    1. Controlled type 2 diabetes mellitus without complication, without long-term current use of insulin ([facility]  -     LIPID PANEL; Future  -     REFERRAL TO OPHTHALMOLOGY  Well-controlled on current therapy. Lipids pending. She is on statin therapy. She will establish with ophthalmology. 2. Anxiety and depression  -     busPIRone (BUSPAR) 10 mg tablet; Take 1 Tablet by mouth two (2) times a day. Uncontrolled however MDQ for bipolar screening is positive. Increase BuSpar as above. Refer to psychiatry for additional evaluation. 3. Bipolar 1 disorder, mixed ([facility]  -     REFERRAL TO PSYCHIATRY         Follow-up and Dispositions    · Return in about 6 months (around 3/8/2022) for Complete Physical.         Discussed expected course/resolution/complications of diagnosis in detail with patient. Medication risks/benefits/costs/interactions/alternatives discussed with patient. Pt was given after visit summary which includes diagnoses, current medications & vitals. Pt expressed understanding with the diagnosis and plan          Subjective:      Lew Montero is a 28 y.o. female who presents for had concerns including Diabetes (follow up ), Anxiety, and Hypertension. She reports the anxiety as poorly controlled. She reports the mood as worsening over time. Lives at home with grandparents. She works as a . She has an ob/gyn. She is followed by [facility]. Patient is a 28 y.o. female that comes today for follow up of Diabetes Mellitus Type 2. Patient does regularly monitor  their FSBG at home. The fasting plasma glucose (fpg) usually runs around 120 mg/L. not attempting to follow a low fat, low cholesterol diet  Patients activity level: sedentery  lost,  10 lbs. .  The patient has not experienced recent hypoglycemia. reports that she has been smoking cigarettes.  She started smoking about 16 years ago. She has been smoking about 0.50 packs per day. She has never used smokeless tobacco.    Lab Results   Component Value Date/Time    Hemoglobin A1c 5.5 07/26/2021 10:19 AM    Hemoglobin A1c 6.3 (H) 12/14/2020 11:08 AM    Hemoglobin A1c 7.0 (H) 09/08/2020 11:05 AM             Patient Active Problem List   Diagnosis Code    Anxiety F41.9    Depression F32.9    Obesity, morbid ([facility] E66.01    Sleep disorder G47.9    Hypertension associated with diabetes ([facility] E11.59, I15.2    Controlled type 2 diabetes mellitus without complication, without long-term current use of insulin ([facility]) E11.9       Current Outpatient Medications   Medication Sig Dispense Refill    busPIRone (BUSPAR) 10 mg tablet Take 1 Tablet by mouth two (2) times a day. 60 Tablet 3    albuterol (PROVENTIL VENTOLIN) 2.5 mg /3 mL (0.083 %) nebu USE 1 VIAL IN NEBULIZER EVERY 4 HOURS AS NEEDED FOR SHORTNESS OF BREATH OR COUGH. 75 mL 0    hydrOXYzine HCL (ATARAX) 25 mg tablet Take 1 Tablet by mouth daily as needed for Anxiety. 90 Tablet 0    amLODIPine (NORVASC) 10 mg tablet Take 1 Tablet by mouth daily. Indications: high blood pressure 90 Tablet 2    chlorthalidone (HYGROTEN) 50 mg tablet Take 1 Tablet by mouth daily. 90 Tablet 2    escitalopram oxalate (LEXAPRO) 20 mg tablet Take 1 Tablet by mouth daily. 90 Tablet 2    liraglutide (Victoza 3-Reyes) 0.6 mg/0.1 mL (18 mg/3 mL) pnij INJECT 1.2 MG SUBCUTANEOUSLY ONCE DAILY 9 Each 1    lisinopriL (PRINIVIL, ZESTRIL) 20 mg tablet Take 1 Tablet by mouth daily. 90 Tablet 2    metFORMIN (GLUCOPHAGE) 1,000 mg tablet Take 1 Tablet by mouth two (2) times daily (with meals). 180 Tablet 2    omeprazole (PRILOSEC) 40 mg capsule Take 1 Capsule by mouth Daily (before breakfast). 90 Capsule 2    potassium chloride SR (KLOR-CON 10) 10 mEq tablet Take 1 Tablet by mouth two (2) times a day. 180 Tablet 2    etonogestreL (Nexplanon) 68 mg impl by SubDERmal route.       Insulin Needles, Disposable, 31 gauge x 5/16\" ndle Use daily with victoza 1 Package 11    albuterol (PROVENTIL HFA, VENTOLIN HFA, PROAIR HFA) 90 mcg/actuation inhaler Take 1 Puff by inhalation every four (4) hours as needed for Wheezing. Shake well before each inhalation. (Patient taking differently: Take 1 Puff by inhalation every four (4) hours as needed for Wheezing. Shake well before each inhalation.) 2 Inhaler 2    multivitamin (ONE A DAY) tablet Take 1 Tab by mouth daily.  traZODone (DESYREL) 100 mg tablet Take 1 Tablet by mouth nightly. Indications: insomnia associated with depression 90 Tablet 2       Allergies   Allergen Reactions    Ibuprofen Nausea and Vomiting    Singulair [Montelukast] Other (comments)     Difficulty breathing       ROS:   Review of Systems   Constitutional: Negative for malaise/fatigue. Eyes: Negative for blurred vision. Respiratory: Negative for shortness of breath. Cardiovascular: Negative for chest pain. Psychiatric/Behavioral: The patient is nervous/anxious. Objective:     Visit Vitals  /84 (BP 1 Location: Left arm, BP Patient Position: Sitting, BP Cuff Size: Large adult long)   Pulse 82   Temp 97.9 °F (36.6 °C) (Temporal)   Resp 18   Ht 5' 7\" (1.702 m)   Wt (!) 415 lb (188.2 kg)   SpO2 98%   BMI 65.00 kg/m²       Vitals and Nurse Documentation reviewed. Physical Exam  Constitutional:       General: She is not in acute distress. Appearance: She is obese. Cardiovascular:      Heart sounds: S1 normal and S2 normal. No murmur heard. No friction rub. No gallop. Pulmonary:      Effort: No respiratory distress. Breath sounds: Normal breath sounds. Skin:     General: Skin is warm and dry.    Psychiatric:         Mood and Affect: Mood and affect normal.

## 2021-09-09 ENCOUNTER — PATIENT MESSAGE (OUTPATIENT)
Dept: FAMILY MEDICINE CLINIC | Age: 31
End: 2021-09-09

## 2021-09-10 ENCOUNTER — OFFICE VISIT (OUTPATIENT)
Dept: ONCOLOGY | Age: 31
End: 2021-09-10
Payer: COMMERCIAL

## 2021-09-10 VITALS
SYSTOLIC BLOOD PRESSURE: 135 MMHG | RESPIRATION RATE: 20 BRPM | DIASTOLIC BLOOD PRESSURE: 60 MMHG | WEIGHT: 293 LBS | BODY MASS INDEX: 65.47 KG/M2 | HEART RATE: 89 BPM | TEMPERATURE: 98.7 F | OXYGEN SATURATION: 95 %

## 2021-09-10 DIAGNOSIS — E11.59 HYPERTENSION ASSOCIATED WITH DIABETES (HCC): ICD-10-CM

## 2021-09-10 DIAGNOSIS — E66.01 OBESITY, MORBID (HCC): ICD-10-CM

## 2021-09-10 DIAGNOSIS — E11.9 CONTROLLED TYPE 2 DIABETES MELLITUS WITHOUT COMPLICATION, WITHOUT LONG-TERM CURRENT USE OF INSULIN (HCC): ICD-10-CM

## 2021-09-10 DIAGNOSIS — I15.2 HYPERTENSION ASSOCIATED WITH DIABETES (HCC): ICD-10-CM

## 2021-09-10 DIAGNOSIS — D72.825 BANDEMIA: Primary | ICD-10-CM

## 2021-09-10 PROCEDURE — 99203 OFFICE O/P NEW LOW 30 MIN: CPT | Performed by: INTERNAL MEDICINE

## 2021-09-10 NOTE — PROGRESS NOTES
Luke Veronica is a 28 y.o. female    Chief Complaint   Patient presents with    New Patient     Leukocytosis       1. Have you been to the ER, urgent care clinic since your last visit? Hospitalized since your last visit? No    2. Have you seen or consulted any other health care providers outside of the [address] [place] [address] since your last visit? Include any pap smears or colon screening.  No

## 2021-09-10 NOTE — PROGRESS NOTES
[facility] [place] [address] [address], [address], [place]: [phone]  F: [phone]    Reason for Visit:   Richar Decker is a 28 y.o. female who is seen in consultation at the request of Singh Newton NP for evaluation of Leucocytosis    History of Present Illness:   Patient is a 28 y.o. female seen for leucocytosis. Has had mild stable neutrophilic leucocytosis for years and atleast since 2012 with no there cbc abnormalities. She has no fevers , chills,sweats, weight changes, spleen is intact. She has no night sweats. She has been doing well overall. She has no new cough or SOB . She has HPV. No blood clots. Smoked off and on for 13-14 years. She states that her great grandmother had ovarian cancer.      Past Medical History:   Diagnosis Date    Anxiety and depression     anxiety    Asthma     Atrial fibrillation (HCC)     Chondromalacia of patella, left     Enlarged heart     Heart murmur     Hypertension     Knee pain, bilateral     Mixed hyperlipidemia     Morbid obesity (HCC)     PCOS (polycystic ovarian syndrome)     Pre-diabetes     Sleep apnea       Past Surgical History:   Procedure Laterality Date    HX ORTHOPAEDIC Bilateral     as child knees    IA CARDIAC SURG PROCEDURE UNLIST  age 4    2 heart valves \"sewn up\" as an infant      Social History     Tobacco Use    Smoking status: Current Some Day Smoker     Packs/day: 0.50     Types: Cigarettes     Start date: 2005    Smokeless tobacco: Never Used    Tobacco comment: previous 1.5 pack per day until quit in 2018 then restarted   Substance Use Topics    Alcohol use: No     Comment: none      Family History   Problem Relation Age of Onset    Mental Retardation Mother     Psychiatric Disorder Mother    Felicita Gloss Bladder Disease Mother     Diabetes Maternal Grandmother     Heart Disease Maternal Grandmother     Hypertension Maternal Grandmother     Stroke Maternal Grandmother    [place] Bladder Disease Maternal Grandmother     Gall Bladder Disease Sister      Current Outpatient Medications   Medication Sig    busPIRone (BUSPAR) 10 mg tablet Take 1 Tablet by mouth two (2) times a day.  albuterol (PROVENTIL VENTOLIN) 2.5 mg /3 mL (0.083 %) nebu USE 1 VIAL IN NEBULIZER EVERY 4 HOURS AS NEEDED FOR SHORTNESS OF BREATH OR COUGH.  hydrOXYzine HCL (ATARAX) 25 mg tablet Take 1 Tablet by mouth daily as needed for Anxiety.  amLODIPine (NORVASC) 10 mg tablet Take 1 Tablet by mouth daily. Indications: high blood pressure    chlorthalidone (HYGROTEN) 50 mg tablet Take 1 Tablet by mouth daily.  escitalopram oxalate (LEXAPRO) 20 mg tablet Take 1 Tablet by mouth daily.  liraglutide (Victoza 3-Reyes) 0.6 mg/0.1 mL (18 mg/3 mL) pnij INJECT 1.2 MG SUBCUTANEOUSLY ONCE DAILY    lisinopriL (PRINIVIL, ZESTRIL) 20 mg tablet Take 1 Tablet by mouth daily.  metFORMIN (GLUCOPHAGE) 1,000 mg tablet Take 1 Tablet by mouth two (2) times daily (with meals).  omeprazole (PRILOSEC) 40 mg capsule Take 1 Capsule by mouth Daily (before breakfast).  traZODone (DESYREL) 100 mg tablet Take 1 Tablet by mouth nightly. Indications: insomnia associated with depression    potassium chloride SR (KLOR-CON 10) 10 mEq tablet Take 1 Tablet by mouth two (2) times a day.  etonogestreL (Nexplanon) 68 mg impl by SubDERmal route.  Insulin Needles, Disposable, 31 gauge x 5/16\" ndle Use daily with victoza    albuterol (PROVENTIL HFA, VENTOLIN HFA, PROAIR HFA) 90 mcg/actuation inhaler Take 1 Puff by inhalation every four (4) hours as needed for Wheezing. Shake well before each inhalation. (Patient taking differently: Take 1 Puff by inhalation every four (4) hours as needed for Wheezing. Shake well before each inhalation.)    multivitamin (ONE A DAY) tablet Take 1 Tab by mouth daily. No current facility-administered medications for this visit.       Allergies   Allergen Reactions    Ibuprofen Nausea and Vomiting    Singulair [Montelukast] Other (comments)     Difficulty breathing        Review of Systems: A complete review of systems was obtained, negative except as described above. Physical Exam:     Visit Vitals  /60 (BP 1 Location: Left lower arm, BP Patient Position: Sitting)   Pulse 89   Temp 98.7 °F (37.1 °C)   Resp 20   Wt (!) 418 lb (189.6 kg)   SpO2 95%   BMI 65.47 kg/m²     ECOG PS: 1  General: No distress  Eyes: PERRLA, anicteric sclerae  HENT: Atraumatic, OP clear  Neck: Supple  Lymphatic: No cervical, supraclavicular, or inguinal adenopathy  Respiratory:normal respiratory effort  CV: Normal rate, bilateral LE non pitting edema  MS: Normal gait and station. Digits without clubbing or cyanosis. Skin: No rashes, ecchymoses, or petechiae. Normal temperature, turgor, and texture. Psych: Alert, oriented, appropriate affect, normal judgment/insight    Results:     Lab Results   Component Value Date/Time    WBC 11.3 (H) 07/26/2021 10:19 AM    HGB 13.5 07/26/2021 10:19 AM    HCT 40.6 07/26/2021 10:19 AM    PLATELET 729 49/72/0359 10:19 AM    MCV 96.4 07/26/2021 10:19 AM    ABS. NEUTROPHILS 8.2 (H) 07/26/2021 10:19 AM     Lab Results   Component Value Date/Time    Sodium 136 07/26/2021 10:19 AM    Potassium 4.4 07/26/2021 10:19 AM    Chloride 103 07/26/2021 10:19 AM    CO2 25 07/26/2021 10:19 AM    Glucose 78 07/26/2021 10:19 AM    BUN 11 07/26/2021 10:19 AM    Creatinine 0.63 07/26/2021 10:19 AM    GFR est AA >60 07/26/2021 10:19 AM    GFR est non-AA >60 07/26/2021 10:19 AM    Calcium 9.1 07/26/2021 10:19 AM     Lab Results   Component Value Date/Time    Bilirubin, total 0.3 07/26/2021 10:19 AM    ALT (SGPT) 23 07/26/2021 10:19 AM    Alk.  phosphatase 99 07/26/2021 10:19 AM    Protein, total 7.4 07/26/2021 10:19 AM    Albumin 3.6 07/26/2021 10:19 AM    Globulin 3.8 07/26/2021 10:19 AM       Lab Results   Component Value Date/Time    TSH 1.900 08/23/2019 11:03 AM    TSH 1.780 01/24/2012 10:40 AM    Lipase 62 (L) 08/14/2019 08:00 PM       Lab Results   Component Value Date/Time    D-dimer 0.43 08/14/2019 08:00 PM       Records reviewed and summarized above. Pathology report(s) reviewed above. Radiology report(s) reviewed above. Assessment:   1)Neutrophilia    Stable since atleast 2012  No other cbc abnormalities  No B symptoms  Reactive to smoking, obesity or normal for her    Will monitor    2) PCOS    3) Obesity    4) Prediabetes    Plan:     · RTC 4 months with cbc diff and smear    I appreciate the opportunity to participate in Ms. Santos Royals care.     Signed By: Rika Kelly MD

## 2021-09-16 RX ORDER — ATORVASTATIN CALCIUM 20 MG/1
20 TABLET, FILM COATED ORAL DAILY
Qty: 30 TABLET | Refills: 3 | Status: SHIPPED | OUTPATIENT
Start: 2021-09-16

## 2021-09-16 NOTE — TELEPHONE ENCOUNTER
From: Barbara Marques  To: Carolyn Webber NP  Sent: 9/9/2021 2:35 PM EDT  Subject: Test Results Question    I have never been on anything for it.

## 2021-09-17 DIAGNOSIS — J45.40 MODERATE PERSISTENT ASTHMA WITHOUT COMPLICATION: ICD-10-CM

## 2021-09-23 RX ORDER — ALBUTEROL SULFATE 0.83 MG/ML
SOLUTION RESPIRATORY (INHALATION)
Qty: 75 ML | Refills: 0 | Status: SHIPPED | OUTPATIENT
Start: 2021-09-23

## 2021-10-07 DIAGNOSIS — F41.9 ANXIETY AND DEPRESSION: ICD-10-CM

## 2021-10-07 DIAGNOSIS — F32.A ANXIETY AND DEPRESSION: ICD-10-CM

## 2021-10-07 RX ORDER — BUSPIRONE HYDROCHLORIDE 10 MG/1
10 TABLET ORAL 2 TIMES DAILY
Qty: 60 TABLET | Refills: 3 | Status: CANCELLED | OUTPATIENT
Start: 2021-10-07

## 2021-10-11 NOTE — TELEPHONE ENCOUNTER
----- Message from [facility] MELENDREZ sent at 10/8/2021  9:38 AM EDT -----  Regarding: STEPHANIE Xie/ telephone  Contact: [phone]  General Message/Vendor Calls    Caller's first and last name: n/a       Reason for call: Patient has one pill left is prescribed Buspirone hcl 10mg and is out of her refills and has questions in reference to getting medications.        Callback required yes/no and why: yes, discuss         Best contact number(s): [phone]      Details to clarify the request: [address] [place] [address], [address]

## 2021-10-14 NOTE — TELEPHONE ENCOUNTER
Requested Prescriptions     Pending Prescriptions Disp Refills    busPIRone (BUSPAR) 10 mg tablet 60 Tablet 3     Sig: Take 1 Tablet by mouth two (2) times a day.

## 2021-12-08 DIAGNOSIS — E66.01 MORBID OBESITY (HCC): ICD-10-CM

## 2021-12-08 DIAGNOSIS — E11.9 CONTROLLED TYPE 2 DIABETES MELLITUS WITHOUT COMPLICATION, WITHOUT LONG-TERM CURRENT USE OF INSULIN (HCC): ICD-10-CM

## 2021-12-08 NOTE — TELEPHONE ENCOUNTER
Last Visit: 9/8/21 SIMA Xie  Next Appointment: 3/9/22 SIMA Xie  Previous Refill Encounter(s): 11/13/20 1 pkg + 11  (pharmacy shows 100 + 6)    Requested Prescriptions     Pending Prescriptions Disp Refills    Insulin Needles, Disposable, 31 gauge x 5/16\" ndle 100 Pen Needle 6     Sig: Use daily with victoza

## 2021-12-09 RX ORDER — PEN NEEDLE, DIABETIC 30 GX3/16"
NEEDLE, DISPOSABLE MISCELLANEOUS
Qty: 100 PEN NEEDLE | Refills: 6 | Status: SHIPPED | OUTPATIENT
Start: 2021-12-09

## 2021-12-30 ENCOUNTER — TELEPHONE (OUTPATIENT)
Dept: ONCOLOGY | Age: 31
End: 2021-12-30

## 2022-01-11 DIAGNOSIS — F41.9 ANXIETY: ICD-10-CM

## 2022-01-11 DIAGNOSIS — F32.A DEPRESSION, UNSPECIFIED DEPRESSION TYPE: ICD-10-CM

## 2022-01-11 DIAGNOSIS — G47.9 SLEEP DISORDER: ICD-10-CM

## 2022-01-11 DIAGNOSIS — F32.A ANXIETY AND DEPRESSION: ICD-10-CM

## 2022-01-11 DIAGNOSIS — E87.6 HYPOKALEMIA: ICD-10-CM

## 2022-01-11 DIAGNOSIS — F41.9 ANXIETY AND DEPRESSION: ICD-10-CM

## 2022-01-11 DIAGNOSIS — I10 ESSENTIAL HYPERTENSION: ICD-10-CM

## 2022-01-11 NOTE — TELEPHONE ENCOUNTER
Last Visit: 9/8/21 SIMA Xie  Next Appointment: 3/9/22 SIMA Xie  Previous Refill Encounter(s): 6/1/21 90 + 2- (all) (transferred to [facility]- last refilled 1/5/22 for 30 d/s- spoke to [facility]- needs new rx's)    Requested Prescriptions     Pending Prescriptions Disp Refills    amLODIPine (NORVASC) 10 mg tablet 90 Tablet 1     Sig: Take 1 Tablet by mouth daily. Indications: high blood pressure    chlorthalidone (HYGROTEN) 50 mg tablet 90 Tablet 1     Sig: Take 1 Tablet by mouth daily.  lisinopriL (PRINIVIL, ZESTRIL) 20 mg tablet 90 Tablet 1     Sig: Take 1 Tablet by mouth daily.  potassium chloride SR (KLOR-CON 10) 10 mEq tablet 180 Tablet 1     Sig: Take 1 Tablet by mouth two (2) times a day.  traZODone (DESYREL) 100 mg tablet 90 Tablet 1     Sig: Take 1 Tablet by mouth nightly.  Indications: insomnia associated with depression

## 2022-01-13 DIAGNOSIS — J45.40 MODERATE PERSISTENT ASTHMA WITHOUT COMPLICATION: ICD-10-CM

## 2022-01-13 RX ORDER — AMLODIPINE BESYLATE 10 MG/1
10 TABLET ORAL DAILY
Qty: 90 TABLET | Refills: 1 | Status: SHIPPED | OUTPATIENT
Start: 2022-01-13 | End: 2022-08-04

## 2022-01-13 RX ORDER — LISINOPRIL 20 MG/1
20 TABLET ORAL DAILY
Qty: 90 TABLET | Refills: 1 | Status: SHIPPED | OUTPATIENT
Start: 2022-01-13 | End: 2022-08-04

## 2022-01-13 RX ORDER — TRAZODONE HYDROCHLORIDE 100 MG/1
100 TABLET ORAL
Qty: 90 TABLET | Refills: 1 | Status: SHIPPED | OUTPATIENT
Start: 2022-01-13 | End: 2022-08-04

## 2022-01-13 RX ORDER — POTASSIUM CHLORIDE 750 MG/1
10 TABLET, FILM COATED, EXTENDED RELEASE ORAL 2 TIMES DAILY
Qty: 180 TABLET | Refills: 1 | Status: SHIPPED | OUTPATIENT
Start: 2022-01-13 | End: 2022-08-04

## 2022-01-13 RX ORDER — CHLORTHALIDONE 50 MG/1
50 TABLET ORAL DAILY
Qty: 90 TABLET | Refills: 1 | Status: SHIPPED | OUTPATIENT
Start: 2022-01-13 | End: 2022-08-04

## 2022-01-13 NOTE — TELEPHONE ENCOUNTER
Last visit 09/08/2021 SIMA Xie   Next appointment 03/09/2022 SIMA Xie   Previous refill encounter(s)   09/11/2020 Albuterol HFA INH #2 with 2 refills     Requested Prescriptions     Pending Prescriptions Disp Refills    albuterol (PROVENTIL HFA, VENTOLIN HFA, PROAIR HFA) 90 mcg/actuation inhaler 2 Each 2     Sig: Take 1 Puff by inhalation every four (4) hours as needed for Wheezing. Shake well before each inhalation.

## 2022-01-17 RX ORDER — ALBUTEROL SULFATE 90 UG/1
1 AEROSOL, METERED RESPIRATORY (INHALATION)
Qty: 2 EACH | Refills: 2 | Status: SHIPPED | OUTPATIENT
Start: 2022-01-17

## 2022-02-09 DIAGNOSIS — E11.9 CONTROLLED TYPE 2 DIABETES MELLITUS WITHOUT COMPLICATION, WITHOUT LONG-TERM CURRENT USE OF INSULIN (HCC): ICD-10-CM

## 2022-02-09 DIAGNOSIS — E66.01 MORBID OBESITY (HCC): ICD-10-CM

## 2022-02-09 NOTE — TELEPHONE ENCOUNTER
Last Visit: 9/8/21 SIMA Xie  Next Appointment: 3/9/22 SIMA Xie  Previous Refill Encounter(s): 6/1/21 9 + 1    Requested Prescriptions     Pending Prescriptions Disp Refills    liraglutide (Victoza 3-Reyes) 0.6 mg/0.1 mL (18 mg/3 mL) pnij 9 Each 1     Sig: INJECT 1.2 MG SUBCUTANEOUSLY ONCE DAILY

## 2022-02-10 RX ORDER — LIRAGLUTIDE 6 MG/ML
INJECTION SUBCUTANEOUS
Qty: 9 EACH | Refills: 1 | Status: SHIPPED | OUTPATIENT
Start: 2022-02-10

## 2022-03-14 DIAGNOSIS — K21.9 GASTROESOPHAGEAL REFLUX DISEASE, UNSPECIFIED WHETHER ESOPHAGITIS PRESENT: ICD-10-CM

## 2022-03-14 RX ORDER — OMEPRAZOLE 40 MG/1
40 CAPSULE, DELAYED RELEASE ORAL
Qty: 90 CAPSULE | Refills: 1 | Status: SHIPPED | OUTPATIENT
Start: 2022-03-14

## 2022-03-14 NOTE — TELEPHONE ENCOUNTER
Last visit 09/08/2021 SIMA Xie   Next appointment 03/09/2022 SIMA Xie  Previous refill encounter(s)   06/01/2021 Prilosec #90 with 2 refills. Requested Prescriptions     Pending Prescriptions Disp Refills    omeprazole (PRILOSEC) 40 mg capsule 90 Capsule 0     Sig: Take 1 Capsule by mouth Daily (before breakfast).

## 2022-03-18 PROBLEM — E11.59 HYPERTENSION ASSOCIATED WITH DIABETES (HCC): Status: ACTIVE | Noted: 2020-12-14

## 2022-03-18 PROBLEM — E11.9 CONTROLLED TYPE 2 DIABETES MELLITUS WITHOUT COMPLICATION, WITHOUT LONG-TERM CURRENT USE OF INSULIN (HCC): Status: ACTIVE | Noted: 2020-12-14

## 2022-03-18 PROBLEM — I15.2 HYPERTENSION ASSOCIATED WITH DIABETES (HCC): Status: ACTIVE | Noted: 2020-12-14

## 2022-03-19 PROBLEM — E66.01 OBESITY, MORBID (HCC): Status: ACTIVE | Noted: 2019-07-29

## 2022-03-19 PROBLEM — G47.9 SLEEP DISORDER: Status: ACTIVE | Noted: 2020-02-11

## 2022-03-20 PROBLEM — D72.825 BANDEMIA: Status: ACTIVE | Noted: 2021-09-10

## 2022-04-11 DIAGNOSIS — F32.A ANXIETY AND DEPRESSION: ICD-10-CM

## 2022-04-11 DIAGNOSIS — E11.9 CONTROLLED TYPE 2 DIABETES MELLITUS WITHOUT COMPLICATION, WITHOUT LONG-TERM CURRENT USE OF INSULIN (HCC): ICD-10-CM

## 2022-04-11 DIAGNOSIS — F41.9 ANXIETY AND DEPRESSION: ICD-10-CM

## 2022-04-11 RX ORDER — ESCITALOPRAM OXALATE 20 MG/1
20 TABLET ORAL DAILY
Qty: 90 TABLET | Refills: 0 | Status: SHIPPED | OUTPATIENT
Start: 2022-04-11 | End: 2022-08-04

## 2022-04-11 RX ORDER — METFORMIN HYDROCHLORIDE 1000 MG/1
1000 TABLET ORAL 2 TIMES DAILY WITH MEALS
Qty: 180 TABLET | Refills: 0 | Status: SHIPPED | OUTPATIENT
Start: 2022-04-11 | End: 2022-08-04

## 2022-04-11 NOTE — TELEPHONE ENCOUNTER
Last Visit: 9/8/21 NP Rojas  Next Appointment: no show 3/9/22   Previous Refill Encounter(s):   Escitalopram 6/1/21 90 + 2  Metformin 6/1/21 180 + 2    Requested Prescriptions     Pending Prescriptions Disp Refills    escitalopram oxalate (LEXAPRO) 20 mg tablet 90 Tablet 0     Sig: Take 1 Tablet by mouth daily.  metFORMIN (GLUCOPHAGE) 1,000 mg tablet 180 Tablet 0     Sig: Take 1 Tablet by mouth two (2) times daily (with meals).

## 2022-06-10 DIAGNOSIS — F32.A ANXIETY AND DEPRESSION: ICD-10-CM

## 2022-06-10 DIAGNOSIS — F41.9 ANXIETY AND DEPRESSION: ICD-10-CM

## 2022-06-13 RX ORDER — BUSPIRONE HYDROCHLORIDE 10 MG/1
TABLET ORAL
Qty: 60 TABLET | Refills: 5 | Status: SHIPPED | OUTPATIENT
Start: 2022-06-13

## 2022-08-03 DIAGNOSIS — E11.9 CONTROLLED TYPE 2 DIABETES MELLITUS WITHOUT COMPLICATION, WITHOUT LONG-TERM CURRENT USE OF INSULIN (HCC): ICD-10-CM

## 2022-08-03 DIAGNOSIS — F32.A ANXIETY AND DEPRESSION: ICD-10-CM

## 2022-08-03 DIAGNOSIS — G47.9 SLEEP DISORDER: ICD-10-CM

## 2022-08-03 DIAGNOSIS — E87.6 HYPOKALEMIA: ICD-10-CM

## 2022-08-03 DIAGNOSIS — F41.9 ANXIETY AND DEPRESSION: ICD-10-CM

## 2022-08-03 DIAGNOSIS — I10 ESSENTIAL HYPERTENSION: ICD-10-CM

## 2022-08-03 DIAGNOSIS — F41.9 ANXIETY: ICD-10-CM

## 2022-08-03 DIAGNOSIS — F32.A DEPRESSION, UNSPECIFIED DEPRESSION TYPE: ICD-10-CM

## 2022-08-04 RX ORDER — POTASSIUM CHLORIDE 750 MG/1
TABLET, FILM COATED, EXTENDED RELEASE ORAL
Qty: 60 TABLET | Refills: 5 | Status: SHIPPED | OUTPATIENT
Start: 2022-08-04

## 2022-08-04 RX ORDER — CHLORTHALIDONE 50 MG/1
TABLET ORAL
Qty: 30 TABLET | Refills: 5 | Status: SHIPPED | OUTPATIENT
Start: 2022-08-04

## 2022-08-04 RX ORDER — LISINOPRIL 20 MG/1
TABLET ORAL
Qty: 30 TABLET | Refills: 5 | Status: SHIPPED | OUTPATIENT
Start: 2022-08-04

## 2022-08-04 RX ORDER — AMLODIPINE BESYLATE 10 MG/1
TABLET ORAL
Qty: 30 TABLET | Refills: 5 | Status: SHIPPED | OUTPATIENT
Start: 2022-08-04

## 2022-08-04 RX ORDER — METFORMIN HYDROCHLORIDE 1000 MG/1
TABLET ORAL
Qty: 60 TABLET | Refills: 2 | Status: SHIPPED | OUTPATIENT
Start: 2022-08-04 | End: 2022-11-03

## 2022-08-04 RX ORDER — ESCITALOPRAM OXALATE 20 MG/1
TABLET ORAL
Qty: 30 TABLET | Refills: 2 | Status: SHIPPED | OUTPATIENT
Start: 2022-08-04 | End: 2022-11-03

## 2022-08-04 RX ORDER — TRAZODONE HYDROCHLORIDE 100 MG/1
TABLET ORAL
Qty: 30 TABLET | Refills: 5 | Status: SHIPPED | OUTPATIENT
Start: 2022-08-04

## 2022-09-28 DIAGNOSIS — E11.9 CONTROLLED TYPE 2 DIABETES MELLITUS WITHOUT COMPLICATION, WITHOUT LONG-TERM CURRENT USE OF INSULIN (HCC): ICD-10-CM

## 2022-09-29 RX ORDER — METFORMIN HYDROCHLORIDE 1000 MG/1
TABLET ORAL
Qty: 60 TABLET | Refills: 1 | OUTPATIENT
Start: 2022-09-29

## 2022-09-29 NOTE — TELEPHONE ENCOUNTER
Attempted to contact pt several time's on 9/29/22 to let her know that her medication was refused,and that she need's to make a follow-up with SIMA Xie. If pt calls back please make pt an appt with SIMA Xie.

## 2022-10-13 DIAGNOSIS — E11.9 CONTROLLED TYPE 2 DIABETES MELLITUS WITHOUT COMPLICATION, WITHOUT LONG-TERM CURRENT USE OF INSULIN (HCC): ICD-10-CM

## 2022-10-13 DIAGNOSIS — F41.9 ANXIETY AND DEPRESSION: ICD-10-CM

## 2022-10-13 DIAGNOSIS — F32.A ANXIETY AND DEPRESSION: ICD-10-CM

## 2022-10-13 RX ORDER — METFORMIN HYDROCHLORIDE 1000 MG/1
TABLET ORAL
Qty: 60 TABLET | Refills: 2 | OUTPATIENT
Start: 2022-10-13

## 2022-10-13 RX ORDER — ESCITALOPRAM OXALATE 20 MG/1
TABLET ORAL
Qty: 30 TABLET | Refills: 2 | OUTPATIENT
Start: 2022-10-13

## 2022-11-12 DIAGNOSIS — F32.A ANXIETY AND DEPRESSION: ICD-10-CM

## 2022-11-12 DIAGNOSIS — F41.9 ANXIETY AND DEPRESSION: ICD-10-CM

## 2022-11-12 DIAGNOSIS — E11.9 CONTROLLED TYPE 2 DIABETES MELLITUS WITHOUT COMPLICATION, WITHOUT LONG-TERM CURRENT USE OF INSULIN (HCC): ICD-10-CM

## 2022-11-14 RX ORDER — ESCITALOPRAM OXALATE 20 MG/1
TABLET ORAL
Qty: 30 TABLET | Refills: 0 | OUTPATIENT
Start: 2022-11-14

## 2022-11-14 RX ORDER — METFORMIN HYDROCHLORIDE 1000 MG/1
TABLET ORAL
Qty: 60 TABLET | Refills: 0 | OUTPATIENT
Start: 2022-11-14

## 2022-11-14 NOTE — TELEPHONE ENCOUNTER
Attempted to LVM on 11/14/22 to let pt know that her medication's had been refused. However the only number that we have on file come's up as Subscriber you have dialed is not in Service at this time. If pt calls back please schedule follow-up appt as well as get a good contact number.   -TINO  11/14/22

## 2023-01-07 DIAGNOSIS — F41.9 ANXIETY AND DEPRESSION: ICD-10-CM

## 2023-01-07 DIAGNOSIS — F32.A ANXIETY AND DEPRESSION: ICD-10-CM

## 2023-01-09 RX ORDER — BUSPIRONE HYDROCHLORIDE 10 MG/1
TABLET ORAL
Qty: 60 TABLET | Refills: 5 | OUTPATIENT
Start: 2023-01-09

## 2023-05-22 RX ORDER — ETONOGESTREL 68 MG/1
IMPLANT SUBCUTANEOUS
COMMUNITY

## 2023-05-22 RX ORDER — BUSPIRONE HYDROCHLORIDE 10 MG/1
1 TABLET ORAL 2 TIMES DAILY
COMMUNITY
Start: 2022-06-13

## 2023-05-22 RX ORDER — ALBUTEROL SULFATE 2.5 MG/3ML
SOLUTION RESPIRATORY (INHALATION)
COMMUNITY
Start: 2021-09-23

## 2023-05-22 RX ORDER — CHLORTHALIDONE 50 MG/1
1 TABLET ORAL DAILY
COMMUNITY
Start: 2022-08-04

## 2023-05-22 RX ORDER — TRAZODONE HYDROCHLORIDE 100 MG/1
TABLET ORAL
COMMUNITY
Start: 2022-08-04

## 2023-05-22 RX ORDER — ATORVASTATIN CALCIUM 20 MG/1
20 TABLET, FILM COATED ORAL DAILY
COMMUNITY
Start: 2021-09-16

## 2023-05-22 RX ORDER — LIRAGLUTIDE 6 MG/ML
INJECTION SUBCUTANEOUS
COMMUNITY
Start: 2022-02-10

## 2023-05-22 RX ORDER — OMEPRAZOLE 40 MG/1
40 CAPSULE, DELAYED RELEASE ORAL
COMMUNITY
Start: 2022-03-14

## 2023-05-22 RX ORDER — LISINOPRIL 20 MG/1
1 TABLET ORAL DAILY
COMMUNITY
Start: 2022-08-04

## 2023-05-22 RX ORDER — HYDROXYZINE HYDROCHLORIDE 25 MG/1
25 TABLET, FILM COATED ORAL DAILY PRN
COMMUNITY
Start: 2021-06-02

## 2023-05-22 RX ORDER — AMLODIPINE BESYLATE 10 MG/1
TABLET ORAL
COMMUNITY
Start: 2022-08-04

## 2023-05-22 RX ORDER — ALBUTEROL SULFATE 90 UG/1
1 AEROSOL, METERED RESPIRATORY (INHALATION) EVERY 4 HOURS PRN
COMMUNITY
Start: 2022-01-17

## 2023-05-22 RX ORDER — POTASSIUM CHLORIDE 750 MG/1
1 TABLET, FILM COATED, EXTENDED RELEASE ORAL 2 TIMES DAILY
COMMUNITY
Start: 2022-08-04

## 2023-05-22 RX ORDER — ESCITALOPRAM OXALATE 20 MG/1
1 TABLET ORAL DAILY
COMMUNITY
Start: 2022-11-03